# Patient Record
Sex: FEMALE | Race: BLACK OR AFRICAN AMERICAN | NOT HISPANIC OR LATINO | Employment: OTHER | URBAN - METROPOLITAN AREA
[De-identification: names, ages, dates, MRNs, and addresses within clinical notes are randomized per-mention and may not be internally consistent; named-entity substitution may affect disease eponyms.]

---

## 2017-11-28 PROBLEM — R22.31 MASS OF RIGHT AXILLA: Status: ACTIVE | Noted: 2017-11-28

## 2017-11-28 PROBLEM — L40.50 PSORIATIC ARTHRITIS: Status: ACTIVE | Noted: 2017-11-28

## 2017-11-28 PROBLEM — Z12.39 BREAST SCREENING: Status: ACTIVE | Noted: 2017-11-28

## 2017-11-28 PROBLEM — F41.9 ANXIETY: Status: ACTIVE | Noted: 2017-11-28

## 2017-11-28 PROBLEM — M25.521 ELBOW PAIN, RIGHT: Status: ACTIVE | Noted: 2017-11-28

## 2017-11-28 PROBLEM — I10 HYPERTENSION: Status: ACTIVE | Noted: 2017-11-28

## 2018-01-25 PROBLEM — H66.90 ACUTE OTITIS MEDIA: Status: ACTIVE | Noted: 2018-01-25

## 2018-01-25 PROBLEM — R11.0 NAUSEA: Status: ACTIVE | Noted: 2018-01-25

## 2018-01-25 PROBLEM — J40 BRONCHITIS: Status: ACTIVE | Noted: 2018-01-25

## 2018-01-25 PROBLEM — J11.1 INFLUENZA: Status: ACTIVE | Noted: 2018-01-25

## 2018-10-24 PROBLEM — N28.1 RENAL CYST, LEFT: Status: ACTIVE | Noted: 2018-10-24

## 2018-10-24 PROBLEM — R74.8 ABNORMAL LIVER ENZYMES: Status: ACTIVE | Noted: 2018-10-24

## 2019-03-26 ENCOUNTER — OFFICE VISIT (OUTPATIENT)
Dept: SURGERY | Facility: CLINIC | Age: 59
End: 2019-03-26
Payer: COMMERCIAL

## 2019-03-26 VITALS
HEIGHT: 63 IN | BODY MASS INDEX: 33.31 KG/M2 | WEIGHT: 188 LBS | TEMPERATURE: 98 F | SYSTOLIC BLOOD PRESSURE: 133 MMHG | DIASTOLIC BLOOD PRESSURE: 80 MMHG

## 2019-03-26 DIAGNOSIS — K64.5 THROMBOSED EXTERNAL HEMORRHOID: Primary | ICD-10-CM

## 2019-03-26 DIAGNOSIS — K81.1 CHRONIC CHOLECYSTITIS: ICD-10-CM

## 2019-03-26 PROCEDURE — 3079F PR MOST RECENT DIASTOLIC BLOOD PRESSURE 80-89 MM HG: ICD-10-PCS | Mod: ,,, | Performed by: SURGERY

## 2019-03-26 PROCEDURE — 3075F SYST BP GE 130 - 139MM HG: CPT | Mod: ,,, | Performed by: SURGERY

## 2019-03-26 PROCEDURE — 99204 OFFICE O/P NEW MOD 45 MIN: CPT | Mod: 25,,, | Performed by: SURGERY

## 2019-03-26 PROCEDURE — 99204 PR OFFICE/OUTPT VISIT, NEW, LEVL IV, 45-59 MIN: ICD-10-PCS | Mod: 25,,, | Performed by: SURGERY

## 2019-03-26 PROCEDURE — 3008F BODY MASS INDEX DOCD: CPT | Mod: ,,, | Performed by: SURGERY

## 2019-03-26 PROCEDURE — 46083 PR INCISE EXTERNAL HEMORRHOID: ICD-10-PCS | Mod: ,,, | Performed by: SURGERY

## 2019-03-26 PROCEDURE — 3008F PR BODY MASS INDEX (BMI) DOCUMENTED: ICD-10-PCS | Mod: ,,, | Performed by: SURGERY

## 2019-03-26 PROCEDURE — 3079F DIAST BP 80-89 MM HG: CPT | Mod: ,,, | Performed by: SURGERY

## 2019-03-26 PROCEDURE — 3075F PR MOST RECENT SYSTOLIC BLOOD PRESS GE 130-139MM HG: ICD-10-PCS | Mod: ,,, | Performed by: SURGERY

## 2019-03-26 PROCEDURE — 46083 INC THROMBOSED HROID XTRNL: CPT | Mod: ,,, | Performed by: SURGERY

## 2019-03-26 NOTE — PROGRESS NOTES
Subjective:       Patient ID: Paula Zamora is a 59 y.o. female.    Chief Complaint: Other (Referred by  to eval gallbladder & hemorrhoids)      HPI:  Gallbladder: Patient presents for evaluation of gallbladder problems. Problems were first noted 3 months ago. Current symptoms include none.  Pancreatic symptoms include none. Patient denies nausea, vomiting.  Symptoms are essentially resolved.    Patient reports a severe episode of epigastric pain that occurred in December after eating a salad. Her pain radiated to her back and lasted for several hours. She denied any nausea or vomiting. She saw her PCP the day after and was found to have elevated LFTs. Workup including ultrasound, CT scan and MRCP were all negative. She had follow-up with Dr. Ruiz, who repeated a CT with contrast, and a diagnosis of gallstones was made.  She denies any further symptoms. Her LFTs have now normalized. Dr. Ruiz has recommended she consider cholecystectomy.        Hemorrhoids: Patient complains of evaluation of possible hemorrhoids. Onset of symptoms was abrupt starting 5 days ago ago with gradually improving course since that time.  She describes symptoms as pain with sitting, painful perianal swelling and rectal blood .. Treatment to date has been Proctofoam. Patient denies family hx of colorectal CA.    Patient had a period of constipation followed by diarrhea. On 3/21 she went to the emergency room for a painful anal lump. It busted spontaneously and started bleeding. She was diagnosed with a thrombosed hemorrhoid and was sent home with proctoscope found to follow-up with a surgeon. Patient feels like she has improved a little but is still passing some blood and has a tender lump, although it is smaller.    Scheduled for a colonoscopy this Friday.  Last colonoscopy was   Has a hx of a cecal polyp removed by Lap Ileocecectomy in 1991.      Past Medical History:   Diagnosis Date    Back pain     Degenerative disc  disease     GERD (gastroesophageal reflux disease)     Heel spur     Hypertension     Hypertrophy of both inferior nasal turbinates     Nasal septal deviation     Psoriasis     Sciatica      Past Surgical History:   Procedure Laterality Date    HYSTERECTOMY  1999    Ileocolectomy  2000        KNEE ARTHROSCOPY W/ DEBRIDEMENT      x 2, right    LUMBAR EPIDURAL INJECTION      REDUCTION-TURBINATE N/A 8/3/2015    [facility]    SEPTOPLASTY N/A 8/3/2015    [facility]    TONSILLECTOMY      WRIST SURGERY Right     plate     Review of patient's allergies indicates:   Allergen Reactions    Statins-hmg-coa reductase inhibitors Other (See Comments)     Muscle and joint pain      Naproxen Itching    Penicillins Itching     Pt states can take Keflex    Pineapple     Sulfa (sulfonamide antibiotics) Itching     Medication List with Changes/Refills   Current Medications    ALPRAZOLAM (XANAX) 0.5 MG TABLET    Take 1 tablet (0.5 mg total) by mouth 3 (three) times daily as needed.    ASPIRIN (ECOTRIN) 81 MG EC TABLET    Take 81 mg by mouth once daily.    ESTRADIOL (ESTRACE) 1 MG TABLET    Take 1 mg by mouth once daily.    FLUTICASONE (FLONASE) 50 MCG/ACTUATION NASAL SPRAY    1 spray by Each Nare route once daily.    IBUPROFEN (ADVIL,MOTRIN) 800 MG TABLET    TAKE 1 TABLET BY MOUTH THREE TIMES DAILY( EVERY EIGHT HOURS)    LEVOCETIRIZINE (XYZAL) 5 MG TABLET    Take 5 mg by mouth every evening.    METOPROLOL TARTRATE (LOPRESSOR) 25 MG TABLET    TAKE 1 TABLET BY MOUTH EVERY 12 HOURS    PANTOPRAZOLE (PROTONIX) 40 MG TABLET    Take 40 mg by mouth once daily.    SPIRONOLACTONE (ALDACTONE) 25 MG TABLET    Take 25 mg by mouth every other day.   Discontinued Medications    ESOMEPRAZOLE MAGNESIUM (NEXIUM ORAL)    Take 1 tablet by mouth daily as needed.     METHOCARBAMOL (ROBAXIN ORAL)    Take 1 tablet by mouth daily as needed.    ONDANSETRON (ZOFRAN-ODT) 4  MG TBDL    Take 1 tablet (4 mg total) by mouth every 8 (eight) hours as needed (NAUSEA/VOMITING).     Family History   Problem Relation Age of Onset    Hypertension Father     Hypertension Mother     Diabetes Brother     Hypertension Brother     Prostate cancer Brother     Diabetes Sister     Hypertension Sister     Ovarian cancer Neg Hx     Breast cancer Neg Hx      Social History     Socioeconomic History    Marital status:      Spouse name: Not on file    Number of children: Not on file    Years of education: Not on file    Highest education level: Not on file   Occupational History    Not on file   Social Needs    Financial resource strain: Not on file    Food insecurity:     Worry: Not on file     Inability: Not on file    Transportation needs:     Medical: Not on file     Non-medical: Not on file   Tobacco Use    Smoking status: Former Smoker     Packs/day: 0.25     Years: 22.00     Pack years: 5.50     Last attempt to quit: 3/1/2017     Years since quittin.0    Smokeless tobacco: Never Used    Tobacco comment: less than 1 pack week   Substance and Sexual Activity    Alcohol use: Yes     Comment: socially    Drug use: No    Sexual activity: Yes     Partners: Male     Birth control/protection: None, Surgical   Lifestyle    Physical activity:     Days per week: Not on file     Minutes per session: Not on file    Stress: Not on file   Relationships    Social connections:     Talks on phone: Not on file     Gets together: Not on file     Attends Taoism service: Not on file     Active member of club or organization: Not on file     Attends meetings of clubs or organizations: Not on file     Relationship status: Not on file   Other Topics Concern    Not on file   Social History Narrative    Not on file         Review of Systems   Constitutional: Negative for appetite change, chills, fever and unexpected weight change.   HENT: Negative for hearing loss, rhinorrhea, sore  throat and voice change.    Eyes: Negative for photophobia and visual disturbance.   Respiratory: Negative for cough, choking and shortness of breath.    Cardiovascular: Negative for chest pain, palpitations and leg swelling.   Gastrointestinal: Positive for anal bleeding and constipation. Negative for abdominal pain, blood in stool, diarrhea, nausea and vomiting.   Endocrine: Negative for cold intolerance, heat intolerance and polyphagia.   Genitourinary: Negative for dysuria.   Musculoskeletal: Negative for arthralgias and back pain.   Skin: Negative for color change.   Neurological: Negative for dizziness, seizures, syncope and headaches.   Hematological: Negative for adenopathy. Does not bruise/bleed easily.       Objective:      Physical Exam   Constitutional: She appears well-developed and well-nourished.  Non-toxic appearance. No distress.   HENT:   Head: Normocephalic and atraumatic. Head is without abrasion and without laceration.   Right Ear: External ear normal.   Left Ear: External ear normal.   Nose: Nose normal.   Mouth/Throat: Oropharynx is clear and moist.   Eyes: Pupils are equal, round, and reactive to light. EOM are normal.   Neck: Trachea normal. No tracheal deviation and normal range of motion present. No thyroid mass and no thyromegaly present.   Cardiovascular: Normal rate and regular rhythm.   Pulmonary/Chest: Effort normal. No accessory muscle usage. No tachypnea. No respiratory distress.   Abdominal: Soft. Normal appearance and bowel sounds are normal. She exhibits no distension and no mass. There is no hepatosplenomegaly. There is no tenderness. There is no tenderness at McBurney's point and negative Villagomez's sign. No hernia.   Genitourinary: Rectal exam shows external hemorrhoid (thrombosed with overlying excoriation) and tenderness.   Lymphadenopathy:     She has no cervical adenopathy.     She has no axillary adenopathy.        Right: No inguinal adenopathy present.        Left: No  inguinal adenopathy present.   Neurological: She is alert. Coordination and gait normal.   Skin: Skin is warm and intact.   Psychiatric: She has a normal mood and affect. Her speech is normal and behavior is normal.       Assessment/Plan:   Thrombosed external hemorrhoid  -     Incision & Drainage    Chronic cholecystitis      Imaging reviewed      Planned procedure: I&D Thrombosed Ext Hem    I discussed the proposed procedures the the patient including risks, benefits, indications, alternatives and special concerns.  The patient appears to understand and agrees to go ahead with surgery.  I have made no promises, warranties or verbal agreements beyond what was discussed above.      Will plan to do cholecystectomy at a later date after patient is healed.  She is also scheduled for a colonoscopy by Dr. Ruiz.      Follow up in about 1 month (around 4/26/2019) for F/U - Make appt after diagnostic tests, F/U - Make appt after appt w/specialist.

## 2019-03-26 NOTE — LETTER
April 2, 2019      Zari Ruiz MD  [address]  [place] [address]           [facility] - General Surgery  [address]  [place] [address]  Phone: [phone]  Fax: [phone]          Patient: Paula Zamora   MR Number: [id number]   YOB: 1960   Date of Visit: 3/26/2019       Dear Dr. Zari Ruiz:    Thank you for referring Paula Zamora to me for evaluation. Attached you will find relevant portions of my assessment and plan of care.    If you have questions, please do not hesitate to call me. I look forward to following Paula Zamora along with you.    Sincerely,    Samanta Hawkins MD    Enclosure  CC:  No Recipients    If you would like to receive this communication electronically, please contact [email or web address] or [phone] to request more information on [employer] access.    For providers and/or their staff who would like to refer a patient to [facility], please contact us through our one-stop-shop provider referral line, [facility], at [phone].    If you feel you have received this communication in error or would no longer like to receive these types of communications, please e-mail [email or web address]

## 2019-05-29 ENCOUNTER — OFFICE VISIT (OUTPATIENT)
Dept: ORTHOPEDICS | Facility: CLINIC | Age: 59
End: 2019-05-29
Payer: COMMERCIAL

## 2019-05-29 VITALS
HEIGHT: 63 IN | HEART RATE: 76 BPM | SYSTOLIC BLOOD PRESSURE: 130 MMHG | WEIGHT: 180 LBS | DIASTOLIC BLOOD PRESSURE: 82 MMHG | BODY MASS INDEX: 31.89 KG/M2

## 2019-05-29 DIAGNOSIS — M23.311 MEDIAL MENISCUS, ANTERIOR HORN DERANGEMENT, RIGHT: ICD-10-CM

## 2019-05-29 DIAGNOSIS — M51.36 DISC DEGENERATION, LUMBAR: ICD-10-CM

## 2019-05-29 DIAGNOSIS — M17.11 OSTEOARTHRITIS OF RIGHT KNEE: Primary | ICD-10-CM

## 2019-05-29 PROCEDURE — 3079F DIAST BP 80-89 MM HG: CPT | Mod: ,,, | Performed by: ORTHOPAEDIC SURGERY

## 2019-05-29 PROCEDURE — 3079F PR MOST RECENT DIASTOLIC BLOOD PRESSURE 80-89 MM HG: ICD-10-PCS | Mod: ,,, | Performed by: ORTHOPAEDIC SURGERY

## 2019-05-29 PROCEDURE — 73560 X-RAY EXAM OF KNEE 1 OR 2: CPT | Mod: RT,,, | Performed by: ORTHOPAEDIC SURGERY

## 2019-05-29 PROCEDURE — 72170 X-RAY EXAM OF PELVIS: CPT | Mod: ,,, | Performed by: ORTHOPAEDIC SURGERY

## 2019-05-29 PROCEDURE — 3008F BODY MASS INDEX DOCD: CPT | Mod: ,,, | Performed by: ORTHOPAEDIC SURGERY

## 2019-05-29 PROCEDURE — 99203 PR OFFICE/OUTPT VISIT, NEW, LEVL III, 30-44 MIN: ICD-10-PCS | Mod: 25,,, | Performed by: ORTHOPAEDIC SURGERY

## 2019-05-29 PROCEDURE — 3008F PR BODY MASS INDEX (BMI) DOCUMENTED: ICD-10-PCS | Mod: ,,, | Performed by: ORTHOPAEDIC SURGERY

## 2019-05-29 PROCEDURE — 3075F SYST BP GE 130 - 139MM HG: CPT | Mod: ,,, | Performed by: ORTHOPAEDIC SURGERY

## 2019-05-29 PROCEDURE — 72100 PR  X-RAY LUMBAR SPINE 2/3 VW: ICD-10-PCS | Mod: ,,, | Performed by: ORTHOPAEDIC SURGERY

## 2019-05-29 PROCEDURE — 73560 PR  X-RAY KNEE 1 OR 2 VIEW: ICD-10-PCS | Mod: RT,,, | Performed by: ORTHOPAEDIC SURGERY

## 2019-05-29 PROCEDURE — 72100 X-RAY EXAM L-S SPINE 2/3 VWS: CPT | Mod: ,,, | Performed by: ORTHOPAEDIC SURGERY

## 2019-05-29 PROCEDURE — 99203 OFFICE O/P NEW LOW 30 MIN: CPT | Mod: 25,,, | Performed by: ORTHOPAEDIC SURGERY

## 2019-05-29 PROCEDURE — 72170 PR  X-RAY PELVIS 1/2 VW: ICD-10-PCS | Mod: ,,, | Performed by: ORTHOPAEDIC SURGERY

## 2019-05-29 PROCEDURE — 3075F PR MOST RECENT SYSTOLIC BLOOD PRESS GE 130-139MM HG: ICD-10-PCS | Mod: ,,, | Performed by: ORTHOPAEDIC SURGERY

## 2019-05-29 RX ORDER — HYDROCODONE BITARTRATE AND ACETAMINOPHEN 5; 325 MG/1; MG/1
1 TABLET ORAL EVERY 6 HOURS PRN
Qty: 28 TABLET | Refills: 0 | Status: SHIPPED | OUTPATIENT
Start: 2019-05-29 | End: 2019-06-05

## 2019-05-29 NOTE — PROGRESS NOTES
Subjective:       Patient ID: Paula Zamora is a 59 y.o. female.    Chief Complaint: Pain of the Right Knee (RT knee pain for about a week, states the pain feels like a pulling in the back of her knee. States when she gets up her knee feels stiff. Pain is intermittent, but increases with her level of activity. Scope done 20 years ago)      History of Present Illness  Long history of chronic low back pain unchanged since last visit several years ago. 2 onset right knee pain swelling popping and locking without trauma.    Current Medications  Current Outpatient Medications   Medication Sig Dispense Refill    ALPRAZolam (XANAX) 0.5 MG tablet Take 1 tablet (0.5 mg total) by mouth 3 (three) times daily as needed. 30 tablet 0    aspirin (ECOTRIN) 81 MG EC tablet Take 81 mg by mouth once daily.      estradiol (ESTRACE) 1 MG tablet Take 1 mg by mouth once daily.      fluticasone (FLONASE) 50 mcg/actuation nasal spray 1 spray by Each Nare route once daily.      ibuprofen (ADVIL,MOTRIN) 800 MG tablet TAKE 1 TABLET BY MOUTH THREE TIMES DAILY( EVERY EIGHT HOURS) 60 tablet 0    levocetirizine (XYZAL) 5 MG tablet Take 5 mg by mouth every evening.      metoprolol tartrate (LOPRESSOR) 25 MG tablet TAKE 1 TABLET BY MOUTH EVERY 12 HOURS 180 tablet 1    pantoprazole (PROTONIX) 40 MG tablet Take 40 mg by mouth once daily.      spironolactone (ALDACTONE) 25 MG tablet Take 25 mg by mouth every other day.       No current facility-administered medications for this visit.        Allergies  Review of patient's allergies indicates:   Allergen Reactions    Statins-hmg-coa reductase inhibitors Other (See Comments)     Muscle and joint pain      Naproxen Itching    Penicillins Itching     Pt states can take Keflex    Pineapple     Sulfa (sulfonamide antibiotics) Itching       Past Medical History  Past Medical History:   Diagnosis Date    Back pain     Degenerative disc disease     GERD (gastroesophageal reflux disease)      Heel spur     Hypertension     Hypertrophy of both inferior nasal turbinates     Nasal septal deviation     Psoriasis     Sciatica        Surgical History  Past Surgical History:   Procedure Laterality Date    HYSTERECTOMY      Ileocolectomy          KNEE ARTHROSCOPY W/ DEBRIDEMENT      x 2, right    LUMBAR EPIDURAL INJECTION      REDUCTION-TURBINATE N/A 8/3/2015    [facility]    SEPTOPLASTY N/A 8/3/2015    [facility]    TONSILLECTOMY      WRIST SURGERY Right     plate       Family History:   Family History   Problem Relation Age of Onset    Hypertension Father     Hypertension Mother     Diabetes Brother     Hypertension Brother     Prostate cancer Brother     Diabetes Sister     Hypertension Sister     Ovarian cancer Neg Hx     Breast cancer Neg Hx        Social History:   Social History     Socioeconomic History    Marital status:      Spouse name: Not on file    Number of children: Not on file    Years of education: Not on file    Highest education level: Not on file   Occupational History    Not on file   Social Needs    Financial resource strain: Not on file    Food insecurity:     Worry: Not on file     Inability: Not on file    Transportation needs:     Medical: Not on file     Non-medical: Not on file   Tobacco Use    Smoking status: Former Smoker     Packs/day: 0.25     Years: 22.00     Pack years: 5.50     Last attempt to quit: 3/1/2017     Years since quittin.2    Smokeless tobacco: Never Used    Tobacco comment: less than 1 pack week   Substance and Sexual Activity    Alcohol use: Yes     Comment: socially    Drug use: No    Sexual activity: Yes     Partners: Male     Birth control/protection: None, Surgical   Lifestyle    Physical activity:     Days per week: Not on file     Minutes per session: Not on file    Stress: Not on file   Relationships    Social connections:      Talks on phone: Not on file     Gets together: Not on file     Attends Uatsdin service: Not on file     Active member of club or organization: Not on file     Attends meetings of clubs or organizations: Not on file     Relationship status: Not on file   Other Topics Concern    Not on file   Social History Narrative    Not on file       Hospitalization/Major Diagnostic Procedure:     Review of Systems     General/Constitutional:  Chills denies. Fatigue denies. Fever denies. Weight gain denies. Weight loss denies.    Respiratory:  Shortness of breath denies.    Cardiovascular:  Chest pain denies.    Gastrointestinal:  Constipation denies. Diarrhea denies. Nausea denies. Vomiting denies.     Hematology:  Easy bruising denies. Prolonged bleeding denies.     Genitourinary:  Frequent urination denies. Pain in lower back denies. Painful urination denies.     Musculoskeletal:  See HPI for details    Skin:  Rash denies.    Neurologic:  Dizziness denies. Gait abnormalities denies. Seizures denies. Tingling/Numbess denies.    Psychiatric:  Anxiety denies. Depressed mood denies.     Objective:   Vital Signs:   Vitals:    05/29/19 1000   BP: 130/82   Pulse: 76        Physical Exam      General Examination:     Constitutional: The patient is alert and oriented to lace person and time. Mood is pleasant.     Head/Face: Normal facial features normal eyebrows    Eyes: Normal extraocular motion bilaterally    Lungs: Respirations are equal and unlabored    Gait is coordinated.    Cardiovascular: There are no swelling or varicosities present.    Lymphatic: Negative for adenopathy    Skin: Normal    Neurological: Level of consciousness normal. Oriented to place person and time and situation    Psychiatric: Oriented to time place person and situation    Right knee exam shows swelling of the knee tenderness in the mid joint line a positive primary test and limitation of motion. No instability. Straight leg raising negative. Back  exam shows moderate tenderness both posterior leg spine areas and paraspinous muscles no spasm noted moderate restriction of motion straight leg raising negative    XRAY Report/ Interpretation : AP and lateral x-rays of lumbar spine will performed today. Indications low back pain. Findings: Mild degenerative changes with mild disc space narrowing L4-5 and long-standing early degenerative disc disease  AP and lateral x-rays both knees were taken today they appear to be normal  AP pelvis x-ray was taken today. Indications low back pain and/or hip pain. Findings AP pelvis x-ray appears to be normal with no evidence of fractures or significant degenerative disease      Assessment:       1. Osteoarthritis of right knee    2. Disc degeneration, lumbar    3. Medial meniscus, anterior horn derangement, right        Plan:       Paula was seen today for pain.    Diagnoses and all orders for this visit:    Osteoarthritis of right knee  -     X-Ray Knee 1 or 2 View Left    Disc degeneration, lumbar  -     X-Ray Lumbar Spine Ap And Lateral  -     X-Ray Pelvis Routine AP    Medial meniscus, anterior horn derangement, right         No follow-ups on file.    MRI right knee advise clinically she has evidence of a medial meniscus tear observed low back complaints but may need referral to pain management    This note was created using Dragon voice recognition software that occasionally misinterpreted phrases or words.

## 2019-06-06 ENCOUNTER — OFFICE VISIT (OUTPATIENT)
Dept: PODIATRY | Facility: CLINIC | Age: 59
End: 2019-06-06
Payer: COMMERCIAL

## 2019-06-06 VITALS
HEART RATE: 60 BPM | WEIGHT: 180 LBS | HEIGHT: 63 IN | BODY MASS INDEX: 31.89 KG/M2 | DIASTOLIC BLOOD PRESSURE: 82 MMHG | SYSTOLIC BLOOD PRESSURE: 134 MMHG

## 2019-06-06 DIAGNOSIS — M79.672 PAIN OF LEFT HEEL: ICD-10-CM

## 2019-06-06 DIAGNOSIS — M72.2 PLANTAR FASCIITIS: Primary | ICD-10-CM

## 2019-06-06 DIAGNOSIS — M79.672 LEFT FOOT PAIN: ICD-10-CM

## 2019-06-06 PROCEDURE — 3079F DIAST BP 80-89 MM HG: CPT | Mod: ,,, | Performed by: PODIATRIST

## 2019-06-06 PROCEDURE — 99213 OFFICE O/P EST LOW 20 MIN: CPT | Mod: 25,,, | Performed by: PODIATRIST

## 2019-06-06 PROCEDURE — 3075F PR MOST RECENT SYSTOLIC BLOOD PRESS GE 130-139MM HG: ICD-10-PCS | Mod: ,,, | Performed by: PODIATRIST

## 2019-06-06 PROCEDURE — 20550 NJX 1 TENDON SHEATH/LIGAMENT: CPT | Mod: LT,,, | Performed by: PODIATRIST

## 2019-06-06 PROCEDURE — 3075F SYST BP GE 130 - 139MM HG: CPT | Mod: ,,, | Performed by: PODIATRIST

## 2019-06-06 PROCEDURE — 3008F PR BODY MASS INDEX (BMI) DOCUMENTED: ICD-10-PCS | Mod: ,,, | Performed by: PODIATRIST

## 2019-06-06 PROCEDURE — 3008F BODY MASS INDEX DOCD: CPT | Mod: ,,, | Performed by: PODIATRIST

## 2019-06-06 PROCEDURE — 99213 PR OFFICE/OUTPT VISIT, EST, LEVL III, 20-29 MIN: ICD-10-PCS | Mod: 25,,, | Performed by: PODIATRIST

## 2019-06-06 PROCEDURE — 3079F PR MOST RECENT DIASTOLIC BLOOD PRESSURE 80-89 MM HG: ICD-10-PCS | Mod: ,,, | Performed by: PODIATRIST

## 2019-06-06 PROCEDURE — 20550 PR INJECT TENDON SHEATH/LIGAMENT: ICD-10-PCS | Mod: LT,,, | Performed by: PODIATRIST

## 2019-06-06 NOTE — PROGRESS NOTES
[address]  [place] [address]  Phone: [phone]   [phone]    Patient's PCP:Tanvir Cisneros III, MD  Referring Provider: No ref. provider found    Subjective:      Chief Complaint:: Heel Pain (left )    EB Zamora is a 59 y.o. female who presents with a complaint of left heel pain lasting for about 2 weeks. Onset of the symptoms was no trauma, no new daily activity change.  Current symptoms include heel feels swollen and when coming off heel and go up, feels like something is pulling in there.  Aggravating factors are prolonged walking. Treatment to date have included ibuprofen, spraying biofreeze. Patients rates pain 10/10 on pain scale.      Vitals:    06/06/19 1012   BP: 134/82   Pulse: 60     Shoe Size: 8.5    Past Surgical History:   Procedure Laterality Date    HYSTERECTOMY  1999    Ileocolectomy  2000        KNEE ARTHROSCOPY W/ DEBRIDEMENT      x 2, right    LUMBAR EPIDURAL INJECTION      REDUCTION-TURBINATE N/A 8/3/2015    [facility]    SEPTOPLASTY N/A 8/3/2015    [facility]    TONSILLECTOMY      WRIST SURGERY Right     plate     Past Medical History:   Diagnosis Date    Back pain     Degenerative disc disease     GERD (gastroesophageal reflux disease)     Heel spur     Hypertension     Hypertrophy of both inferior nasal turbinates     Nasal septal deviation     Psoriasis     Sciatica      Family History   Problem Relation Age of Onset    Hypertension Father     Hypertension Mother     Diabetes Brother     Hypertension Brother     Prostate cancer Brother     Diabetes Sister     Hypertension Sister     Ovarian cancer Neg Hx     Breast cancer Neg Hx         Social History:   Marital Status:   Alcohol History:  reports that she drinks alcohol.  Tobacco History:  reports that she quit smoking about 2 years ago. She has a 5.50 pack-year smoking history. She has  never used smokeless tobacco.  Drug History:  reports that she does not use drugs.    Review of patient's allergies indicates:   Allergen Reactions    Statins-hmg-coa reductase inhibitors Other (See Comments)     Muscle and joint pain      Naproxen Itching    Penicillins Itching     Pt states can take Keflex    Pineapple     Sulfa (sulfonamide antibiotics) Itching       Current Outpatient Medications   Medication Sig Dispense Refill    ALPRAZolam (XANAX) 0.5 MG tablet Take 1 tablet (0.5 mg total) by mouth 3 (three) times daily as needed. 30 tablet 0    aspirin (ECOTRIN) 81 MG EC tablet Take 81 mg by mouth once daily.      estradiol (ESTRACE) 1 MG tablet Take 1 mg by mouth once daily.      fluticasone (FLONASE) 50 mcg/actuation nasal spray 1 spray by Each Nare route once daily.      ibuprofen (ADVIL,MOTRIN) 800 MG tablet TAKE 1 TABLET BY MOUTH THREE TIMES DAILY( EVERY EIGHT HOURS) 60 tablet 0    levocetirizine (XYZAL) 5 MG tablet Take 5 mg by mouth every evening.      metoprolol tartrate (LOPRESSOR) 25 MG tablet TAKE 1 TABLET BY MOUTH EVERY 12 HOURS 180 tablet 1    pantoprazole (PROTONIX) 40 MG tablet Take 40 mg by mouth once daily.      spironolactone (ALDACTONE) 25 MG tablet Take 25 mg by mouth every other day.       No current facility-administered medications for this visit.        Review of Systems      Objective:        Physical Exam:   Foot Exam    General  General Appearance: appears stated age and healthy   Orientation: alert and oriented to person, place, and time   Affect: appropriate   Gait: unimpaired       Right Foot/Ankle     Inspection and Palpation  Ecchymosis: none  Tenderness: calcaneus tenderness and plantar fascia   Swelling: none     Neurovascular  Dorsalis pedis: 2+  Posterior tibial: 2+  Saphenous nerve sensation: normal  Tibial nerve sensation: normal  Superficial peroneal nerve sensation: normal  Deep peroneal nerve sensation: normal  Sural nerve sensation:  normal    Comments  Pain on palpation of the infeior medial heel. No pain present  with side to side compression of the calcaneus. Negative tinnel's sign  at the tarsal tunnel. Negative Ward's nerve pain. Negative Calcaneal nerve pain. No soft tissue masses. Pain present with dorsiflexion of the ankle. No edema, erythema, or ecchymosis noted.         Physical Exam   Cardiovascular:   Pulses:       Dorsalis pedis pulses are 2+ on the right side.        Posterior tibial pulses are 2+ on the right side.       Imaging:   X-Ray Foot Complete Left  No acute fractures, no bone tumors, no soft tissue masses. Small inferior posterior calcaneal exostosis.           Assessment:       1. Plantar fasciitis - Left Foot    2. Left foot pain    3. Pain of left heel      Plan:   Plantar fasciitis - Left Foot  -     CROSS  FOR HOME USE    Left foot pain  -     X-Ray Foot Complete Left    Pain of left heel  -     X-Ray Calcaneus 2 View Left    Plantar Fasciitis Level II Treatment:   Continue the conservative treatment of Level I, which is:   Anti-inflammatory  No bare feet  Over the counter insert  Restrict activity level   Use running shoes at full time  No impact exercise and stretch gastrocnemius muscle    Steroid Injection: 1-1/2 cc of Marcaine, 1 cc of Decadron phosphate, 1/2 cc of methylprednisolone to inferior medial left calcaneus by plantar fascial insertion. Patient tolerated procedures well.    No follow-ups on file.    Procedures - None    Counseling:     I provided patient education verbally regarding:   Patient diagnosis, treatment options, as well as alternatives, risks, and benefits.     This note was created using Dragon voice recognition software that occasionally misinterpreted phrases or words.

## 2019-06-12 ENCOUNTER — OFFICE VISIT (OUTPATIENT)
Dept: ORTHOPEDICS | Facility: CLINIC | Age: 59
End: 2019-06-12
Payer: COMMERCIAL

## 2019-06-12 VITALS
SYSTOLIC BLOOD PRESSURE: 128 MMHG | HEIGHT: 63 IN | HEART RATE: 60 BPM | WEIGHT: 180 LBS | BODY MASS INDEX: 31.89 KG/M2 | DIASTOLIC BLOOD PRESSURE: 68 MMHG

## 2019-06-12 DIAGNOSIS — M51.36 DISC DEGENERATION, LUMBAR: ICD-10-CM

## 2019-06-12 DIAGNOSIS — M17.11 PRIMARY OSTEOARTHRITIS OF RIGHT KNEE: Primary | ICD-10-CM

## 2019-06-12 DIAGNOSIS — M47.816 LUMBAR FACET ARTHROPATHY: ICD-10-CM

## 2019-06-12 PROCEDURE — 3078F PR MOST RECENT DIASTOLIC BLOOD PRESSURE < 80 MM HG: ICD-10-PCS | Mod: ,,, | Performed by: ORTHOPAEDIC SURGERY

## 2019-06-12 PROCEDURE — 3074F SYST BP LT 130 MM HG: CPT | Mod: ,,, | Performed by: ORTHOPAEDIC SURGERY

## 2019-06-12 PROCEDURE — 99214 OFFICE O/P EST MOD 30 MIN: CPT | Mod: ,,, | Performed by: ORTHOPAEDIC SURGERY

## 2019-06-12 PROCEDURE — 99214 PR OFFICE/OUTPT VISIT, EST, LEVL IV, 30-39 MIN: ICD-10-PCS | Mod: ,,, | Performed by: ORTHOPAEDIC SURGERY

## 2019-06-12 PROCEDURE — 3078F DIAST BP <80 MM HG: CPT | Mod: ,,, | Performed by: ORTHOPAEDIC SURGERY

## 2019-06-12 PROCEDURE — 3008F BODY MASS INDEX DOCD: CPT | Mod: ,,, | Performed by: ORTHOPAEDIC SURGERY

## 2019-06-12 PROCEDURE — 3008F PR BODY MASS INDEX (BMI) DOCUMENTED: ICD-10-PCS | Mod: ,,, | Performed by: ORTHOPAEDIC SURGERY

## 2019-06-12 PROCEDURE — 3074F PR MOST RECENT SYSTOLIC BLOOD PRESSURE < 130 MM HG: ICD-10-PCS | Mod: ,,, | Performed by: ORTHOPAEDIC SURGERY

## 2019-06-12 NOTE — PROGRESS NOTES
Subjective:       Patient ID: Paula Zamoar is a 59 y.o. female.    Chief Complaint: Pain of the Right Knee (Right knee pain/MRI f/u (6.4.19) States that her knee pain is off and on. States that it depends on what she is doing on  How bad it is.. )      History of Present Illness  Patient is here to follow-up for persistent right knee pain. She has an updated MRI of the right knee to review today.    Current Medications  Current Outpatient Medications   Medication Sig Dispense Refill    ALPRAZolam (XANAX) 0.5 MG tablet Take 1 tablet (0.5 mg total) by mouth 3 (three) times daily as needed. 30 tablet 0    aspirin (ECOTRIN) 81 MG EC tablet Take 81 mg by mouth once daily.      estradiol (ESTRACE) 1 MG tablet Take 1 mg by mouth once daily.      fluticasone (FLONASE) 50 mcg/actuation nasal spray 1 spray by Each Nare route once daily.      ibuprofen (ADVIL,MOTRIN) 800 MG tablet TAKE 1 TABLET BY MOUTH THREE TIMES DAILY( EVERY EIGHT HOURS) 60 tablet 0    levocetirizine (XYZAL) 5 MG tablet Take 5 mg by mouth every evening.      metoprolol tartrate (LOPRESSOR) 25 MG tablet TAKE 1 TABLET BY MOUTH EVERY 12 HOURS 180 tablet 1    pantoprazole (PROTONIX) 40 MG tablet Take 40 mg by mouth once daily.      spironolactone (ALDACTONE) 25 MG tablet Take 25 mg by mouth every other day.       No current facility-administered medications for this visit.        Allergies  Review of patient's allergies indicates:   Allergen Reactions    Statins-hmg-coa reductase inhibitors Other (See Comments)     Muscle and joint pain      Naproxen Itching    Penicillins Itching     Pt states can take Keflex    Pineapple     Sulfa (sulfonamide antibiotics) Itching       Past Medical History  Past Medical History:   Diagnosis Date    Back pain     Degenerative disc disease     GERD (gastroesophageal reflux disease)     Heel spur     Hypertension     Hypertrophy of both inferior nasal turbinates     Nasal septal deviation      Psoriasis     Sciatica        Surgical History  Past Surgical History:   Procedure Laterality Date    HYSTERECTOMY      Ileocolectomy          KNEE ARTHROSCOPY W/ DEBRIDEMENT      x 2, right    LUMBAR EPIDURAL INJECTION      REDUCTION-TURBINATE N/A 8/3/2015    [facility]    SEPTOPLASTY N/A 8/3/2015    [facility]    TONSILLECTOMY      WRIST SURGERY Right     plate       Family History:   Family History   Problem Relation Age of Onset    Hypertension Father     Hypertension Mother     Diabetes Brother     Hypertension Brother     Prostate cancer Brother     Diabetes Sister     Hypertension Sister     Ovarian cancer Neg Hx     Breast cancer Neg Hx        Social History:   Social History     Socioeconomic History    Marital status:      Spouse name: Not on file    Number of children: Not on file    Years of education: Not on file    Highest education level: Not on file   Occupational History    Not on file   Social Needs    Financial resource strain: Not on file    Food insecurity:     Worry: Not on file     Inability: Not on file    Transportation needs:     Medical: Not on file     Non-medical: Not on file   Tobacco Use    Smoking status: Former Smoker     Packs/day: 0.25     Years: 22.00     Pack years: 5.50     Last attempt to quit: 3/1/2017     Years since quittin.2    Smokeless tobacco: Never Used    Tobacco comment: less than 1 pack week   Substance and Sexual Activity    Alcohol use: Yes     Comment: socially    Drug use: No    Sexual activity: Yes     Partners: Male     Birth control/protection: None, Surgical   Lifestyle    Physical activity:     Days per week: Not on file     Minutes per session: Not on file    Stress: Not on file   Relationships    Social connections:     Talks on phone: Not on file     Gets together: Not on file     Attends Je[employer]ah's witness service: Not on file     Active  member of club or organization: Not on file     Attends meetings of clubs or organizations: Not on file     Relationship status: Not on file   Other Topics Concern    Not on file   Social History Narrative    Not on file       Hospitalization/Major Diagnostic Procedure:     Review of Systems     General/Constitutional:  Chills denies. Fatigue denies. Fever denies. Weight gain denies. Weight loss denies.    Respiratory:  Shortness of breath denies.    Cardiovascular:  Chest pain denies.    Gastrointestinal:  Constipation denies. Diarrhea denies. Nausea denies. Vomiting denies.     Hematology:  Easy bruising denies. Prolonged bleeding denies.     Genitourinary:  Frequent urination denies. Pain in lower back denies. Painful urination denies.     Musculoskeletal:  See HPI for details    Skin:  Rash denies.    Neurologic:  Dizziness denies. Gait abnormalities denies. Seizures denies. Tingling/Numbess denies.    Psychiatric:  Anxiety denies. Depressed mood denies.     Objective:   Vital Signs:   Vitals:    06/12/19 1258   BP: 128/68   Pulse: 60        Physical Exam      General Examination:     Constitutional: The patient is alert and oriented to lace person and time. Mood is pleasant.     Head/Face: Normal facial features normal eyebrows    Eyes: Normal extraocular motion bilaterally    Lungs: Respirations are equal and unlabored    Gait is coordinated.    Cardiovascular: There are no swelling or varicosities present.    Lymphatic: Negative for adenopathy    Skin: Normal    Neurological: Level of consciousness normal. Oriented to place person and time and situation    Psychiatric: Oriented to time place person and situation    Right knee exam shows swelling of the knee tenderness in the mid joint line a positive primary test and limitation of motion. No instability. Straight leg raising negative. Back exam shows moderate tenderness both posterior leg spine areas and paraspinous muscles no spasm noted moderate  "restriction of motion straight leg raising negative.    XRAY Report/ Interpretation: Right knee MRI reviewed the patient the office today demonstrates significant medial compartment degenerative joint disease. Significant damage to the medial meniscus and moderate damage to the lateral meniscus.      Assessment:       1. Primary osteoarthritis of right knee    2. Lumbar facet arthropathy    3. Disc degeneration, lumbar        Plan:       Paula was seen today for pain.    Diagnoses and all orders for this visit:    Primary osteoarthritis of right knee    Lumbar facet arthropathy    Disc degeneration, lumbar         No follow-ups on file.  Homar Cespedes, physician's assistant served in the capacity as a "scribe" for this patient encounter  A "face to face" encounter occurred with Dr. Abdul on this date  The treatment plan and medical decision making is outlined below:  The patient just recently had a corticosteroid injection for plantar fasciitis and therefore we recommend avoiding another corticosteroid injection in such short time. Instead we will order Synvisc one. Follow-up for injection once approved by insurance. Future treatment options should not include arthroscopy secondary to the significant degenerative joint.  disease that is noted. Trial of Pennsaid    This note was created using Dragon voice recognition software that occasionally misinterpreted phrases or words.  "

## 2019-07-05 ENCOUNTER — OFFICE VISIT (OUTPATIENT)
Dept: ORTHOPEDICS | Facility: CLINIC | Age: 59
End: 2019-07-05
Payer: COMMERCIAL

## 2019-07-05 VITALS
WEIGHT: 190 LBS | DIASTOLIC BLOOD PRESSURE: 70 MMHG | HEART RATE: 74 BPM | HEIGHT: 63 IN | BODY MASS INDEX: 33.66 KG/M2 | SYSTOLIC BLOOD PRESSURE: 158 MMHG

## 2019-07-05 DIAGNOSIS — M17.11 PRIMARY OSTEOARTHRITIS OF RIGHT KNEE: Primary | ICD-10-CM

## 2019-07-05 PROCEDURE — 3008F PR BODY MASS INDEX (BMI) DOCUMENTED: ICD-10-PCS | Mod: ICN,,, | Performed by: PHYSICIAN ASSISTANT

## 2019-07-05 PROCEDURE — 3078F PR MOST RECENT DIASTOLIC BLOOD PRESSURE < 80 MM HG: ICD-10-PCS | Mod: ICN,,, | Performed by: PHYSICIAN ASSISTANT

## 2019-07-05 PROCEDURE — 20610 LARGE JOINT ASPIRATION/INJECTION: R KNEE: ICD-10-PCS | Mod: ICN,,, | Performed by: PHYSICIAN ASSISTANT

## 2019-07-05 PROCEDURE — 99499 NO LOS: ICD-10-PCS | Mod: 25,,, | Performed by: PHYSICIAN ASSISTANT

## 2019-07-05 PROCEDURE — 3077F SYST BP >= 140 MM HG: CPT | Mod: ICN,,, | Performed by: PHYSICIAN ASSISTANT

## 2019-07-05 PROCEDURE — 3078F DIAST BP <80 MM HG: CPT | Mod: ICN,,, | Performed by: PHYSICIAN ASSISTANT

## 2019-07-05 PROCEDURE — 3077F PR MOST RECENT SYSTOLIC BLOOD PRESSURE >= 140 MM HG: ICD-10-PCS | Mod: ICN,,, | Performed by: PHYSICIAN ASSISTANT

## 2019-07-05 PROCEDURE — 20610 DRAIN/INJ JOINT/BURSA W/O US: CPT | Mod: ICN,,, | Performed by: PHYSICIAN ASSISTANT

## 2019-07-05 PROCEDURE — 3008F BODY MASS INDEX DOCD: CPT | Mod: ICN,,, | Performed by: PHYSICIAN ASSISTANT

## 2019-07-05 PROCEDURE — 99499 UNLISTED E&M SERVICE: CPT | Mod: 25,,, | Performed by: PHYSICIAN ASSISTANT

## 2019-07-05 NOTE — PROCEDURES
Large Joint Aspiration/Injection: R knee  Date/Time: 7/5/2019 12:16 PM  Performed by: TATE Bowie  Authorized by: TATE Bowie     Consent Done?:  Yes (Verbal)  Indications:  Pain  Procedure site marked: Yes    Timeout: Prior to procedure the correct patient, procedure, and site was verified      Location:  Knee  Site:  R knee  Prep: Patient was prepped and draped in usual sterile fashion    Ultrasonic Guidance for needle placement: No  Needle size:  20 G  Medications:  48 mg hylan g-f 20 48 mg/6 mL  Patient tolerance:  Patient tolerated the procedure well with no immediate complications

## 2019-07-05 NOTE — PROGRESS NOTES
Subjective:       Patient ID: Paula Zamora is a 59 y.o. female.    Chief Complaint: Pain of the Right Knee (Right knee synvisc injevction)      History of Present Illness  F/U for R knee pain secondary to DJD.   - R knee SYNVISC One    Current Medications  Current Outpatient Medications   Medication Sig Dispense Refill    ALPRAZolam (XANAX) 0.5 MG tablet Take 1 tablet (0.5 mg total) by mouth 3 (three) times daily as needed. 30 tablet 0    aspirin (ECOTRIN) 81 MG EC tablet Take 81 mg by mouth once daily.      diclofenac sodium (PENNSAID) 2 % SoPk Apply 40 mg topically 2 (two) times daily. 120 g 3    estradiol (ESTRACE) 1 MG tablet Take 1 mg by mouth once daily.      fluticasone (FLONASE) 50 mcg/actuation nasal spray 1 spray by Each Nare route once daily.      ibuprofen (ADVIL,MOTRIN) 800 MG tablet TAKE 1 TABLET BY MOUTH THREE TIMES DAILY( EVERY EIGHT HOURS) 60 tablet 0    levocetirizine (XYZAL) 5 MG tablet Take 5 mg by mouth every evening.      metoprolol tartrate (LOPRESSOR) 25 MG tablet TAKE 1 TABLET BY MOUTH EVERY 12 HOURS 180 tablet 1    pantoprazole (PROTONIX) 40 MG tablet Take 40 mg by mouth once daily.      spironolactone (ALDACTONE) 25 MG tablet Take 25 mg by mouth every other day.       No current facility-administered medications for this visit.        Allergies  Review of patient's allergies indicates:   Allergen Reactions    Statins-hmg-coa reductase inhibitors Other (See Comments)     Muscle and joint pain      Naproxen Itching    Penicillins Itching     Pt states can take Keflex    Pineapple     Sulfa (sulfonamide antibiotics) Itching       Past Medical History  Past Medical History:   Diagnosis Date    Back pain     Degenerative disc disease     GERD (gastroesophageal reflux disease)     Heel spur     Hypertension     Hypertrophy of both inferior nasal turbinates     Nasal septal deviation     Psoriasis     Sciatica        Surgical History  Past Surgical History:    Procedure Laterality Date    HYSTERECTOMY      Ileocolectomy          KNEE ARTHROSCOPY W/ DEBRIDEMENT      x 2, right    LUMBAR EPIDURAL INJECTION      REDUCTION-TURBINATE N/A 8/3/2015    [facility] OR    SEPTOPLASTY N/A 8/3/2015    [facility] at [facility] OR    TONSILLECTOMY      WRIST SURGERY Right     plate       Family History:   Family History   Problem Relation Age of Onset    Hypertension Father     Hypertension Mother     Diabetes Brother     Hypertension Brother     Prostate cancer Brother     Diabetes Sister     Hypertension Sister     Ovarian cancer Neg Hx     Breast cancer Neg Hx        Social History:   Social History     Socioeconomic History    Marital status:      Spouse name: Not on file    Number of children: Not on file    Years of education: Not on file    Highest education level: Not on file   Occupational History    Not on file   Social Needs    Financial resource strain: Not on file    Food insecurity:     Worry: Not on file     Inability: Not on file    Transportation needs:     Medical: Not on file     Non-medical: Not on file   Tobacco Use    Smoking status: Former Smoker     Packs/day: 0.25     Years: 22.00     Pack years: 5.50     Last attempt to quit: 3/1/2017     Years since quittin.3    Smokeless tobacco: Never Used    Tobacco comment: less than 1 pack week   Substance and Sexual Activity    Alcohol use: Yes     Comment: socially    Drug use: No    Sexual activity: Yes     Partners: Male     Birth control/protection: None, Surgical   Lifestyle    Physical activity:     Days per week: Not on file     Minutes per session: Not on file    Stress: Not on file   Relationships    Social connections:     Talks on phone: Not on file     Gets together: Not on file     Attends [employer] service: Not on file     Active member of club or organization: Not on file     Attends meetings of  clubs or organizations: Not on file     Relationship status: Not on file   Other Topics Concern    Not on file   Social History Narrative    Not on file       Hospitalization/Major Diagnostic Procedure:     Review of Systems     General/Constitutional:  Chills denies. Fatigue denies. Fever denies. Weight gain denies. Weight loss denies.    Respiratory:  Shortness of breath denies.    Cardiovascular:  Chest pain denies.    Gastrointestinal:  Constipation denies. Diarrhea denies. Nausea denies. Vomiting denies.     Hematology:  Easy bruising denies. Prolonged bleeding denies.     Genitourinary:  Frequent urination denies. Pain in lower back denies. Painful urination denies.     Musculoskeletal:  See HPI for details    Skin:  Rash denies.    Neurologic:  Dizziness denies. Gait abnormalities denies. Seizures denies. Tingling/Numbess denies.    Psychiatric:  Anxiety denies. Depressed mood denies.     Objective:   Vital Signs:   Vitals:    07/05/19 1144   BP: (!) 158/70   Pulse:         Physical Exam      General Examination:     Constitutional: The patient is alert and oriented to lace person and time. Mood is pleasant.     Head/Face: Normal facial features normal eyebrows    Eyes: Normal extraocular motion bilaterally    Lungs: Respirations are equal and unlabored    Gait is coordinated.    Cardiovascular: There are no swelling or varicosities present.    Lymphatic: Negative for adenopathy    Skin: Normal    Neurological: Level of consciousness normal. Oriented to place person and time and situation    Psychiatric: Oriented to time place person and situation    Moderate antalgic gait. Mild effusion. Medial and lateral JL TTP. No instability. FAROM with nml strength.    XRAY Report/ Interpretation:No new studies today      Assessment:       1. Primary osteoarthritis of right knee        Plan:       Paula was seen today for pain.    Diagnoses and all orders for this visit:    Primary osteoarthritis of right knee  -      Large Joint Aspiration/Injection: R knee         No follow-ups on file.    R knee Synvisc One IA injection. See procedure report.    This note was created using Dragon voice recognition software that occasionally misinterpreted phrases or words.

## 2019-08-09 ENCOUNTER — LAB VISIT (OUTPATIENT)
Dept: LAB | Facility: HOSPITAL | Age: 59
End: 2019-08-09
Attending: INTERNAL MEDICINE
Payer: COMMERCIAL

## 2019-08-09 DIAGNOSIS — R74.8 ACID PHOSPHATASE ELEVATED: Primary | ICD-10-CM

## 2019-08-09 PROCEDURE — 80053 COMPREHEN METABOLIC PANEL: CPT

## 2019-08-09 PROCEDURE — 36415 COLL VENOUS BLD VENIPUNCTURE: CPT

## 2019-10-31 ENCOUNTER — TELEPHONE (OUTPATIENT)
Dept: NEUROLOGY | Facility: CLINIC | Age: 59
End: 2019-10-31

## 2019-10-31 ENCOUNTER — OFFICE VISIT (OUTPATIENT)
Dept: FAMILY MEDICINE | Facility: CLINIC | Age: 59
End: 2019-10-31
Payer: COMMERCIAL

## 2019-10-31 VITALS
SYSTOLIC BLOOD PRESSURE: 138 MMHG | WEIGHT: 196 LBS | HEIGHT: 64 IN | DIASTOLIC BLOOD PRESSURE: 80 MMHG | BODY MASS INDEX: 33.46 KG/M2 | HEART RATE: 76 BPM

## 2019-10-31 DIAGNOSIS — Z12.31 SCREENING MAMMOGRAM, ENCOUNTER FOR: ICD-10-CM

## 2019-10-31 DIAGNOSIS — F41.9 ANXIETY: ICD-10-CM

## 2019-10-31 DIAGNOSIS — M15.9 PRIMARY OSTEOARTHRITIS INVOLVING MULTIPLE JOINTS: ICD-10-CM

## 2019-10-31 DIAGNOSIS — R51.9 NONINTRACTABLE EPISODIC HEADACHE, UNSPECIFIED HEADACHE TYPE: ICD-10-CM

## 2019-10-31 DIAGNOSIS — K21.9 GASTROESOPHAGEAL REFLUX DISEASE WITHOUT ESOPHAGITIS: ICD-10-CM

## 2019-10-31 DIAGNOSIS — Z78.0 POSTMENOPAUSAL: ICD-10-CM

## 2019-10-31 DIAGNOSIS — I10 ESSENTIAL HYPERTENSION: Primary | ICD-10-CM

## 2019-10-31 PROCEDURE — 3008F BODY MASS INDEX DOCD: CPT | Mod: S$GLB,,, | Performed by: NURSE PRACTITIONER

## 2019-10-31 PROCEDURE — 3079F PR MOST RECENT DIASTOLIC BLOOD PRESSURE 80-89 MM HG: ICD-10-PCS | Mod: S$GLB,,, | Performed by: NURSE PRACTITIONER

## 2019-10-31 PROCEDURE — 3075F PR MOST RECENT SYSTOLIC BLOOD PRESS GE 130-139MM HG: ICD-10-PCS | Mod: S$GLB,,, | Performed by: NURSE PRACTITIONER

## 2019-10-31 PROCEDURE — 3075F SYST BP GE 130 - 139MM HG: CPT | Mod: S$GLB,,, | Performed by: NURSE PRACTITIONER

## 2019-10-31 PROCEDURE — 99204 OFFICE O/P NEW MOD 45 MIN: CPT | Mod: S$GLB,,, | Performed by: NURSE PRACTITIONER

## 2019-10-31 PROCEDURE — 3079F DIAST BP 80-89 MM HG: CPT | Mod: S$GLB,,, | Performed by: NURSE PRACTITIONER

## 2019-10-31 PROCEDURE — 99204 PR OFFICE/OUTPT VISIT, NEW, LEVL IV, 45-59 MIN: ICD-10-PCS | Mod: S$GLB,,, | Performed by: NURSE PRACTITIONER

## 2019-10-31 PROCEDURE — 3008F PR BODY MASS INDEX (BMI) DOCUMENTED: ICD-10-PCS | Mod: S$GLB,,, | Performed by: NURSE PRACTITIONER

## 2019-10-31 RX ORDER — ALPRAZOLAM 0.5 MG/1
0.5 TABLET ORAL 2 TIMES DAILY PRN
Qty: 60 TABLET | Refills: 2 | Status: SHIPPED | OUTPATIENT
Start: 2019-10-31 | End: 2020-05-12 | Stop reason: SDUPTHER

## 2019-10-31 RX ORDER — BUTALBITAL, ACETAMINOPHEN AND CAFFEINE 50; 325; 40 MG/1; MG/1; MG/1
1 TABLET ORAL EVERY 6 HOURS PRN
Refills: 0 | COMMUNITY
Start: 2019-10-18 | End: 2020-10-27

## 2019-10-31 NOTE — PROGRESS NOTES
SUBJECTIVE:    Patient ID: Paula Zamora is a 59 y.o. female.    Chief Complaint: Establish Care ()    Pt here for new pt appt- former pt of Dr. Cisneros.  Hx of Headaches off/on for years- may go months between headaches. reports will have pain and sensitivity to touch to left scalp with headache. Saw headache/allergist specialist on the [facility] a few weeks ago and told she had mild dairy allergy. Was advised to see neurologist but hasn't scheduled appt. Denies any n/v, vision or speech changes with headache.   Recently joined gym and starting to work out- has been having pain to left plantar arch- worse with standing. Also has some intermittent left sciatica pain and knee OA pain  Pt reports she has appt scheduled with Dr. Ruiz for f/u cscope soon      Orders Only on 10/21/2019   Component Date Value Ref Range Status    Sed Rate 10/21/2019 2  < OR = 30 mm/h Final    CRP 10/21/2019 6.5  <8.0 mg/L Final   Lab Visit on 08/09/2019   Component Date Value Ref Range Status    Sodium 08/09/2019 142  136 - 145 mmol/L Final    Potassium 08/09/2019 4.2  3.5 - 5.1 mmol/L Final    Chloride 08/09/2019 105  95 - 110 mmol/L Final    CO2 08/09/2019 29  23 - 29 mmol/L Final    Glucose 08/09/2019 102  70 - 110 mg/dL Final    BUN, Bld 08/09/2019 16  6 - 20 mg/dL Final    Creatinine 08/09/2019 0.9  0.5 - 1.4 mg/dL Final    Calcium 08/09/2019 9.2  8.7 - 10.5 mg/dL Final    Total Protein 08/09/2019 6.9  6.0 - 8.4 g/dL Final    Albumin 08/09/2019 4.0  3.5 - 5.2 g/dL Final    Total Bilirubin 08/09/2019 0.6  0.1 - 1.0 mg/dL Final    Alkaline Phosphatase 08/09/2019 70  55 - 135 U/L Final    AST 08/09/2019 20  10 - 40 U/L Final    ALT 08/09/2019 20  10 - 44 U/L Final    Anion Gap 08/09/2019 8  8 - 16 mmol/L Final    eGFR if African American 08/09/2019 >60.0  >60 mL/min/1.73 m^2 Final    eGFR if non African American 08/09/2019 >60.0  >60 mL/min/1.73 m^2 Final   Orders Only on 05/14/2019   Component Date Value Ref  Range Status    Glucose 05/14/2019 98  70 - 99 mg/dL Final    BUN, Bld 05/14/2019 21* 8 - 20 mg/dL Final    Creatinine 05/14/2019 0.91  0.60 - 1.40 mg/dL Final    Calcium 05/14/2019 8.9  7.7 - 10.4 mg/dL Final    Sodium 05/14/2019 141  134 - 144 mmol/L Final    Potassium 05/14/2019 4.7  3.5 - 5.0 mmol/L Final    Chloride 05/14/2019 108  98 - 110 mmol/L Final    CO2 05/14/2019 29.1  22.8 - 31.6 mmol/L Final    Albumin 05/14/2019 3.6  3.1 - 4.7 g/dL Final    Total Bilirubin 05/14/2019 0.9  0.3 - 1.0 mg/dL Final    Alkaline Phosphatase 05/14/2019 68  40 - 104 IU/L Final    Total Protein 05/14/2019 6.5  6.0 - 8.2 g/dL Final    ALT (SGPT) 05/14/2019 19  3 - 33 IU/L Final    AST 05/14/2019 20  10 - 40 IU/L Final       Past Medical History:   Diagnosis Date    Back pain     Degenerative disc disease     GERD (gastroesophageal reflux disease)     Heel spur     Hypertension     Hypertrophy of both inferior nasal turbinates     Nasal septal deviation     Psoriasis     Sciatica      Past Surgical History:   Procedure Laterality Date    HYSTERECTOMY  1999    Ileocolectomy  2000        KNEE ARTHROSCOPY W/ DEBRIDEMENT      x 2, right    LUMBAR EPIDURAL INJECTION      TONSILLECTOMY      WRIST SURGERY Right     plate     Family History   Problem Relation Age of Onset    Hypertension Father     Cancer Father     Hypertension Mother     Heart disease Mother     Diabetes Brother     Hypertension Brother     Prostate cancer Brother     Diabetes Sister     Hypertension Sister     Ovarian cancer Neg Hx     Breast cancer Neg Hx        Marital Status:   Alcohol History:  reports that she drinks alcohol.  Tobacco History:  reports that she quit smoking about 2 years ago. She has a 5.50 pack-year smoking history. She has never used smokeless tobacco.  Drug History:  reports that she does not use drugs.    Review of patient's allergies indicates:   Allergen Reactions    Statins-hmg-coa  reductase inhibitors Other (See Comments)     Muscle and joint pain      Naproxen Itching    Penicillins Itching     Pt states can take Keflex    Pineapple     Sulfa (sulfonamide antibiotics) Itching       Current Outpatient Medications:     ALPRAZolam (XANAX) 0.5 MG tablet, Take 1 tablet (0.5 mg total) by mouth 2 (two) times daily as needed., Disp: 60 tablet, Rfl: 2    butalbital-acetaminophen-caffeine -40 mg (FIORICET, ESGIC) -40 mg per tablet, Take 1 tablet by mouth every 6 (six) hours as needed., Disp: , Rfl: 0    estradiol (ESTRACE) 1 MG tablet, Take 1 mg by mouth once daily. , Disp: , Rfl:     fluticasone (FLONASE) 50 mcg/actuation nasal spray, 1 spray by Each Nare route once daily., Disp: , Rfl:     ibuprofen (ADVIL,MOTRIN) 800 MG tablet, TAKE 1 TABLET BY MOUTH THREE TIMES DAILY( EVERY EIGHT HOURS), Disp: 60 tablet, Rfl: 0    levocetirizine (XYZAL) 5 MG tablet, Take 5 mg by mouth every evening., Disp: , Rfl:     metoprolol tartrate (LOPRESSOR) 25 MG tablet, TAKE 1 TABLET BY MOUTH EVERY 12 HOURS, Disp: 180 tablet, Rfl: 1    pantoprazole (PROTONIX) 40 MG tablet, Take 40 mg by mouth once daily., Disp: , Rfl:     spironolactone (ALDACTONE) 25 MG tablet, Take 25 mg by mouth every other day., Disp: , Rfl:     aspirin (ECOTRIN) 81 MG EC tablet, Take 81 mg by mouth once daily., Disp: , Rfl:     diclofenac sodium (PENNSAID) 2 % SoPk, Apply 40 mg topically 2 (two) times daily., Disp: 120 g, Rfl: 3    Review of Systems   Constitutional: Negative for appetite change, chills and fever.   Respiratory: Negative for cough, shortness of breath and wheezing.    Cardiovascular: Negative for chest pain, palpitations and leg swelling.   Gastrointestinal: Negative for abdominal pain, constipation and diarrhea.   Genitourinary: Negative for dysuria, frequency and hematuria.   Musculoskeletal: Positive for back pain. Negative for gait problem.   Skin: Negative for rash.   Neurological: Positive for  "headaches. Negative for dizziness, syncope and numbness.   Psychiatric/Behavioral: Negative for dysphoric mood. The patient is not nervous/anxious.           Objective:      Vitals:    10/31/19 1042   BP: 138/80   Pulse: 76   Weight: 88.9 kg (196 lb)   Height: 5' 4" (1.626 m)     Physical Exam   Constitutional: She is oriented to person, place, and time. She appears well-developed and well-nourished.   HENT:   Head: Normocephalic and atraumatic.   Right Ear: Tympanic membrane and ear canal normal.   Left Ear: Tympanic membrane and ear canal normal.   Mouth/Throat: Mucous membranes are normal. No posterior oropharyngeal erythema.   Neck: Neck supple. Carotid bruit is not present.   Cardiovascular: Normal rate and regular rhythm. Exam reveals no gallop and no friction rub.   No murmur heard.  Pulmonary/Chest: Effort normal and breath sounds normal. No respiratory distress. She has no wheezes. She has no rales.   Abdominal: Soft. She exhibits no distension. There is no tenderness.   Lymphadenopathy:     She has no cervical adenopathy.   Neurological: She is alert and oriented to person, place, and time. She has normal strength. Gait normal.   Skin: Skin is warm and dry. No rash noted.   Psychiatric: She has a normal mood and affect.   Nursing note and vitals reviewed.        Assessment:       1. Essential hypertension    2. Nonintractable episodic headache, unspecified headache type    3. Gastroesophageal reflux disease without esophagitis    4. Primary osteoarthritis involving multiple joints    5. Anxiety    6. Postmenopausal    7. Screening mammogram, encounter for           Plan:       Essential hypertension  -     CBC auto differential; Future; Expected date: 10/31/2019  -     Comprehensive metabolic panel; Future; Expected date: 10/31/2019  -     Lipid panel; Future; Expected date: 10/31/2019  -     Microalbumin/creatinine urine ratio; Future; Expected date: 10/31/2019  -     Urinalysis; Future; Expected date: " 10/31/2019  -     TSH w/reflex to FT4; Future; Expected date: 10/31/2019  -BP well controlled.  Baseline labs ordered    Gastroesophageal reflux disease without esophagitis  -stable    Postmenopausal  -discussed increased risk of estrogen therapy including breast CA, thromboembolic disease and heart disease. Recommended trying to reduce dose to every 2nd/3rd day     Primary osteoarthritis involving multiple joints  -has some mild lower back and knee pain c/o's, stable at present- encouraged regular exercise and stretching exercises    Episodic headache, not intractable  -     Ambulatory referral to Neurology  -discussed referral to Dr. Shine [facility] neurolog[facility] for further eval/trt    Anxiety  -     ALPRAZolam (XANAX) 0.5 MG tablet; Take 1 tablet (0.5 mg total) by mouth 2 (two) times daily as needed.  Dispense: 60 tablet; Refill: 2  -stable, uses prn    Screening mammogram, encounter for  -     Mammo Digital Screening Bilat; Future; Expected date: 11/29/2019      Follow up in about 6 months (around 4/30/2020) for Dr. Albright next visit, HTN, Labs before next visit.        10/31/2019 Danyelle Ragland NP

## 2019-10-31 NOTE — TELEPHONE ENCOUNTER
Called patient in regards to new patient appointment. Appointment successfully scheduled. Patient advised to arrive 15 minutes early , location of clinic , and to bring any outside records. Patient verbalized understanding.

## 2019-10-31 NOTE — TELEPHONE ENCOUNTER
----- Message from Pepper Osman sent at 10/31/2019 12:02 PM CDT -----  Contact: self  .Type:   Appointment Request    Caller is requesting a appointment.    Name of Caller: Paula Hanna   When is the first available appointment?  [facility] would not allow scheduling   Symptoms:  Headaches  Best Call Back Number:  [phone]     Additional Information:

## 2019-11-20 ENCOUNTER — HOSPITAL ENCOUNTER (OUTPATIENT)
Dept: RADIOLOGY | Facility: HOSPITAL | Age: 59
Discharge: HOME | End: 2019-11-20
Attending: NURSE PRACTITIONER
Payer: COMMERCIAL

## 2019-11-20 ENCOUNTER — OFFICE VISIT (OUTPATIENT)
Dept: ORTHOPEDICS | Facility: CLINIC | Age: 59
End: 2019-11-20
Payer: COMMERCIAL

## 2019-11-20 VITALS — HEIGHT: 64 IN | WEIGHT: 196 LBS | BODY MASS INDEX: 33.46 KG/M2

## 2019-11-20 VITALS — DIASTOLIC BLOOD PRESSURE: 90 MMHG | WEIGHT: 196 LBS | BODY MASS INDEX: 33.64 KG/M2 | SYSTOLIC BLOOD PRESSURE: 149 MMHG

## 2019-11-20 DIAGNOSIS — Z12.31 SCREENING MAMMOGRAM, ENCOUNTER FOR: ICD-10-CM

## 2019-11-20 DIAGNOSIS — G57.92 NEUROPATHY OF LEFT FOOT: ICD-10-CM

## 2019-11-20 DIAGNOSIS — M47.816 LUMBAR FACET ARTHROPATHY: ICD-10-CM

## 2019-11-20 DIAGNOSIS — M51.36 DISC DEGENERATION, LUMBAR: ICD-10-CM

## 2019-11-20 DIAGNOSIS — M17.11 PRIMARY OSTEOARTHRITIS OF RIGHT KNEE: Primary | ICD-10-CM

## 2019-11-20 PROCEDURE — 3008F PR BODY MASS INDEX (BMI) DOCUMENTED: ICD-10-PCS | Mod: S$GLB,,, | Performed by: ORTHOPAEDIC SURGERY

## 2019-11-20 PROCEDURE — 99213 OFFICE O/P EST LOW 20 MIN: CPT | Mod: 25,S$GLB,, | Performed by: ORTHOPAEDIC SURGERY

## 2019-11-20 PROCEDURE — 77067 SCR MAMMO BI INCL CAD: CPT | Mod: TC,PO

## 2019-11-20 PROCEDURE — 3008F BODY MASS INDEX DOCD: CPT | Mod: S$GLB,,, | Performed by: ORTHOPAEDIC SURGERY

## 2019-11-20 PROCEDURE — 3080F DIAST BP >= 90 MM HG: CPT | Mod: S$GLB,,, | Performed by: ORTHOPAEDIC SURGERY

## 2019-11-20 PROCEDURE — 20610 DRAIN/INJ JOINT/BURSA W/O US: CPT | Mod: RT,S$GLB,, | Performed by: ORTHOPAEDIC SURGERY

## 2019-11-20 PROCEDURE — 99213 PR OFFICE/OUTPT VISIT, EST, LEVL III, 20-29 MIN: ICD-10-PCS | Mod: 25,S$GLB,, | Performed by: ORTHOPAEDIC SURGERY

## 2019-11-20 PROCEDURE — 3077F SYST BP >= 140 MM HG: CPT | Mod: S$GLB,,, | Performed by: ORTHOPAEDIC SURGERY

## 2019-11-20 PROCEDURE — 3080F PR MOST RECENT DIASTOLIC BLOOD PRESSURE >= 90 MM HG: ICD-10-PCS | Mod: S$GLB,,, | Performed by: ORTHOPAEDIC SURGERY

## 2019-11-20 PROCEDURE — 20610 LARGE JOINT ASPIRATION/INJECTION: R KNEE: ICD-10-PCS | Mod: RT,S$GLB,, | Performed by: ORTHOPAEDIC SURGERY

## 2019-11-20 PROCEDURE — 3077F PR MOST RECENT SYSTOLIC BLOOD PRESSURE >= 140 MM HG: ICD-10-PCS | Mod: S$GLB,,, | Performed by: ORTHOPAEDIC SURGERY

## 2019-11-20 RX ORDER — POLYETHYLENE GLYCOL 3350 17 G/17G
17 POWDER, FOR SOLUTION ORAL DAILY
Refills: 0 | COMMUNITY
Start: 2019-11-04

## 2019-11-20 RX ORDER — METHYLPREDNISOLONE ACETATE 40 MG/ML
40 INJECTION, SUSPENSION INTRA-ARTICULAR; INTRALESIONAL; INTRAMUSCULAR; SOFT TISSUE
Status: DISCONTINUED | OUTPATIENT
Start: 2019-11-20 | End: 2019-11-20 | Stop reason: HOSPADM

## 2019-11-20 RX ADMIN — METHYLPREDNISOLONE ACETATE 40 MG: 40 INJECTION, SUSPENSION INTRA-ARTICULAR; INTRALESIONAL; INTRAMUSCULAR; SOFT TISSUE at 09:11

## 2019-11-20 NOTE — PROGRESS NOTES
Subjective:       Patient ID: Paula Zamora is a 59 y.o. female.    Chief Complaint: Pain of the Right Knee (right knee pain x years off and on, wants an injection,)      History of Present Illness  Continued symptoms off and on of right knee pain and wants to have a repeat injection.  The Visco supplement injection taken injection given in July did help with his started to wear off additionally she has some numbness and burning on the plantar aspect of the left foot.    Current Medications  Current Outpatient Medications   Medication Sig Dispense Refill    ALPRAZolam (XANAX) 0.5 MG tablet Take 1 tablet (0.5 mg total) by mouth 2 (two) times daily as needed. 60 tablet 2    aspirin (ECOTRIN) 81 MG EC tablet Take 81 mg by mouth once daily.      butalbital-acetaminophen-caffeine -40 mg (FIORICET, ESGIC) -40 mg per tablet Take 1 tablet by mouth every 6 (six) hours as needed.  0    diclofenac sodium (PENNSAID) 2 % SoPk Apply 40 mg topically 2 (two) times daily. 112 g 3    estradiol (ESTRACE) 1 MG tablet Take 1 mg by mouth once daily.       fluticasone (FLONASE) 50 mcg/actuation nasal spray 1 spray by Each Nare route once daily.      ibuprofen (ADVIL,MOTRIN) 800 MG tablet TAKE 1 TABLET BY MOUTH THREE TIMES DAILY( EVERY EIGHT HOURS) 60 tablet 0    levocetirizine (XYZAL) 5 MG tablet Take 5 mg by mouth every evening.      metoprolol tartrate (LOPRESSOR) 25 MG tablet TAKE 1 TABLET BY MOUTH EVERY 12 HOURS 180 tablet 1    pantoprazole (PROTONIX) 40 MG tablet Take 40 mg by mouth once daily.      polyethylene glycol (GLYCOLAX) 17 gram/dose powder TK 17 GRAMS QAM  0    spironolactone (ALDACTONE) 25 MG tablet Take 25 mg by mouth every other day.       No current facility-administered medications for this visit.        Allergies  Review of patient's allergies indicates:   Allergen Reactions    Statins-hmg-coa reductase inhibitors Other (See Comments)     Muscle and joint pain      Naproxen Itching     Penicillins Itching     Pt states can take Keflex    Pineapple     Sulfa (sulfonamide antibiotics) Itching       Past Medical History  Past Medical History:   Diagnosis Date    Back pain     Degenerative disc disease     GERD (gastroesophageal reflux disease)     Heel spur     Hypertension     Hypertrophy of both inferior nasal turbinates     Nasal septal deviation     Psoriasis     Sciatica        Surgical History  Past Surgical History:   Procedure Laterality Date    HYSTERECTOMY      Ileocolectomy          KNEE ARTHROSCOPY W/ DEBRIDEMENT      x 2, right    LUMBAR EPIDURAL INJECTION      TONSILLECTOMY      WRIST SURGERY Right     plate       Family History:   Family History   Problem Relation Age of Onset    Hypertension Father     Cancer Father     Hypertension Mother     Heart disease Mother     Diabetes Brother     Hypertension Brother     Prostate cancer Brother     Diabetes Sister     Hypertension Sister     Ovarian cancer Neg Hx     Breast cancer Neg Hx        Social History:   Social History     Socioeconomic History    Marital status:      Spouse name: Not on file    Number of children: Not on file    Years of education: Not on file    Highest education level: Not on file   Occupational History    Not on file   Social Needs    Financial resource strain: Not on file    Food insecurity:     Worry: Not on file     Inability: Not on file    Transportation needs:     Medical: Not on file     Non-medical: Not on file   Tobacco Use    Smoking status: Former Smoker     Packs/day: 0.25     Years: 22.00     Pack years: 5.50     Last attempt to quit: 3/1/2017     Years since quittin.7    Smokeless tobacco: Never Used    Tobacco comment: less than 1 pack week   Substance and Sexual Activity    Alcohol use: Yes     Comment: socially    Drug use: No    Sexual activity: Yes     Partners: Male     Birth control/protection: None, Surgical   Lifestyle     Physical activity:     Days per week: Not on file     Minutes per session: Not on file    Stress: Not on file   Relationships    Social connections:     Talks on phone: Not on file     Gets together: Not on file     Attends [employer] service: Not on file     Active member of club or organization: Not on file     Attends meetings of clubs or organizations: Not on file     Relationship status: Not on file   Other Topics Concern    Not on file   Social History Narrative    Not on file       Hospitalization/Major Diagnostic Procedure:     Review of Systems     General/Constitutional:  Chills denies. Fatigue denies. Fever denies. Weight gain denies. Weight loss denies.    Respiratory:  Shortness of breath denies.    Cardiovascular:  Chest pain denies.    Gastrointestinal:  Constipation denies. Diarrhea denies. Nausea denies. Vomiting denies.     Hematology:  Easy bruising denies. Prolonged bleeding denies.     Genitourinary:  Frequent urination denies. Pain in lower back denies. Painful urination denies.     Musculoskeletal:  See HPI for details    Skin:  Rash denies.    Neurologic:  Dizziness denies. Gait abnormalities denies. Seizures denies. Tingling/Numbess denies.    Psychiatric:  Anxiety denies. Depressed mood denies.     Objective:   Vital Signs:   Vitals:    11/20/19 0909   BP: (!) 149/90        Physical Exam      General Examination:     Constitutional: The patient is alert and oriented to lace person and time. Mood is pleasant.     Head/Face: Normal facial features normal eyebrows    Eyes: Normal extraocular motion bilaterally    Lungs: Respirations are equal and unlabored    Gait is coordinated.    Cardiovascular: There are no swelling or varicosities present.    Lymphatic: Negative for adenopathy    Skin: Normal    Neurological: Level of consciousness normal. Oriented to place person and time and situation    Psychiatric: Oriented to time place person and situation    Right knee examination shows mild  tibia vera tenderness of the medial and lateral joint lines pain with valgus stress testing no crepitance and negative Francesca's test  XRAY Report/ Interpretation:  AP and lateral x-rays of the right knee were taken today there is some medial joint space narrowing noted.  Previous MRI right knee results reviewed      Assessment:       1. Primary osteoarthritis of right knee    2. Lumbar facet arthropathy    3. Disc degeneration, lumbar    4. Neuropathy of left foot        Plan:       Paula was seen today for pain.    Diagnoses and all orders for this visit:    Primary osteoarthritis of right knee  -     X-Ray Knee 1 or 2 View Right  -     Large Joint Aspiration/Injection: R knee  -     diclofenac sodium (PENNSAID) 2 % SoPk; Apply 40 mg topically 2 (two) times daily.    Lumbar facet arthropathy  -     diclofenac sodium (PENNSAID) 2 % SoPk; Apply 40 mg topically 2 (two) times daily.    Disc degeneration, lumbar  -     diclofenac sodium (PENNSAID) 2 % SoPk; Apply 40 mg topically 2 (two) times daily.    Neuropathy of left foot         Follow up if symptoms worsen or fail to improve.  Under sterile conditions steroid xylocaine injection given to right knee without any side effects noted  Observation advised for right knee.  Should steroid injection be ineffective patient will call back and after 6 months we will suggest the repeat viscosupplementation injection in right knee otherwise returns 3 months    This note was created using Dragon voice recognition software that occasionally misinterpreted phrases or words.

## 2019-11-20 NOTE — PROCEDURES
Large Joint Aspiration/Injection: R knee  Date/Time: 11/20/2019 9:30 AM  Performed by: Zach Abdul MD  Authorized by: Zach Abdul MD     Consent Done?:  Yes (Verbal)  Indications:  Pain  Procedure site marked: Yes    Timeout: Prior to procedure the correct patient, procedure, and site was verified    Anesthesia  Local anesthesia used  Anesthetic: lidocaine 1% without epinephrine    Location:  Knee  Site:  R knee  Prep: Patient was prepped and draped in usual sterile fashion    Needle size:  25 G  Medications:  40 mg methylPREDNISolone acetate 40 mg/mL; 40 mg methylPREDNISolone acetate 40 mg/mL  Patient tolerance:  Patient tolerated the procedure well with no immediate complications

## 2019-11-22 ENCOUNTER — TELEPHONE (OUTPATIENT)
Dept: FAMILY MEDICINE | Facility: CLINIC | Age: 59
End: 2019-11-22

## 2019-11-22 NOTE — TELEPHONE ENCOUNTER
----- Message from Danyelle Matute sent at 11/22/2019 10:51 AM CST -----  Pt has a very bad cough, headache and sore throat. Can we call something in for her? [facility] [address]. Pt #[phone]

## 2019-11-22 NOTE — TELEPHONE ENCOUNTER
Spoke with pt per Danyelle no fever, sx just started late last night. Pt needs to treat her sx. Its viral at this point. If sx dont improve then she can call back.

## 2020-01-10 ENCOUNTER — LAB VISIT (OUTPATIENT)
Dept: LAB | Facility: HOSPITAL | Age: 60
End: 2020-01-10
Attending: INTERNAL MEDICINE
Payer: COMMERCIAL

## 2020-01-10 DIAGNOSIS — I10 ESSENTIAL HYPERTENSION, MALIGNANT: ICD-10-CM

## 2020-01-10 DIAGNOSIS — R74.8 ACID PHOSPHATASE ELEVATED: Primary | ICD-10-CM

## 2020-01-10 DIAGNOSIS — Z79.899 ENCOUNTER FOR LONG-TERM (CURRENT) USE OF OTHER MEDICATIONS: ICD-10-CM

## 2020-01-10 DIAGNOSIS — Z79.82 ENCOUNTER FOR LONG-TERM (CURRENT) USE OF ASPIRIN: ICD-10-CM

## 2020-01-10 LAB
ALBUMIN SERPL BCP-MCNC: 4.2 G/DL (ref 3.5–5.2)
ALP SERPL-CCNC: 75 U/L (ref 55–135)
ALT SERPL W/O P-5'-P-CCNC: 22 U/L (ref 10–44)
ANION GAP SERPL CALC-SCNC: 6 MMOL/L (ref 8–16)
AST SERPL-CCNC: 18 U/L (ref 10–40)
BASOPHILS # BLD AUTO: 0.04 K/UL (ref 0–0.2)
BASOPHILS NFR BLD: 0.8 % (ref 0–1.9)
BILIRUB SERPL-MCNC: 0.8 MG/DL (ref 0.1–1)
BUN SERPL-MCNC: 19 MG/DL (ref 8–23)
CALCIUM SERPL-MCNC: 9.5 MG/DL (ref 8.7–10.5)
CHLORIDE SERPL-SCNC: 107 MMOL/L (ref 95–110)
CO2 SERPL-SCNC: 30 MMOL/L (ref 23–29)
CREAT SERPL-MCNC: 0.9 MG/DL (ref 0.5–1.4)
DIFFERENTIAL METHOD: ABNORMAL
EOSINOPHIL # BLD AUTO: 0.1 K/UL (ref 0–0.5)
EOSINOPHIL NFR BLD: 1.9 % (ref 0–8)
ERYTHROCYTE [DISTWIDTH] IN BLOOD BY AUTOMATED COUNT: 15.1 % (ref 11.5–14.5)
EST. GFR  (AFRICAN AMERICAN): >60 ML/MIN/1.73 M^2
EST. GFR  (NON AFRICAN AMERICAN): >60 ML/MIN/1.73 M^2
GLUCOSE SERPL-MCNC: 101 MG/DL (ref 70–110)
HCT VFR BLD AUTO: 39.5 % (ref 37–48.5)
HGB BLD-MCNC: 12.5 G/DL (ref 12–16)
IMM GRANULOCYTES # BLD AUTO: 0.01 K/UL (ref 0–0.04)
IMM GRANULOCYTES NFR BLD AUTO: 0.2 % (ref 0–0.5)
LYMPHOCYTES # BLD AUTO: 2.2 K/UL (ref 1–4.8)
LYMPHOCYTES NFR BLD: 46.8 % (ref 18–48)
MCH RBC QN AUTO: 28.7 PG (ref 27–31)
MCHC RBC AUTO-ENTMCNC: 31.6 G/DL (ref 32–36)
MCV RBC AUTO: 91 FL (ref 82–98)
MONOCYTES # BLD AUTO: 0.4 K/UL (ref 0.3–1)
MONOCYTES NFR BLD: 8.6 % (ref 4–15)
NEUTROPHILS # BLD AUTO: 2 K/UL (ref 1.8–7.7)
NEUTROPHILS NFR BLD: 41.7 % (ref 38–73)
NRBC BLD-RTO: 0 /100 WBC
PLATELET # BLD AUTO: 210 K/UL (ref 150–350)
PMV BLD AUTO: 10.7 FL (ref 9.2–12.9)
POTASSIUM SERPL-SCNC: 4.8 MMOL/L (ref 3.5–5.1)
PROT SERPL-MCNC: 7.4 G/DL (ref 6–8.4)
RBC # BLD AUTO: 4.35 M/UL (ref 4–5.4)
SODIUM SERPL-SCNC: 143 MMOL/L (ref 136–145)
WBC # BLD AUTO: 4.79 K/UL (ref 3.9–12.7)

## 2020-01-10 PROCEDURE — 80053 COMPREHEN METABOLIC PANEL: CPT

## 2020-01-10 PROCEDURE — 36415 COLL VENOUS BLD VENIPUNCTURE: CPT

## 2020-01-10 PROCEDURE — 85025 COMPLETE CBC W/AUTO DIFF WBC: CPT

## 2020-01-10 NOTE — PROGRESS NOTES
Please call pt- looks like she must have had labs drawn at [facility] for Dr. Ruiz/Tiffany orders but none of the labs I ordered were done since mine were ordered for [facility]. Can let her know her kidney function and blood count is within normal range and Alk phos/AST previously elevated last year are within normal range. I still recommend she have the TSH, lipids, UA/micro collected.

## 2020-01-13 ENCOUNTER — TELEPHONE (OUTPATIENT)
Dept: FAMILY MEDICINE | Facility: CLINIC | Age: 60
End: 2020-01-13

## 2020-01-13 NOTE — TELEPHONE ENCOUNTER
----- Message from Danyelle Ragland NP sent at 1/10/2020  5:42 PM CST -----  Please call pt- looks like she must have had labs drawn at [facility] for Dr. Ruiz/Tiffany orders but none of the labs I ordered were done since mine were ordered for [facility]. Can let her know her kidney function and blood count is within normal range and Alk phos/AST previously elevated last year are within normal range. I still recommend she have the TSH, lipids, UA/micro collected.

## 2020-01-13 NOTE — TELEPHONE ENCOUNTER
Spoke to patient with results verbatim per Danyelle. States she will come Wednesday for rest of labs. I told her to ask for me before she gets blood drawn so that I can let [facility] know which orders she needs as not to duplicate anything that was already done. Remind me created for lab.

## 2020-01-19 LAB
ALBUMIN SERPL-MCNC: 4.1 G/DL (ref 3.6–5.1)
ALBUMIN/CREAT UR: 9 MCG/MG CREAT
ALBUMIN/GLOB SERPL: 1.9 (CALC) (ref 1–2.5)
ALP SERPL-CCNC: 84 U/L (ref 33–130)
ALT SERPL-CCNC: 18 U/L (ref 6–29)
AST SERPL-CCNC: 16 U/L (ref 10–35)
BASOPHILS # BLD AUTO: 20 CELLS/UL (ref 0–200)
BASOPHILS NFR BLD AUTO: 0.4 %
BILIRUB SERPL-MCNC: 0.4 MG/DL (ref 0.2–1.2)
BUN SERPL-MCNC: 18 MG/DL (ref 7–25)
BUN/CREAT SERPL: NORMAL (CALC) (ref 6–22)
CALCIUM SERPL-MCNC: 9 MG/DL (ref 8.6–10.4)
CHLORIDE SERPL-SCNC: 107 MMOL/L (ref 98–110)
CHOLEST SERPL-MCNC: 163 MG/DL
CHOLEST/HDLC SERPL: 2.3 (CALC)
CO2 SERPL-SCNC: 28 MMOL/L (ref 20–32)
CREAT SERPL-MCNC: 0.84 MG/DL (ref 0.5–0.99)
CREAT UR-MCNC: 180 MG/DL (ref 20–275)
EOSINOPHIL # BLD AUTO: 80 CELLS/UL (ref 15–500)
EOSINOPHIL NFR BLD AUTO: 1.6 %
ERYTHROCYTE [DISTWIDTH] IN BLOOD BY AUTOMATED COUNT: 14 % (ref 11–15)
GFRSERPLBLD MDRD-ARVRAT: 75 ML/MIN/1.73M2
GLOBULIN SER CALC-MCNC: 2.2 G/DL (CALC) (ref 1.9–3.7)
GLUCOSE SERPL-MCNC: 96 MG/DL (ref 65–99)
HCT VFR BLD AUTO: 35.6 % (ref 35–45)
HDLC SERPL-MCNC: 70 MG/DL
HGB BLD-MCNC: 11.7 G/DL (ref 11.7–15.5)
LDLC SERPL CALC-MCNC: 78 MG/DL (CALC)
LYMPHOCYTES # BLD AUTO: 2235 CELLS/UL (ref 850–3900)
LYMPHOCYTES NFR BLD AUTO: 44.7 %
MCH RBC QN AUTO: 29.3 PG (ref 27–33)
MCHC RBC AUTO-ENTMCNC: 32.9 G/DL (ref 32–36)
MCV RBC AUTO: 89 FL (ref 80–100)
MICROALBUMIN UR-MCNC: 1.6 MG/DL
MONOCYTES # BLD AUTO: 425 CELLS/UL (ref 200–950)
MONOCYTES NFR BLD AUTO: 8.5 %
NEUTROPHILS # BLD AUTO: 2240 CELLS/UL (ref 1500–7800)
NEUTROPHILS NFR BLD AUTO: 44.8 %
NONHDLC SERPL-MCNC: 93 MG/DL (CALC)
PLATELET # BLD AUTO: 211 THOUSAND/UL (ref 140–400)
PMV BLD REES-ECKER: 11 FL (ref 7.5–12.5)
POTASSIUM SERPL-SCNC: 4.5 MMOL/L (ref 3.5–5.3)
PROT SERPL-MCNC: 6.3 G/DL (ref 6.1–8.1)
RBC # BLD AUTO: 4 MILLION/UL (ref 3.8–5.1)
SODIUM SERPL-SCNC: 144 MMOL/L (ref 135–146)
TRIGL SERPL-MCNC: 69 MG/DL
TSH SERPL-ACNC: 0.88 MIU/L (ref 0.4–4.5)
WBC # BLD AUTO: 5 THOUSAND/UL (ref 3.8–10.8)

## 2020-01-20 ENCOUNTER — TELEPHONE (OUTPATIENT)
Dept: FAMILY MEDICINE | Facility: CLINIC | Age: 60
End: 2020-01-20

## 2020-01-20 NOTE — TELEPHONE ENCOUNTER
----- Message from Homar Albright MD sent at 1/18/2020  6:54 PM CST -----  Call patient.  Her lab work looks excellent.  Cholesterol normal at 163 ;sugar, kidneys and liver all look stable.  Continue current medications

## 2020-02-13 ENCOUNTER — HOSPITAL ENCOUNTER (OUTPATIENT)
Dept: RADIOLOGY | Facility: CLINIC | Age: 60
Discharge: HOME | End: 2020-02-13
Attending: PODIATRIST
Payer: COMMERCIAL

## 2020-02-13 ENCOUNTER — OFFICE VISIT (OUTPATIENT)
Dept: PODIATRY | Facility: CLINIC | Age: 60
End: 2020-02-13
Payer: COMMERCIAL

## 2020-02-13 VITALS
BODY MASS INDEX: 35.26 KG/M2 | HEART RATE: 80 BPM | WEIGHT: 199 LBS | DIASTOLIC BLOOD PRESSURE: 77 MMHG | HEIGHT: 63 IN | SYSTOLIC BLOOD PRESSURE: 136 MMHG

## 2020-02-13 DIAGNOSIS — M72.2 PLANTAR FASCIITIS: Primary | ICD-10-CM

## 2020-02-13 DIAGNOSIS — M79.672 LEFT FOOT PAIN: ICD-10-CM

## 2020-02-13 PROCEDURE — 3008F PR BODY MASS INDEX (BMI) DOCUMENTED: ICD-10-PCS | Mod: S$GLB,,, | Performed by: PODIATRIST

## 2020-02-13 PROCEDURE — 99213 OFFICE O/P EST LOW 20 MIN: CPT | Mod: 25,S$GLB,, | Performed by: PODIATRIST

## 2020-02-13 PROCEDURE — 20550 PR INJECT TENDON SHEATH/LIGAMENT: ICD-10-PCS | Mod: LT,S$GLB,, | Performed by: PODIATRIST

## 2020-02-13 PROCEDURE — 3078F PR MOST RECENT DIASTOLIC BLOOD PRESSURE < 80 MM HG: ICD-10-PCS | Mod: S$GLB,,, | Performed by: PODIATRIST

## 2020-02-13 PROCEDURE — 3075F SYST BP GE 130 - 139MM HG: CPT | Mod: S$GLB,,, | Performed by: PODIATRIST

## 2020-02-13 PROCEDURE — 20550 NJX 1 TENDON SHEATH/LIGAMENT: CPT | Mod: LT,S$GLB,, | Performed by: PODIATRIST

## 2020-02-13 PROCEDURE — 3008F BODY MASS INDEX DOCD: CPT | Mod: S$GLB,,, | Performed by: PODIATRIST

## 2020-02-13 PROCEDURE — 99213 PR OFFICE/OUTPT VISIT, EST, LEVL III, 20-29 MIN: ICD-10-PCS | Mod: 25,S$GLB,, | Performed by: PODIATRIST

## 2020-02-13 PROCEDURE — 3075F PR MOST RECENT SYSTOLIC BLOOD PRESS GE 130-139MM HG: ICD-10-PCS | Mod: S$GLB,,, | Performed by: PODIATRIST

## 2020-02-13 PROCEDURE — 3078F DIAST BP <80 MM HG: CPT | Mod: S$GLB,,, | Performed by: PODIATRIST

## 2020-02-13 PROCEDURE — 73630 X-RAY EXAM OF FOOT: CPT | Mod: LT,S$GLB,, | Performed by: PODIATRIST

## 2020-02-13 PROCEDURE — 73630 XR FOOT COMPLETE 3 VIEW LEFT: ICD-10-PCS | Mod: LT,S$GLB,, | Performed by: PODIATRIST

## 2020-02-13 RX ORDER — IBUPROFEN 800 MG/1
TABLET ORAL
Qty: 60 TABLET | Refills: 0 | Status: SHIPPED | OUTPATIENT
Start: 2020-02-13 | End: 2020-03-19 | Stop reason: SDUPTHER

## 2020-02-13 RX ORDER — METHYLPREDNISOLONE ACETATE 40 MG/ML
20 INJECTION, SUSPENSION INTRA-ARTICULAR; INTRALESIONAL; INTRAMUSCULAR; SOFT TISSUE
Status: CANCELLED | OUTPATIENT
Start: 2020-02-13 | End: 2020-02-13

## 2020-02-13 RX ORDER — DEXAMETHASONE SODIUM PHOSPHATE 4 MG/ML
4 INJECTION, SOLUTION INTRA-ARTICULAR; INTRALESIONAL; INTRAMUSCULAR; INTRAVENOUS; SOFT TISSUE
Status: CANCELLED | OUTPATIENT
Start: 2020-02-13 | End: 2020-02-13

## 2020-02-13 RX ORDER — DEXAMETHASONE SODIUM PHOSPHATE 4 MG/ML
4 INJECTION, SOLUTION INTRA-ARTICULAR; INTRALESIONAL; INTRAMUSCULAR; INTRAVENOUS; SOFT TISSUE
Status: COMPLETED | OUTPATIENT
Start: 2020-02-13 | End: 2020-02-13

## 2020-02-13 RX ORDER — BUPIVACAINE HYDROCHLORIDE 5 MG/ML
1.5 INJECTION, SOLUTION PERINEURAL
Status: CANCELLED | OUTPATIENT
Start: 2020-02-13 | End: 2020-02-13

## 2020-02-13 RX ORDER — BUPIVACAINE HYDROCHLORIDE 5 MG/ML
1.5 INJECTION, SOLUTION PERINEURAL
Status: COMPLETED | OUTPATIENT
Start: 2020-02-13 | End: 2020-02-13

## 2020-02-13 RX ORDER — METHYLPREDNISOLONE ACETATE 40 MG/ML
20 INJECTION, SUSPENSION INTRA-ARTICULAR; INTRALESIONAL; INTRAMUSCULAR; SOFT TISSUE
Status: COMPLETED | OUTPATIENT
Start: 2020-02-13 | End: 2020-02-13

## 2020-02-13 RX ADMIN — DEXAMETHASONE SODIUM PHOSPHATE 4 MG: 4 INJECTION, SOLUTION INTRA-ARTICULAR; INTRALESIONAL; INTRAMUSCULAR; INTRAVENOUS; SOFT TISSUE at 04:02

## 2020-02-13 RX ADMIN — METHYLPREDNISOLONE ACETATE 20 MG: 40 INJECTION, SUSPENSION INTRA-ARTICULAR; INTRALESIONAL; INTRAMUSCULAR; SOFT TISSUE at 04:02

## 2020-02-13 RX ADMIN — BUPIVACAINE HYDROCHLORIDE 7.5 MG: 5 INJECTION, SOLUTION PERINEURAL at 04:02

## 2020-02-13 NOTE — PROGRESS NOTES
[address]  [place] [address]  Phone: [phone]   [phone]    Patient's PCP:Tanvir Cisneros III, MD  Referring Provider: No ref. provider found    Subjective:      Chief Complaint:: Heel Pain (left)    EB Zamora is a 60 y.o. female who presents with a follow up on plantar fascitis left foot. I have been doing level one treatment.Wearing running shoes with otc . I was taking Ibuprofen 800 mg but not every day. I am out now. I had an injection in June. Pain scale 10/10. It bothers me first thing in the morning and after rest. It feels really swollen and like the bone is coming through the heel.       Vitals:    02/13/20 1525   BP: 136/77   Pulse: 80     Shoe Size: 8.5-9    Past Surgical History:   Procedure Laterality Date    HYSTERECTOMY  1999    Ileocolectomy  2000        KNEE ARTHROSCOPY W/ DEBRIDEMENT      x 2, right    LUMBAR EPIDURAL INJECTION      TONSILLECTOMY      WRIST SURGERY Right     plate     Past Medical History:   Diagnosis Date    Back pain     Degenerative disc disease     GERD (gastroesophageal reflux disease)     Heel spur     Hypertension     Hypertrophy of both inferior nasal turbinates     Nasal septal deviation     Psoriasis     Sciatica      Family History   Problem Relation Age of Onset    Hypertension Father     Cancer Father     Hypertension Mother     Heart disease Mother     Diabetes Brother     Hypertension Brother     Prostate cancer Brother     Diabetes Sister     Hypertension Sister     Ovarian cancer Neg Hx     Breast cancer Neg Hx         Social History:   Marital Status:   Alcohol History:  reports that she drinks alcohol.  Tobacco History:  reports that she quit smoking about 2 years ago. She has a 5.50 pack-year smoking history. She has never used smokeless tobacco.  Drug History:  reports that she does not use drugs.    Review of patient's allergies indicates:   Allergen Reactions     Statins-hmg-coa reductase inhibitors Other (See Comments)     Muscle and joint pain      Naproxen Itching    Penicillins Itching     Pt states can take Keflex    Pineapple     Sulfa (sulfonamide antibiotics) Itching       Current Outpatient Medications   Medication Sig Dispense Refill    ALPRAZolam (XANAX) 0.5 MG tablet Take 1 tablet (0.5 mg total) by mouth 2 (two) times daily as needed. 60 tablet 2    aspirin (ECOTRIN) 81 MG EC tablet Take 81 mg by mouth once daily.      butalbital-acetaminophen-caffeine -40 mg (FIORICET, ESGIC) -40 mg per tablet Take 1 tablet by mouth every 6 (six) hours as needed.  0    doxycycline (VIBRAMYCIN) 100 MG Cap Take 1 capsule (100 mg total) by mouth every 12 (twelve) hours. 20 capsule 0    estradiol (ESTRACE) 1 MG tablet Take 1 mg by mouth once daily.       fluticasone (FLONASE) 50 mcg/actuation nasal spray 1 spray by Each Nare route once daily.      ibuprofen (ADVIL,MOTRIN) 800 MG tablet TAKE 1 TABLET BY MOUTH THREE TIMES DAILY( EVERY EIGHT HOURS) 60 tablet 0    levocetirizine (XYZAL) 5 MG tablet Take 5 mg by mouth every evening.      metoprolol tartrate (LOPRESSOR) 25 MG tablet TAKE 1 TABLET BY MOUTH EVERY 12 HOURS 180 tablet 1    pantoprazole (PROTONIX) 40 MG tablet Take 40 mg by mouth once daily.      polyethylene glycol (GLYCOLAX) 17 gram/dose powder TK 17 GRAMS QAM  0    spironolactone (ALDACTONE) 25 MG tablet Take 25 mg by mouth every other day.       No current facility-administered medications for this visit.        Review of Systems      Objective:        Physical Exam:   Foot Exam    General  General Appearance: appears stated age and healthy   Orientation: alert and oriented to person, place, and time   Affect: appropriate   Gait: antalgic       Left Foot/Ankle      Inspection and Palpation  Ecchymosis: none  Tenderness: plantar fascia and calcaneus tenderness   Swelling: none   Arch: normal  Hallux valgus: yes (Mild deformity)  Skin Exam:  skin intact;     Neurovascular  Dorsalis pedis: 2+  Posterior tibial: 2+  Saphenous nerve sensation: normal  Tibial nerve sensation: normal  Superficial peroneal nerve sensation: normal  Deep peroneal nerve sensation: normal  Sural nerve sensation: normal    Muscle Strength  Ankle dorsiflexion: 5  Ankle plantar flexion: 5  Ankle inversion: 5  Ankle eversion: 5  Great toe extension: 5  Great toe flexion: 5    Comments  Pain on palpation of the central plantar heel. No pain present  with side to side compression of the calcaneus. Negative tinnel's sign  at the tarsal tunnel. Negative Ward's nerve pain. Negative Calcaneal nerve pain. No soft tissue masses. Pain present with dorsiflexion of the ankle. No edema, erythema, or ecchymosis noted.     Physical Exam   Cardiovascular:   Pulses:       Dorsalis pedis pulses are 2+ on the left side.        Posterior tibial pulses are 2+ on the left side.       Imaging:            Assessment:       1. Plantar fasciitis - Left Foot    2. Left foot pain      Plan:   Plantar fasciitis - Left Foot  -     CROSS  FOR HOME USE  -     ibuprofen (ADVIL,MOTRIN) 800 MG tablet; TAKE 1 TABLET BY MOUTH THREE TIMES DAILY( EVERY EIGHT HOURS)  Dispense: 60 tablet; Refill: 0    Left foot pain  -     X-Ray Foot Complete Left  -     CROSS  FOR HOME USE  -     ibuprofen (ADVIL,MOTRIN) 800 MG tablet; TAKE 1 TABLET BY MOUTH THREE TIMES DAILY( EVERY EIGHT HOURS)  Dispense: 60 tablet; Refill: 0    Other orders  -     methylPREDNISolone acetate injection 20 mg  -     bupivacaine injection 7.5 mg  -     dexamethasone injection 4 mg    Plantar Fasciitis Level II Treatment:   Continue the conservative treatment of Level I, which is:   Anti-inflammatory  No bare feet  Over the counter insert  Restrict activity level   Use running shoes at full time  No impact exercise and stretch gastrocnemius muscle    Steroid Injection:  1 and 0.5 cc of 0.5% plain Marcaine, 1 cc of Decadron phosphate 4 mg, 0.5  cc of methylprednisolone 20 mg 2 inferior plantar fascia left calcaneus.  Patient tolerated procedures well she return as needed.    No follow-ups on file.    Procedures - None    Counseling:     I provided patient education verbally regarding:   Patient diagnosis, treatment options, as well as alternatives, risks, and benefits.     This note was created using Dragon voice recognition software that occasionally misinterpreted phrases or words.

## 2020-03-04 ENCOUNTER — OFFICE VISIT (OUTPATIENT)
Dept: NEUROLOGY | Facility: CLINIC | Age: 60
End: 2020-03-04
Payer: COMMERCIAL

## 2020-03-04 VITALS
HEART RATE: 70 BPM | RESPIRATION RATE: 17 BRPM | HEIGHT: 63 IN | BODY MASS INDEX: 35.53 KG/M2 | DIASTOLIC BLOOD PRESSURE: 75 MMHG | SYSTOLIC BLOOD PRESSURE: 144 MMHG | WEIGHT: 200.5 LBS

## 2020-03-04 DIAGNOSIS — G43.019 COMMON MIGRAINE WITH INTRACTABLE MIGRAINE: ICD-10-CM

## 2020-03-04 DIAGNOSIS — G44.89 CHRONIC MIXED HEADACHE SYNDROME: ICD-10-CM

## 2020-03-04 DIAGNOSIS — G44.85 PRIMARY STABBING HEADACHE: ICD-10-CM

## 2020-03-04 DIAGNOSIS — L40.50 PSORIATIC ARTHROPATHY: ICD-10-CM

## 2020-03-04 PROCEDURE — 3077F SYST BP >= 140 MM HG: CPT | Mod: CPTII,S$GLB,, | Performed by: PSYCHIATRY & NEUROLOGY

## 2020-03-04 PROCEDURE — 99205 OFFICE O/P NEW HI 60 MIN: CPT | Mod: S$GLB,,, | Performed by: PSYCHIATRY & NEUROLOGY

## 2020-03-04 PROCEDURE — 99999 PR PBB SHADOW E&M-EST. PATIENT-LVL IV: CPT | Mod: PBBFAC,,, | Performed by: PSYCHIATRY & NEUROLOGY

## 2020-03-04 PROCEDURE — 3008F PR BODY MASS INDEX (BMI) DOCUMENTED: ICD-10-PCS | Mod: CPTII,S$GLB,, | Performed by: PSYCHIATRY & NEUROLOGY

## 2020-03-04 PROCEDURE — 3078F PR MOST RECENT DIASTOLIC BLOOD PRESSURE < 80 MM HG: ICD-10-PCS | Mod: CPTII,S$GLB,, | Performed by: PSYCHIATRY & NEUROLOGY

## 2020-03-04 PROCEDURE — 3077F PR MOST RECENT SYSTOLIC BLOOD PRESSURE >= 140 MM HG: ICD-10-PCS | Mod: CPTII,S$GLB,, | Performed by: PSYCHIATRY & NEUROLOGY

## 2020-03-04 PROCEDURE — 99999 PR PBB SHADOW E&M-EST. PATIENT-LVL IV: ICD-10-PCS | Mod: PBBFAC,,, | Performed by: PSYCHIATRY & NEUROLOGY

## 2020-03-04 PROCEDURE — 99205 PR OFFICE/OUTPT VISIT, NEW, LEVL V, 60-74 MIN: ICD-10-PCS | Mod: S$GLB,,, | Performed by: PSYCHIATRY & NEUROLOGY

## 2020-03-04 PROCEDURE — 3008F BODY MASS INDEX DOCD: CPT | Mod: CPTII,S$GLB,, | Performed by: PSYCHIATRY & NEUROLOGY

## 2020-03-04 PROCEDURE — 3078F DIAST BP <80 MM HG: CPT | Mod: CPTII,S$GLB,, | Performed by: PSYCHIATRY & NEUROLOGY

## 2020-03-04 RX ORDER — LANOLIN ALCOHOL/MO/W.PET/CERES
400 CREAM (GRAM) TOPICAL 2 TIMES DAILY
Qty: 60 TABLET | Refills: 12 | Status: SHIPPED | OUTPATIENT
Start: 2020-03-04 | End: 2021-03-15

## 2020-03-04 RX ORDER — SUMATRIPTAN 50 MG/1
TABLET, FILM COATED ORAL
Qty: 9 TABLET | Refills: 5 | Status: SHIPPED | OUTPATIENT
Start: 2020-03-04 | End: 2022-05-11

## 2020-03-04 NOTE — LETTER
March 5, 2020                     [facility]  [address]  [place] [address]  Phone: [phone]  Fax: [phone]   Patient: Paula Zamora   MR Number: [id number]   YOB: 1960   Date of Visit: 3/4/2020       Dear Dr. Peng Recipients:    Thank you for referring Paula Zamora to me for evaluation. Below are the relevant portions of my assessment and plan of care.            If you have questions, please do not hesitate to call me. I look forward to following Paula along with you.    Sincerely,      Caroline Shine MD           CC  No Recipients

## 2020-03-04 NOTE — PROGRESS NOTES
Headache questionnaire    1. When did your Headaches start?    Years       2. Where are your headaches located?   Top left to right head and behind my ear      3. Your headache's characteristics:   Excruciating, Pressure, Throbbing, Pounding, Stabbing, Like a tight band, Sharp      4. How long does the headache last?   Days       5. How often does the headache occur?   other      6. Are your headaches preceded or accompanied by other symptoms? yes   If yes, please describe.  Facial pain, very tender scalp       7. Does the headache awaken you at night? yes   If so, how often? The pain makes me squinch         8. Please jules the word that best describes your headache's intensity:    severe      9. Using a scale of 1 through 10, with 0 = no pain and 10 = the worst pain:   What score is your headache now? 0   What score is your headache at its worst? 10   What score is your headache at its best? 0        10. Possible associated headache symptoms:  []  Sensitivity to light  [x] Dizziness  [] Nasal or sinus pressure/ pain   [x] Sensitivity to noise  [] Vertigo  [] Problems with concentration  [] Sensitivity to smells  [] Ringing in ears  [] Problems with memory    [] Blurred vision  [x] Irritability  [x] Problems with task completion   [] Double vision  [] Anger  [x]  Problems with relaxation  [] Loss of appetite  [x] Anxiety  [] Neck tightness, Neck pain  [] Nausea   [] Nasal congestion  [] Vomiting         11. Headache improving factors:  [] Sleep  [] Heat  [] Darkness  [] Ice  [] Local pressure [] Menses (period)  [] Massage   [x] Medications:        12. Headache worsening factors:   [] Fatigue [x] Sneezing  [x] Changes in Weather  [] Light [x] Bending Over [x] Stress  [x] Noise [] Ovulation  [] Multiple Sclerosis Flare-Up  [] Smells  [] Menses  [] Food   [] Coughing [] Alcohol      13. Number of caffeinated drinks per day: 3      14. Number of diet drinks per day:  0      15. Have you seen any other [facility] within the last 3 years?  No    Please Check any Medications or Procedures tried/failed for Headache    AED Neuromodulators  MAOIs  Ergot Alkaloids    Acetazolamide (Diamox) [] Phenelzine (Nardil) [] Dihydroergotamine (Migranal) []   Carbamazepine (Tegretol) [] Tranylcypromine (Parnate) [] Ergotamine (Ergomar) []   Gabapentin (Neurontin) [] Antihistamine/Serotonergic  Triptans    Lacosamide (Vimpat) [] Cyproheptadine (Periactin) [] Almotriptan (Axert) []   Lamotrigine (Lamictal) [] Antihypertensives  Eletriptan (Relpax) []   Levatiracetam (Keppra) [] Atenolol (Tenormin) [] Frovatriptan (Frova) []   Oxcarbazepine (Trileptal) [] Bisoprostol (Zebeta) [] Naratriptan (Amerge) []   Phenobarbital [] Candesartan (Atacand) [] Rizatriptan (Maxalt) []     Nebivolol (Bystolic)  Sumatriptan (Imitrex) []   Levetiracetam (Keppra)  Cardeilol (Coreg) [] Zolmitriptan (Zomig) []   Phenytoin (Dilantin) [] Diltiazem (Cardizem) []     Pregabalin (Lyrica) [] Lisinopril (Prinivil, Zestril) [] Combo Abortives    Primidone (Mysoline) [] Metoprolol (Toprol) [] BC Powder []   Tiagabine (Gabatril) [] Nadolol (Corgard) [] Butalbital and Acetaminophen (Bupap) []   Topiramate (Topamax)  (Trokendi) [] Nicardipine (Cardene) []     Vigabatrin (Sabril) [] Nimodipine (Nimotop) [] Butalbital, Acetaminophen, and caffeine (Fioricet) [x]   Valproic Acid (Depakote) (Divalproex Sodium) [] Propranolol (Inderal) []     Zonisamide (Zonegran) [] Telmisartan (Micardis) [] Butalbital, Aspirin, and caffeine (Fiorinal) []   Benzodiazepines  Timolol (Blocadren) []     Alprazolam (Xanax) [] Verapamil (Calan, Verelan) [] Butalbital, Caffeine, Acetaminophen, and Codeine (Fioricet with Codeine) []   Diazepam (Valium) [] NSAIDs      Lorazepam (Ativan) [] Acetaminophen (Tylenol) [x]     Clonazepam (Klonopin) [] Acetylsalicylic Acid (Aspirin) [] Butalbital, Caffeine, Aspirin, and Codeine  (Fiorinal with Codeine) []   Antidepressants  Diclofenac (Cambia) []      Amitriptyline (Elavil) [] Ibuprofen (Motrin) [x]     Desipramine (Norpramin) [] Indomethacin (Indocin) [] Aspirin, Caffeine, and Acetaminophen (Excedrin) (Goodys) []   Doxepin (Sinequan) [] Ketoprofen (Orudis) []     Fluoxetine (Prozac) [] Ketorolac (Toradol) [] Acetaminophen, Dichloralphenazone, and Isometheptene (Midrin) []   Imipramine (Tofranil) [] Naproxen (Anaprox) (Aleve) []     Nortriptyline (Pamelor) [] Meclofenamic Acid (Meclomen) []     Venlafaxine (Effexor) [] Meloxicam (Mobic) [] Procedures    Desvenlafazine (Pristiq) [] Monoclonals  Greater occipital nerve block []   Duloxetine (Cymbalta) [] Eptinezumab [] Cervical, Thoracic, Lumbar radiofrequency ablation []   Trazadone [] Erenumab-aooe (Aimovig) [] Spenopalatine ganglion block []   Wellbutrin [] Galcanezumab (Emgality) [] Occipital neuro stimulation []   Protriptyline (Vivactil) [] Fremanazumab-vfrm (Ajovy)  Cervical, Thoracic, Lumbar, Caudal Epidural steroid injection []   Escitalopram (Lexapro) [] Other [] Sacroiliac joint steroid injection []   Celexa [] Memantine (Namenda) [] Transforaminal epidural steroid injection []     Botox [] Facet joint injections []     Baclofen (Lioresal) [] Cervical, Thoracic, Lumbar medial branch blocks []       Cefaly []       Gamma Core []       Iovera []       Transcranial Magnetic stimulation []

## 2020-03-04 NOTE — MEDICAL/APP STUDENT
Subjective:       Patient ID: Paula Zamora is a 60 y.o. female.    Chief Complaint: Headache    Ms. Zamora is a 60 year old woman with a history chronic stress, anxiety, and allydonia who presented to clinic with a chronic intermittent primary migraine.    Subjective  Patient describes that her pain is bilateral and stabbing in nature with tension over the frontal and temporal areas of her head. Patient reports that she first noticed her headaches after she had her first child. Her headaches are worst when bending down and only relieved in the supine position, away from noise and direct light. Her headaches are associated with blurry vision, sharp facial pain, and allydonia of her entire face and scalp. Patient reports that her headaches are triggered by weather changes, variability in air pressure, changes in elevation, high stress and anxiety, pollen, sinus infections, direct sunlight. Patient mentions that her water intake is minimal (less than 0.5 L per day) and does not drink more than two cups of coffee or tea per day. Patient denies loss of consciousness, cluster headaches, other vision changes, shortness of breath chest pain, and tachycardia.    Past Medical History:  No date: Back pain  No date: Degenerative disc disease  No date: GERD (gastroesophageal reflux disease)  No date: Heel spur  No date: Hypertension  No date: Hypertrophy of both inferior nasal turbinates  No date: Nasal septal deviation  No date: Psoriasis  No date: Sciatica      PSH  Past Surgical History:  1999: HYSTERECTOMY  2000: Ileocolectomy      Comment:    No date: KNEE ARTHROSCOPY W/ DEBRIDEMENT      Comment:  x 2, right  No date: LUMBAR EPIDURAL INJECTION  No date: TONSILLECTOMY  No date: WRIST SURGERY; Right      Comment:  plate    Current Outpatient Medications on File Prior to Visit:  ALPRAZolam (XANAX) 0.5 MG tablet, Take 1 tablet (0.5 mg total) by mouth 2 (two) times daily as needed., Disp: 60 tablet, Rfl:  2  aspirin (ECOTRIN) 81 MG EC tablet, Take 81 mg by mouth once daily., Disp: , Rfl:   butalbital-acetaminophen-caffeine -40 mg (FIORICET, ESGIC) -40 mg per tablet, Take 1 tablet by mouth every 6 (six) hours as needed., Disp: , Rfl: 0  doxycycline (VIBRAMYCIN) 100 MG Cap, Take 1 capsule (100 mg total) by mouth every 12 (twelve) hours., Disp: 20 capsule, Rfl: 0  estradiol (ESTRACE) 1 MG tablet, Take 1 mg by mouth once daily. , Disp: , Rfl:   fluticasone (FLONASE) 50 mcg/actuation nasal spray, 1 spray by Each Nare route once daily., Disp: , Rfl:   ibuprofen (ADVIL,MOTRIN) 800 MG tablet, TAKE 1 TABLET BY MOUTH THREE TIMES DAILY( EVERY EIGHT HOURS), Disp: 60 tablet, Rfl: 0  levocetirizine (XYZAL) 5 MG tablet, Take 5 mg by mouth every evening., Disp: , Rfl:   metoprolol tartrate (LOPRESSOR) 25 MG tablet, TAKE 1 TABLET BY MOUTH EVERY 12 HOURS, Disp: 180 tablet, Rfl: 1  pantoprazole (PROTONIX) 40 MG tablet, Take 40 mg by mouth once daily., Disp: , Rfl:   polyethylene glycol (GLYCOLAX) 17 gram/dose powder, TK 17 GRAMS QAM, Disp: , Rfl: 0  spironolactone (ALDACTONE) 25 MG tablet, Take 25 mg by mouth every other day., Disp: , Rfl:     No current facility-administered medications on file prior to visit.       Family History  Daughter: Migraines    Social History  Marital status:   Sexual activity: Yes  Partners: Male          [facility]  Smoking status: Former Smoker  Packs/day: 0.25  Years: 22.00  Pack years: 5.5  Quit date: 3/1/2017  Years since quitting: 3.0  Smokeless tobacco: Never Used  Alcohol use: Yes, socially  Drug use: No    Allergies  Statins-hmg-coa Reductase Inhibitors  NaproxenItching  PenicillinsItching  Pineapple  Sulfa (Sulfonamide Antibiotics): Itching    Objective    Physical Exam    Vitals:    03/04/20 0842   BP: (!) 144/75   Pulse: 70   Resp: 17       Physical Exam   Constitutional: She is oriented to person, place, and time.   Eyes: Right eye exhibits normal extraocular motion and no  nystagmus. Left eye exhibits normal extraocular motion and no nystagmus. Right pupil is round and reactive. Left pupil is round and reactive. Pupils are equal.   Fundoscopic exam:       The right eye shows no exudate, no hemorrhage and no papilledema. The right eye shows red reflex.        The left eye shows no exudate, no hemorrhage and no papilledema. The left eye shows red reflex.   Visual Fields:       Right eye: Counts fingers in the upper temporal quadrant. Counts fingers in the upper nasal quadrant. Counts fingers in the lower temporal quadrant. Counts fingers in the lower nasal quadrant.        Left eye: Counts fingers in the upper temporal quadrant. Counts fingers in the upper nasal quadrant. Counts fingers in the lower temporal quadrant. Counts fingers in the lower nasal quadrant.   Cardiovascular: Normal rate, S1 normal and S2 normal.   Pulses:       Carotid pulses are 2+ on the right side, and 2+ on the left side.  Neurological: She is alert and oriented to person, place, and time. She has normal sensation, normal strength, normal reflexes and intact cranial nerves. She is not disoriented. She displays no weakness, no tremor, facial symmetry, normal stance and normal speech. No cranial nerve deficit or sensory deficit. She exhibits normal muscle tone. She has a normal Cerebellar Exam, a normal Finger-Nose-Finger Test, a normal Heel to Shin Test, a normal Romberg Test and a normal Tandem Gait Test. She shows no pronator drift. Gait normal. Coordination and gait normal. GCS score is 15. She displays no Babinski's sign on the right side. She displays no Babinski's sign on the left side.   Sharp and light touch intact bilaterally            Review of Systems    Objective:      Physical Exam   Constitutional: She is oriented to person, place, and time.   Eyes: Right eye exhibits normal extraocular motion and no nystagmus. Left eye exhibits normal extraocular motion and no nystagmus. Right pupil is round and  reactive. Left pupil is round and reactive. Pupils are equal.   Fundoscopic exam:       The right eye shows no exudate, no hemorrhage and no papilledema. The right eye shows red reflex.        The left eye shows no exudate, no hemorrhage and no papilledema. The left eye shows red reflex.   Visual Fields:       Right eye: Counts fingers in the upper temporal quadrant. Counts fingers in the upper nasal quadrant. Counts fingers in the lower temporal quadrant. Counts fingers in the lower nasal quadrant.        Left eye: Counts fingers in the upper temporal quadrant. Counts fingers in the upper nasal quadrant. Counts fingers in the lower temporal quadrant. Counts fingers in the lower nasal quadrant.   Cardiovascular: Normal rate, S1 normal and S2 normal.   Pulses:       Carotid pulses are 2+ on the right side, and 2+ on the left side.  Neurological: She is alert and oriented to person, place, and time. She has normal sensation, normal strength, normal reflexes and intact cranial nerves. She is not disoriented. She displays no weakness, no tremor, facial symmetry, normal stance and normal speech. No cranial nerve deficit or sensory deficit. She exhibits normal muscle tone. She has a normal Cerebellar Exam, a normal Finger-Nose-Finger Test, a normal Heel to Shin Test, a normal Romberg Test and a normal Tandem Gait Test. She shows no pronator drift. Gait normal. Coordination and gait normal. GCS score is 15. She displays no Babinski's sign on the right side. She displays no Babinski's sign on the left side.   Sharp and light touch intact bilaterally          Assessment:     Ms. Zamora is a 60 year old woman with a history chronic stress, anxiety, and allydonia presented to clinic with a chronic intermittent primary migraine.  1. Chronic mixed headache syndrome    2. Primary stabbing headache    3. Common migraine with intractable migraine    4. Psoriatic arthropathy          Labs    CBC     WBC 3.90 - 12.70 K/uL 4.79     RBC 4.00 - 5.40 M/uL 4.35    Hemoglobin 12.0 - 16.0 g/dL 12.5    Hematocrit 37.0 - 48.5 % 39.5    Mean Corpuscular Volume 82 - 98 fL 91    Mean Corpuscular Hemoglobin 27.0 - 31.0 pg 28.7    Mean Corpuscular Hemoglobin Conc 32.0 - 36.0 g/dL 31.6Low     RDW 11.5 - 14.5 % 15.1High     Platelets 150 - 350 K/uL 210      CMP    Sodium 136 - 145 mmol/L 143    Potassium 3.5 - 5.1 mmol/L 4.8    Chloride 95 - 110 mmol/L 107    CO2 23 - 29 mmol/L 30High     Glucose 70 - 110 mg/dL 101    BUN, Bld 8 - 23 mg/dL 19    Creatinine 0.5 - 1.4 mg/dL 0.9    Calcium 8.7 - 10.5 mg/dL 9.5    Total Protein 6.0 - 8.4 g/dL 7.4    Albumin 3.5 - 5.2 g/dL 4.2    Total Bilirubin 0.1 - 1.0 mg/dL 0.8     Other labs    10/31/2019  TSH w/reflex to FT4 0.40 - 4.50 mIU/L 0.88        Plan:     Ms. Zamora is a 60 year old woman with a history chronic stress, anxiety, and allydonia who presented to clinic with a chronic intermittent primary migraine.         Primary Stabbing Headache  - Order Brain MRI WO contrast  - Keep a daily migraine diary for 3 months, documenting pain and visual symptoms  - Magnesium Oxide 400 mg BID (241.3 grams Magnesium)  - Sumatripan (IMITREX) 50 mg tablet, 1 tablet at initial presentation of headache, repeat in two hours (No more than three tablet per 24 hours)

## 2020-03-04 NOTE — PROGRESS NOTES
Subjective:       Patient ID: Paula Zamora is a 60 y.o. female.    Chief Complaint: Headache    HPI     Information obtained by medical student and personally corroborated.    Ms. Zamora is a 60 year old woman with a history chronic stress, anxiety, and allydonia who presented to clinic with a chronic intermittent primary migraine.     Subjective  Patient describes that her pain is bilateral and stabbing in nature with tension over the frontal and temporal areas of her head. Patient reports that she first noticed her headaches after she had her first child. Her headaches are worst when bending down and only relieved in the supine position, away from noise and direct light. Her headaches are associated with blurry vision, sharp facial pain, and allydonia of her entire face and scalp. Patient reports that her headaches are triggered by weather changes, variability in air pressure, changes in elevation, high stress and anxiety, pollen, sinus infections, direct sunlight. Patient mentions that her water intake is minimal (less than 0.5 L per day) and does not drink more than two cups of coffee or tea per day. Patient denies loss of consciousness, cluster headaches, other vision changes, shortness of breath chest pain, and tachycardia.     Headache questionnaire    1. When did your Headaches start?    Years       2. Where are your headaches located?   Top left to right head and behind my ear      3. Your headache's characteristics:   Excruciating, Pressure, Throbbing, Pounding, Stabbing, Like a tight band, Sharp      4. How long does the headache last?   Days       5. How often does the headache occur?   other      6. Are your headaches preceded or accompanied by other symptoms? yes   If yes, please describe.  Facial pain, very tender scalp       7. Does the headache awaken you at night? yes   If so, how often? The pain makes me squinch         8. Please jules the word that best describes your headache's  intensity:    severe      9. Using a scale of 1 through 10, with 0 = no pain and 10 = the worst pain:   What score is your headache now? 0   What score is your headache at its worst? 10   What score is your headache at its best? 0        10. Possible associated headache symptoms:  []  Sensitivity to light  [x] Dizziness  [] Nasal or sinus pressure/ pain   [x] Sensitivity to noise  [] Vertigo  [] Problems with concentration  [] Sensitivity to smells  [] Ringing in ears  [] Problems with memory    [] Blurred vision  [x] Irritability  [x] Problems with task completion   [] Double vision  [] Anger  [x]  Problems with relaxation  [] Loss of appetite  [x] Anxiety  [] Neck tightness, Neck pain  [] Nausea   [] Nasal congestion  [] Vomiting         11. Headache improving factors:  [] Sleep  [] Heat  [] Darkness  [] Ice  [] Local pressure [] Menses (period)  [] Massage   [x] Medications:        12. Headache worsening factors:   [] Fatigue [x] Sneezing  [x] Changes in Weather  [] Light [x] Bending Over [x] Stress  [x] Noise [] Ovulation  [] Multiple Sclerosis Flare-Up  [] Smells  [] Menses  [] Food   [] Coughing [] Alcohol      13. Number of caffeinated drinks per day: 3      14. Number of diet drinks per day:  0      Please Check any Medications or Procedures tried/failed for Headache    AED Neuromodulators  MAOIs  Ergot Alkaloids    Acetazolamide (Diamox) [] Phenelzine (Nardil) [] Dihydroergotamine (Migranal) []   Carbamazepine (Tegretol) [] Tranylcypromine (Parnate) [] Ergotamine (Ergomar) []   Gabapentin (Neurontin) [] Antihistamine/Serotonergic  Triptans    Lacosamide (Vimpat) [] Cyproheptadine (Periactin) [] Almotriptan (Axert) []   Lamotrigine (Lamictal) [] Antihypertensives  Eletriptan (Relpax) []   Levatiracetam (Keppra) [] Atenolol (Tenormin) [] Frovatriptan (Frova) []   Oxcarbazepine (Trileptal) [] Bisoprostol (Zebeta) [] Naratriptan (Amerge) []   Phenobarbital [] Candesartan (Atacand) [] Rizatriptan (Maxalt) []      Nebivolol (Bystolic)  Sumatriptan (Imitrex) []   Levetiracetam (Keppra)  Cardeilol (Coreg) [] Zolmitriptan (Zomig) []   Phenytoin (Dilantin) [] Diltiazem (Cardizem) []     Pregabalin (Lyrica) [] Lisinopril (Prinivil, Zestril) [] Combo Abortives    Primidone (Mysoline) [] Metoprolol (Toprol) [] BC Powder []   Tiagabine (Gabatril) [] Nadolol (Corgard) [] Butalbital and Acetaminophen (Bupap) []   Topiramate (Topamax)  (Trokendi) [] Nicardipine (Cardene) []     Vigabatrin (Sabril) [] Nimodipine (Nimotop) [] Butalbital, Acetaminophen, and caffeine (Fioricet) [x]   Valproic Acid (Depakote) (Divalproex Sodium) [] Propranolol (Inderal) []     Zonisamide (Zonegran) [] Telmisartan (Micardis) [] Butalbital, Aspirin, and caffeine (Fiorinal) []   Benzodiazepines  Timolol (Blocadren) []     Alprazolam (Xanax) [] Verapamil (Calan, Verelan) [] Butalbital, Caffeine, Acetaminophen, and Codeine (Fioricet with Codeine) []   Diazepam (Valium) [] NSAIDs      Lorazepam (Ativan) [] Acetaminophen (Tylenol) [x]     Clonazepam (Klonopin) [] Acetylsalicylic Acid (Aspirin) [] Butalbital, Caffeine, Aspirin, and Codeine  (Fiorinal with Codeine) []   Antidepressants  Diclofenac (Cambia) []     Amitriptyline (Elavil) [] Ibuprofen (Motrin) [x]     Desipramine (Norpramin) [] Indomethacin (Indocin) [] Aspirin, Caffeine, and Acetaminophen (Excedrin) (Goodys) []   Doxepin (Sinequan) [] Ketoprofen (Orudis) []     Fluoxetine (Prozac) [] Ketorolac (Toradol) [] Acetaminophen, Dichloralphenazone, and Isometheptene (Midrin) []   Imipramine (Tofranil) [] Naproxen (Anaprox) (Aleve) []     Nortriptyline (Pamelor) [] Meclofenamic Acid (Meclomen) []     Venlafaxine (Effexor) [] Meloxicam (Mobic) [] Procedures    Desvenlafazine (Pristiq) [] Monoclonals  Greater occipital nerve block []   Duloxetine (Cymbalta) [] Eptinezumab [] Cervical, Thoracic, Lumbar radiofrequency ablation []   Trazadone [] Erenumab-aooe (Aimovig) [] Spenopalatine ganglion block []    Wellbutrin [] Galcanezumab (Emgality) [] Occipital neuro stimulation []   Protriptyline (Vivactil) [] Fremanazumab-vfrm (Ajovy)  Cervical, Thoracic, Lumbar, Caudal Epidural steroid injection []   Escitalopram (Lexapro) [] Other [] Sacroiliac joint steroid injection []   Celexa [] Memantine (Namenda) [] Transforaminal epidural steroid injection []     Botox [] Facet joint injections []     Baclofen (Lioresal) [] Cervical, Thoracic, Lumbar medial branch blocks []       Cefaly []       Gamma Core []       Iovera []       Transcranial Magnetic stimulation []                        Review of Systems   Constitutional: Negative for activity change, appetite change, fatigue and fever.   HENT: Negative for congestion, dental problem, hearing loss, sinus pressure, tinnitus, trouble swallowing and voice change.    Eyes: Negative for photophobia, pain, redness and visual disturbance.   Respiratory: Negative for cough, chest tightness and shortness of breath.    Cardiovascular: Negative for chest pain, palpitations and leg swelling.   Gastrointestinal: Negative for abdominal pain, blood in stool, nausea and vomiting.   Endocrine: Negative for cold intolerance and heat intolerance.   Genitourinary: Negative for difficulty urinating, frequency, menstrual problem and urgency.   Musculoskeletal: Positive for arthralgias. Negative for back pain, gait problem, joint swelling, myalgias, neck pain and neck stiffness.   Skin: Negative.    Neurological: Positive for headaches. Negative for dizziness, tremors, seizures, syncope, facial asymmetry, speech difficulty, weakness, light-headedness and numbness.   Hematological: Negative for adenopathy. Does not bruise/bleed easily.   Psychiatric/Behavioral: Negative for agitation, behavioral problems, confusion, decreased concentration, self-injury, sleep disturbance and suicidal ideas. The patient is not nervous/anxious and is not hyperactive.            Past Medical History:   Diagnosis Date     Back pain     Degenerative disc disease     GERD (gastroesophageal reflux disease)     Heel spur     Hypertension     Hypertrophy of both inferior nasal turbinates     Nasal septal deviation     Psoriasis     Sciatica      Past Surgical History:   Procedure Laterality Date    HYSTERECTOMY  1999    Ileocolectomy  2000        KNEE ARTHROSCOPY W/ DEBRIDEMENT      x 2, right    LUMBAR EPIDURAL INJECTION      TONSILLECTOMY      WRIST SURGERY Right     plate     Family History   Problem Relation Age of Onset    Hypertension Father     Cancer Father     Hypertension Mother     Heart disease Mother     Diabetes Brother     Hypertension Brother     Prostate cancer Brother     Diabetes Sister     Hypertension Sister     Ovarian cancer Neg Hx     Breast cancer Neg Hx      Social History     Socioeconomic History    Marital status:      Spouse name: Not on file    Number of children: Not on file    Years of education: Not on file    Highest education level: Not on file   Occupational History    Not on file   Social Needs    Financial resource strain: Not on file    Food insecurity:     Worry: Not on file     Inability: Not on file    Transportation needs:     Medical: Not on file     Non-medical: Not on file   Tobacco Use    Smoking status: Former Smoker     Packs/day: 0.25     Years: 22.00     Pack years: 5.50     Last attempt to quit: 3/1/2017     Years since quitting: 3.0    Smokeless tobacco: Never Used    Tobacco comment: less than 1 pack week   Substance and Sexual Activity    Alcohol use: Yes     Comment: socially    Drug use: No    Sexual activity: Yes     Partners: Male     Birth control/protection: None, Surgical   Lifestyle    Physical activity:     Days per week: Not on file     Minutes per session: Not on file    Stress: Not on file   Relationships    Social connections:     Talks on phone: Not on file     Gets together: Not on file     Attends Mandaeism  service: Not on file     Active member of club or organization: Not on file     Attends meetings of clubs or organizations: Not on file     Relationship status: Not on file   Other Topics Concern    Not on file   Social History Narrative    Not on file     Review of patient's allergies indicates:   Allergen Reactions    Statins-hmg-coa reductase inhibitors Other (See Comments)     Muscle and joint pain      Naproxen Itching    Penicillins Itching     Pt states can take Keflex    Pineapple     Sulfa (sulfonamide antibiotics) Itching       Current Outpatient Medications:     ALPRAZolam (XANAX) 0.5 MG tablet, Take 1 tablet (0.5 mg total) by mouth 2 (two) times daily as needed., Disp: 60 tablet, Rfl: 2    aspirin (ECOTRIN) 81 MG EC tablet, Take 81 mg by mouth once daily., Disp: , Rfl:     butalbital-acetaminophen-caffeine -40 mg (FIORICET, ESGIC) -40 mg per tablet, Take 1 tablet by mouth every 6 (six) hours as needed., Disp: , Rfl: 0    doxycycline (VIBRAMYCIN) 100 MG Cap, Take 1 capsule (100 mg total) by mouth every 12 (twelve) hours., Disp: 20 capsule, Rfl: 0    estradiol (ESTRACE) 1 MG tablet, Take 1 mg by mouth once daily. , Disp: , Rfl:     fluticasone (FLONASE) 50 mcg/actuation nasal spray, 1 spray by Each Nare route once daily., Disp: , Rfl:     ibuprofen (ADVIL,MOTRIN) 800 MG tablet, TAKE 1 TABLET BY MOUTH THREE TIMES DAILY( EVERY EIGHT HOURS), Disp: 60 tablet, Rfl: 0    levocetirizine (XYZAL) 5 MG tablet, Take 5 mg by mouth every evening., Disp: , Rfl:     metoprolol tartrate (LOPRESSOR) 25 MG tablet, TAKE 1 TABLET BY MOUTH EVERY 12 HOURS, Disp: 180 tablet, Rfl: 1    pantoprazole (PROTONIX) 40 MG tablet, Take 40 mg by mouth once daily., Disp: , Rfl:     polyethylene glycol (GLYCOLAX) 17 gram/dose powder, TK 17 GRAMS QAM, Disp: , Rfl: 0    spironolactone (ALDACTONE) 25 MG tablet, Take 25 mg by mouth every other day., Disp: , Rfl:     magnesium oxide (MAG-OX) 400 mg (241.3 mg  magnesium) tablet, Take 1 tablet (400 mg total) by mouth 2 (two) times daily., Disp: 60 tablet, Rfl: 12    sumatriptan (IMITREX) 50 MG tablet, Take 1 at onset of headache, may repeat in 2 hours to a max of 3 per day, 2 days per week, Disp: 9 tablet, Rfl: 5    SUMAtriptan succinate (ZEMBRACE SYMTOUCH) 3 mg/0.5 mL PnIj, Use 3 mg sc at onset of severe headacge, may repeat once after 1 hour. Max 2/day, Disp: 6 Syringe, Rfl: 5      Objective:      Vitals:    03/04/20 0842   BP: (!) 144/75   Pulse: 70   Resp: 17     Body mass index is 35.52 kg/m².    Physical Exam    Constitutional:   She appears well-developed and well-nourished. She is well groomed    HENT:    Head: Atraumatic, oral and nasal mucosa intact  Eyes: Conjunctivae and EOM are normal. Pupils are equal, round, and reactive to light OU  Neck: Neck supple. No thyromegaly present  Cardiovascular: Normal rate and normal heart sounds  No murmur heard  Pulmonary/Chest: Effort normal and breath sounds normal  Musculoskeletal: Normal range of motion. No joint stiffness. No vertebral point tenderness  Skin: Skin is warm and dry  Psychiatric: Normal mood and affect     Neuro exam:    Mental status:  Awake, attentive, Alert, oriented to self, place, year and month  Language function is intact    Cranial Nerves:  Smell was not formally evaluated  Cranial Nerves II - XII: intact  Pursuits were smooth, normal saccades, no nystagmus OU  Funduscopic exam - disc were flat and pink, no exudates or hemorrhages OU  Motor - facial movement was symmetrical and normal     Palate moved well and was symmetrical with normal palatal and oral sensation  Tongue movements were full    Coordination:     Rapid alternating movements and rapid finger tapping - normal bilaterally  Finger to nose - normal and symmetric bilaterally   Heel to shin test - normal and symmetric bilaterally   Arm roll - smooth and symmetric   No intentional or positional tremor.     Motor:  Normal muscle bulk and  symmetry. No fasciculations were noted    No pronator drift  Strength 5/5 bilaterally     Reflexes:  Tendon reflexes were 2 + at biceps, triceps, brachioradialis, patellar, and Achilles bilaterally  On plantar stimulation toes were down going bilaterally  No clonus was noted     Sensory: Intact to light touch, pin prick in all extremities. Vibration and proprioception intact in all extremities.     Gait: Romberg absent. Normal gait. Normal arm swing and turns. Normal postural reflexes.     Review of Data:  Lab Results   Component Value Date     01/17/2020     05/14/2019    K 4.5 01/17/2020     01/17/2020     05/14/2019    CO2 28 01/17/2020    BUN 18 01/17/2020    CREATININE 0.84 01/17/2020    CREATININE 0.91 05/14/2019    GLU 96 01/17/2020    GLU 98 05/14/2019    AST 16 01/17/2020    ALT 18 01/17/2020    ALBUMIN 4.1 01/17/2020    ALBUMIN 3.6 05/14/2019    PROT 6.3 01/17/2020    BILITOT 0.4 01/17/2020    CHOL 163 01/17/2020    HDL 70 01/17/2020    LDLCALC 78 01/17/2020    TRIG 69 01/17/2020       Lab Results   Component Value Date    WBC 5.0 01/17/2020    HGB 11.7 01/17/2020    HCT 35.6 01/17/2020    MCV 89.0 01/17/2020     01/17/2020           Assessment and Plan   Chronic mixed headache syndrome  Episodic Migraine Without aura  Primary Stabbing Headche  -     MRI Brain Without Contrast; Future; Expected date: 03/04/2020    Other orders  -     SUMAtriptan succinate (ZEMBRACE SYMTOUCH) 3 mg/0.5 mL PnIj; Use 3 mg sc at onset of severe headacge, may repeat once after 1 hour. Max 2/day  Dispense: 6 Syringe; Refill: 5  -     sumatriptan (IMITREX) 50 MG tablet; Take 1 at onset of headache, may repeat in 2 hours to a max of 3 per day, 2 days per week  Dispense: 9 tablet; Refill: 5  -     magnesium oxide (MAG-OX) 400 mg (241.3 mg magnesium) tablet; Take 1 tablet (400 mg total) by mouth 2 (two) times daily.  Dispense: 60 tablet; Refill: 12      I have discussed the side effects of the  medications prescribed and the patient acknowledges understanding    Counseling:  More than 50% of the 60 minute encounter was spent face to face counseling the patient regarding current status and future plan of care as well as side of the medications. All questions were answered to patient's satisfaction        Jaime Shine M.D  Medical Director, Headache and Facial Pain  [facility]

## 2020-03-04 NOTE — LETTER
March 5, 2020      No Recipients           [facility]  [address] [place] [address]  Phone: [phone]  Fax: [phone]          Patient: Paula Zamora   MR Number: [id number]   YOB: 1960   Date of Visit: 3/4/2020       Dear :    Thank you for referring Paula Zamora to me for evaluation. Attached you will find relevant portions of my assessment and plan of care.    If you have questions, please do not hesitate to call me. I look forward to following Paula Zamora along with you.    Sincerely,    Caroline Shnie MD    Enclosure  CC:  No Recipients    If you would like to receive this communication electronically, please contact [email or web address] or [phone] to request more information on [employer] access.    For providers and/or their staff who would like to refer a patient to [facility], please contact us through our one-stop-shop provider referral line, [place], at [phone].    If you feel you have received this communication in error or would no longer like to receive these types of communications, please e-mail [email or web address]

## 2020-03-05 ENCOUNTER — PATIENT MESSAGE (OUTPATIENT)
Dept: NEUROLOGY | Facility: CLINIC | Age: 60
End: 2020-03-05

## 2020-03-05 ENCOUNTER — TELEPHONE (OUTPATIENT)
Dept: NEUROLOGY | Facility: CLINIC | Age: 60
End: 2020-03-05

## 2020-03-05 NOTE — TELEPHONE ENCOUNTER
----- Message from Britney Morales sent at 3/4/2020  4:29 PM CST -----  Type: Needs Medical Advice    Who Called:  Pt   Pharmacy name and phone #:    [id number] - [place] [id number] [facility] AT [facility] [address] [place] [address] [facility] [address]  Phone: [phone] Fax: [phone]  Best Call Back Number: [phone]  Additional Information: states that SUMAtriptan succinate (ZEMBRACE SYMTOUCH) 3 mg/0.5 mL PnIj is not covered by her insurance. Please give call back.

## 2020-03-10 ENCOUNTER — HOSPITAL ENCOUNTER (OUTPATIENT)
Dept: RADIOLOGY | Facility: HOSPITAL | Age: 60
Discharge: HOME | End: 2020-03-10
Attending: PSYCHIATRY & NEUROLOGY
Payer: COMMERCIAL

## 2020-03-10 DIAGNOSIS — G44.89 CHRONIC MIXED HEADACHE SYNDROME: ICD-10-CM

## 2020-03-10 PROCEDURE — 70551 MRI BRAIN STEM W/O DYE: CPT | Mod: 26,,, | Performed by: RADIOLOGY

## 2020-03-10 PROCEDURE — 70551 MRI BRAIN STEM W/O DYE: CPT | Mod: TC

## 2020-03-10 PROCEDURE — 70551 MRI BRAIN WITHOUT CONTRAST: ICD-10-PCS | Mod: 26,,, | Performed by: RADIOLOGY

## 2020-03-19 ENCOUNTER — OFFICE VISIT (OUTPATIENT)
Dept: PODIATRY | Facility: CLINIC | Age: 60
End: 2020-03-19
Payer: COMMERCIAL

## 2020-03-19 VITALS
HEIGHT: 63 IN | DIASTOLIC BLOOD PRESSURE: 67 MMHG | BODY MASS INDEX: 35.44 KG/M2 | WEIGHT: 200 LBS | TEMPERATURE: 97 F | SYSTOLIC BLOOD PRESSURE: 127 MMHG | HEART RATE: 83 BPM

## 2020-03-19 DIAGNOSIS — M72.2 PLANTAR FASCIITIS: ICD-10-CM

## 2020-03-19 DIAGNOSIS — M76.72 PERONEAL TENDINITIS OF LEFT LOWER EXTREMITY: Primary | ICD-10-CM

## 2020-03-19 DIAGNOSIS — M79.672 LEFT FOOT PAIN: ICD-10-CM

## 2020-03-19 PROCEDURE — 99213 PR OFFICE/OUTPT VISIT, EST, LEVL III, 20-29 MIN: ICD-10-PCS | Mod: S$GLB,,, | Performed by: PODIATRIST

## 2020-03-19 PROCEDURE — 99213 OFFICE O/P EST LOW 20 MIN: CPT | Mod: S$GLB,,, | Performed by: PODIATRIST

## 2020-03-19 PROCEDURE — 3078F DIAST BP <80 MM HG: CPT | Mod: S$GLB,,, | Performed by: PODIATRIST

## 2020-03-19 PROCEDURE — 3078F PR MOST RECENT DIASTOLIC BLOOD PRESSURE < 80 MM HG: ICD-10-PCS | Mod: S$GLB,,, | Performed by: PODIATRIST

## 2020-03-19 PROCEDURE — 3074F SYST BP LT 130 MM HG: CPT | Mod: S$GLB,,, | Performed by: PODIATRIST

## 2020-03-19 PROCEDURE — 3074F PR MOST RECENT SYSTOLIC BLOOD PRESSURE < 130 MM HG: ICD-10-PCS | Mod: S$GLB,,, | Performed by: PODIATRIST

## 2020-03-19 PROCEDURE — 3008F BODY MASS INDEX DOCD: CPT | Mod: S$GLB,,, | Performed by: PODIATRIST

## 2020-03-19 PROCEDURE — 3008F PR BODY MASS INDEX (BMI) DOCUMENTED: ICD-10-PCS | Mod: S$GLB,,, | Performed by: PODIATRIST

## 2020-03-19 RX ORDER — IBUPROFEN 800 MG/1
TABLET ORAL
Qty: 60 TABLET | Refills: 1 | Status: SHIPPED | OUTPATIENT
Start: 2020-03-19 | End: 2020-05-12 | Stop reason: SDUPTHER

## 2020-03-19 NOTE — PROGRESS NOTES
[address] [place] [address]  Phone: [phone]   [phone]    Patient's PCP:Tanvir Cisneros III, MD  Referring Provider: No ref. provider found    Subjective:      Chief Complaint:: Follow-up (PF LEFT)    EB Zamora is a 60 y.o. female who presents with a follow up plantar fascitis left foot .Pain scale 3/10.Have some burning pain. Since the injection I have had some pain on the outside my foot.Pain scale  9/10.Still icing, running shoes with my inserts.Painful to stand and cook.      Vitals:    03/19/20 1510   BP: 127/67   Pulse: 83   Temp: 97.4 °F (36.3 °C)     Shoe Size:     Past Surgical History:   Procedure Laterality Date    HYSTERECTOMY  1999    Ileocolectomy  2000        KNEE ARTHROSCOPY W/ DEBRIDEMENT      x 2, right    LUMBAR EPIDURAL INJECTION      TONSILLECTOMY      WRIST SURGERY Right     plate     Past Medical History:   Diagnosis Date    Back pain     Degenerative disc disease     GERD (gastroesophageal reflux disease)     Heel spur     Hypertension     Hypertrophy of both inferior nasal turbinates     Nasal septal deviation     Psoriasis     Sciatica      Family History   Problem Relation Age of Onset    Hypertension Father     Cancer Father     Hypertension Mother     Heart disease Mother     Diabetes Brother     Hypertension Brother     Prostate cancer Brother     Diabetes Sister     Hypertension Sister     Ovarian cancer Neg Hx     Breast cancer Neg Hx         Social History:   Marital Status:   Alcohol History:  reports that she drinks alcohol.  Tobacco History:  reports that she quit smoking about 3 years ago. She has a 5.50 pack-year smoking history. She has never used smokeless tobacco.  Drug History:  reports that she does not use drugs.    Review of patient's allergies indicates:   Allergen Reactions    Statins-hmg-coa reductase inhibitors Other (See Comments)     Muscle and joint pain      Naproxen  Itching    Penicillins Itching     Pt states can take Keflex    Pineapple     Sulfa (sulfonamide antibiotics) Itching       Current Outpatient Medications   Medication Sig Dispense Refill    ALPRAZolam (XANAX) 0.5 MG tablet Take 1 tablet (0.5 mg total) by mouth 2 (two) times daily as needed. 60 tablet 2    aspirin (ECOTRIN) 81 MG EC tablet Take 81 mg by mouth once daily.      butalbital-acetaminophen-caffeine -40 mg (FIORICET, ESGIC) -40 mg per tablet Take 1 tablet by mouth every 6 (six) hours as needed.  0    doxycycline (VIBRAMYCIN) 100 MG Cap Take 1 capsule (100 mg total) by mouth every 12 (twelve) hours. 20 capsule 0    estradiol (ESTRACE) 1 MG tablet Take 1 mg by mouth once daily.       fluticasone (FLONASE) 50 mcg/actuation nasal spray 1 spray by Each Nare route once daily.      ibuprofen (ADVIL,MOTRIN) 800 MG tablet TAKE 1 TABLET BY MOUTH THREE TIMES DAILY( EVERY EIGHT HOURS) 60 tablet 1    levocetirizine (XYZAL) 5 MG tablet Take 5 mg by mouth every evening.      magnesium oxide (MAG-OX) 400 mg (241.3 mg magnesium) tablet Take 1 tablet (400 mg total) by mouth 2 (two) times daily. 60 tablet 12    metoprolol tartrate (LOPRESSOR) 25 MG tablet TAKE 1 TABLET BY MOUTH EVERY 12 HOURS 180 tablet 1    pantoprazole (PROTONIX) 40 MG tablet Take 40 mg by mouth once daily.      polyethylene glycol (GLYCOLAX) 17 gram/dose powder TK 17 GRAMS QAM  0    spironolactone (ALDACTONE) 25 MG tablet Take 25 mg by mouth every other day.      sumatriptan (IMITREX) 50 MG tablet Take 1 at onset of headache, may repeat in 2 hours to a max of 3 per day, 2 days per week 9 tablet 5    SUMAtriptan succinate (ZEMBRACE SYMTOUCH) 3 mg/0.5 mL PnIj Use 3 mg sc at onset of severe headacge, may repeat once after 1 hour. Max 2/day 6 Syringe 5     No current facility-administered medications for this visit.        Review of Systems      Objective:        Physical Exam:   Foot Exam  Physical Exam   Musculoskeletal:         Feet:        Imaging:            Assessment:       1. Peroneal tendinitis of left lower extremity    2. Plantar fasciitis - Left Foot    3. Left foot pain      Plan:   Peroneal tendinitis of left lower extremity    Plantar fasciitis - Left Foot  -     ibuprofen (ADVIL,MOTRIN) 800 MG tablet; TAKE 1 TABLET BY MOUTH THREE TIMES DAILY( EVERY EIGHT HOURS)  Dispense: 60 tablet; Refill: 1  -     AIR CAST WALKER BOOT FOR HOME USE    Left foot pain  -     ibuprofen (ADVIL,MOTRIN) 800 MG tablet; TAKE 1 TABLET BY MOUTH THREE TIMES DAILY( EVERY EIGHT HOURS)  Dispense: 60 tablet; Refill: 1  -     AIR CAST WALKER BOOT FOR HOME USE    Treatment of tendonitis with rest, ice, oral NSAID, topical antiinflammatory creams, cam walker boot if needed, and MRI if needed.    I discussed stress fracture of bone, mechanism, delay in radiological findings, possible MRI, use of removable cast for 6 to 8 weeks and rarely surgery.    I am placing the patient in a Cam Walker boot cast with an Ace wrap, she has ice the area daily for 15 min take anti-inflammatories and return to see me in 4 weeks.  No follow-ups on file.    Procedures - None    Counseling:     I provided patient education verbally regarding:   Patient diagnosis, treatment options, as well as alternatives, risks, and benefits.     This note was created using Dragon voice recognition software that occasionally misinterpreted phrases or words.

## 2020-03-30 ENCOUNTER — TELEPHONE (OUTPATIENT)
Dept: NEUROLOGY | Facility: CLINIC | Age: 60
End: 2020-03-30

## 2020-04-24 RX ORDER — SUMATRIPTAN SUCCINATE 4 MG/.5ML
4 INJECTION, SOLUTION SUBCUTANEOUS DAILY PRN
Qty: 6 ML | Refills: 4 | Status: SHIPPED | OUTPATIENT
Start: 2020-04-24 | End: 2020-05-24

## 2020-05-12 ENCOUNTER — OFFICE VISIT (OUTPATIENT)
Dept: PODIATRY | Facility: CLINIC | Age: 60
End: 2020-05-12
Payer: COMMERCIAL

## 2020-05-12 VITALS
DIASTOLIC BLOOD PRESSURE: 79 MMHG | SYSTOLIC BLOOD PRESSURE: 133 MMHG | HEART RATE: 81 BPM | HEIGHT: 63 IN | TEMPERATURE: 97 F | WEIGHT: 196 LBS | BODY MASS INDEX: 34.73 KG/M2

## 2020-05-12 DIAGNOSIS — M72.2 PLANTAR FASCIITIS: ICD-10-CM

## 2020-05-12 DIAGNOSIS — M79.672 LEFT FOOT PAIN: ICD-10-CM

## 2020-05-12 DIAGNOSIS — M76.72 PERONEAL TENDINITIS OF LEFT LOWER EXTREMITY: Primary | ICD-10-CM

## 2020-05-12 PROCEDURE — 3075F SYST BP GE 130 - 139MM HG: CPT | Mod: S$GLB,,, | Performed by: PODIATRIST

## 2020-05-12 PROCEDURE — 3008F BODY MASS INDEX DOCD: CPT | Mod: S$GLB,,, | Performed by: PODIATRIST

## 2020-05-12 PROCEDURE — 3078F PR MOST RECENT DIASTOLIC BLOOD PRESSURE < 80 MM HG: ICD-10-PCS | Mod: S$GLB,,, | Performed by: PODIATRIST

## 2020-05-12 PROCEDURE — 99213 PR OFFICE/OUTPT VISIT, EST, LEVL III, 20-29 MIN: ICD-10-PCS | Mod: S$GLB,,, | Performed by: PODIATRIST

## 2020-05-12 PROCEDURE — 99213 OFFICE O/P EST LOW 20 MIN: CPT | Mod: S$GLB,,, | Performed by: PODIATRIST

## 2020-05-12 PROCEDURE — 3008F PR BODY MASS INDEX (BMI) DOCUMENTED: ICD-10-PCS | Mod: S$GLB,,, | Performed by: PODIATRIST

## 2020-05-12 PROCEDURE — 3078F DIAST BP <80 MM HG: CPT | Mod: S$GLB,,, | Performed by: PODIATRIST

## 2020-05-12 PROCEDURE — 3075F PR MOST RECENT SYSTOLIC BLOOD PRESS GE 130-139MM HG: ICD-10-PCS | Mod: S$GLB,,, | Performed by: PODIATRIST

## 2020-05-12 NOTE — PROGRESS NOTES
[address] [facility] [address]  [place] [address]  Phone: [phone]   [phone]    Patient's PCP:Tanvir Cisneros III, MD  Referring Provider: No ref. provider found    Subjective:      Chief Complaint:: Follow-up (PF and peroneal tendonitis left foot)    EB Zamora is a 60 y.o. female who presents with a follow up on left foot plantar fasciitis and peroneal tendonitis.The bottom outside of my foot feels tingly and numb for about a week. I havent really been in my boot because I have been home.Pain scale 10/10 during the day when I am on my foot. When I stand barefoot it feels like a blister under the third,fourth and fifth toes area.      There were no vitals filed for this visit.  Shoe Size:     Past Surgical History:   Procedure Laterality Date    HYSTERECTOMY  1999    Ileocolectomy  2000        KNEE ARTHROSCOPY W/ DEBRIDEMENT      x 2, right    LUMBAR EPIDURAL INJECTION      TONSILLECTOMY      WRIST SURGERY Right     plate     Past Medical History:   Diagnosis Date    Back pain     Degenerative disc disease     GERD (gastroesophageal reflux disease)     Heel spur     Hypertension     Hypertrophy of both inferior nasal turbinates     Nasal septal deviation     Psoriasis     Sciatica      Family History   Problem Relation Age of Onset    Hypertension Father     Cancer Father     Hypertension Mother     Heart disease Mother     Diabetes Brother     Hypertension Brother     Prostate cancer Brother     Diabetes Sister     Hypertension Sister     Ovarian cancer Neg Hx     Breast cancer Neg Hx         Social History:   Marital Status:   Alcohol History:  reports that she drinks alcohol.  Tobacco History:  reports that she quit smoking about 3 years ago. She has a 5.50 pack-year smoking history. She has never used smokeless tobacco.  Drug History:  reports that she does not use drugs.    Review of patient's allergies indicates:   Allergen Reactions     Statins-hmg-coa reductase inhibitors Other (See Comments)     Muscle and joint pain      Naproxen Itching    Penicillins Itching     Pt states can take Keflex    Pineapple     Sulfa (sulfonamide antibiotics) Itching       Current Outpatient Medications   Medication Sig Dispense Refill    ALPRAZolam (XANAX) 0.5 MG tablet Take 1 tablet (0.5 mg total) by mouth 2 (two) times daily as needed. 60 tablet 2    aspirin (ECOTRIN) 81 MG EC tablet Take 81 mg by mouth once daily.      butalbital-acetaminophen-caffeine -40 mg (FIORICET, ESGIC) -40 mg per tablet Take 1 tablet by mouth every 6 (six) hours as needed.  0    estradiol (ESTRACE) 1 MG tablet Take 1 mg by mouth once daily.       fluticasone (FLONASE) 50 mcg/actuation nasal spray 1 spray by Each Nare route once daily.      ibuprofen (ADVIL,MOTRIN) 800 MG tablet TAKE 1 TABLET BY MOUTH THREE TIMES DAILY( EVERY EIGHT HOURS) 60 tablet 1    levocetirizine (XYZAL) 5 MG tablet Take 5 mg by mouth every evening.      magnesium oxide (MAG-OX) 400 mg (241.3 mg magnesium) tablet Take 1 tablet (400 mg total) by mouth 2 (two) times daily. 60 tablet 12    metoprolol tartrate (LOPRESSOR) 25 MG tablet TAKE 1 TABLET BY MOUTH EVERY 12 HOURS 180 tablet 1    pantoprazole (PROTONIX) 40 MG tablet Take 40 mg by mouth once daily.      polyethylene glycol (GLYCOLAX) 17 gram/dose powder TK 17 GRAMS QAM  0    spironolactone (ALDACTONE) 25 MG tablet Take 25 mg by mouth every other day.      sumatriptan (IMITREX) 50 MG tablet Take 1 at onset of headache, may repeat in 2 hours to a max of 3 per day, 2 days per week (Patient not taking: Reported on 5/12/2020) 9 tablet 5    SUMAtriptan succinate (IMITREX) 4 mg/0.5 mL PnIj Inject 4 mg into the skin daily as needed. (Patient not taking: Reported on 5/12/2020) 6 mL 4     No current facility-administered medications for this visit.        Review of Systems      Objective:        Physical Exam:   Foot Exam    General  General  Appearance: appears stated age and healthy   Orientation: alert and oriented to person, place, and time   Affect: appropriate   Gait: antalgic       Left Foot/Ankle      Inspection and Palpation  Ecchymosis: none  Tenderness: fifth metatarsal base (Mild pain along peroneus brevis tendon pain Lisfranc joint particularly 4th and 5th metatarsal cuboid joint.)  Swelling: (Mild swelling dorsal 4th metatarsal 5th metatarsal cuboid articulation and peroneus brevis tendon insertion.)  Arch: normal  Skin Exam: skin intact;     Neurovascular  Dorsalis pedis: 2+  Posterior tibial: 2+  Saphenous nerve sensation: normal  Tibial nerve sensation: normal  Superficial peroneal nerve sensation: normal  Deep peroneal nerve sensation: normal  Sural nerve sensation: normal    Muscle Strength  Ankle dorsiflexion: 5  Ankle plantar flexion: 5  Ankle inversion: 5  Ankle eversion: 5  Great toe extension: 5  Great toe flexion: 5    Range of Motion    Normal left ankle ROM  Passive  Ankle eversion: pain  Passive eversion: Peroneus brevis tendon insertion and 4th and 5th metatarsal cuboid articulation.  Ankle inversion: pain  Passive inversion: Peroneus brevis tendon insertion and 4th and 5th metatarsal cuboid articulation.    Active  Ankle eversion: pain  Left ankle active eversion: Peroneus brevis tendon insertion and 4th and 5th metatarsal cuboid articulation.  Ankle inversion: pain  Left ankle active inversion: Peroneus brevis tendon insertion and 4th and 5th metatarsal cuboid articulation.          Physical Exam   Cardiovascular:   Pulses:       Dorsalis pedis pulses are 2+ on the left side.        Posterior tibial pulses are 2+ on the left side.   Musculoskeletal:        Feet:        Imaging:            Assessment:       1. Peroneal tendinitis of left lower extremity - Left Foot    2. Plantar fasciitis - Left Foot    3. Left foot pain - Left Foot      Plan:   Peroneal tendinitis of left lower extremity - Left Foot    Plantar fasciitis -  Left Foot    Left foot pain - Left Foot     1. Patient will continue Cam Walker boot cast with insert, ice the area daily in take anti-inflammatories.  2.  Because of continued pain inferior to respond to conservative care including Cam Walker boot cast from order an MRI of the mid foot with without contrast to see if there is any tear of the peroneus brevis tendon or any problems with Lisfranc joint.    3.  Return to see me to review MRI.  No follow-ups on file.    Procedures - None    Counseling:   I discussed conservative treatment of minor tendon tear with cast for 6 to 8 weeks, MRI, ultrasound if needed for evaluation of the rupture and possible need for surgical repair.  I provided patient education verbally regarding:   Patient diagnosis, treatment options, as well as alternatives, risks, and benefits.     This note was created using Dragon voice recognition software that occasionally misinterpreted phrases or words.

## 2020-05-21 ENCOUNTER — HOSPITAL ENCOUNTER (OUTPATIENT)
Dept: RADIOLOGY | Facility: HOSPITAL | Age: 60
Discharge: HOME | End: 2020-05-21
Attending: PODIATRIST
Payer: COMMERCIAL

## 2020-05-21 DIAGNOSIS — M76.72 PERONEAL TENDINITIS OF LEFT LOWER EXTREMITY: ICD-10-CM

## 2020-05-21 PROCEDURE — 73718 MRI LOWER EXTREMITY W/O DYE: CPT | Mod: TC,PO,LT

## 2020-05-27 ENCOUNTER — LAB VISIT (OUTPATIENT)
Dept: PRIMARY CARE CLINIC | Facility: CLINIC | Age: 60
End: 2020-05-27
Payer: COMMERCIAL

## 2020-05-27 DIAGNOSIS — R05.9 COUGH: Primary | ICD-10-CM

## 2020-05-27 PROCEDURE — U0003 INFECTIOUS AGENT DETECTION BY NUCLEIC ACID (DNA OR RNA); SEVERE ACUTE RESPIRATORY SYNDROME CORONAVIRUS 2 (SARS-COV-2) (CORONAVIRUS DISEASE [COVID-19]), AMPLIFIED PROBE TECHNIQUE, MAKING USE OF HIGH THROUGHPUT TECHNOLOGIES AS DESCRIBED BY CMS-2020-01-R: HCPCS

## 2020-05-29 LAB — SARS-COV-2 RNA RESP QL NAA+PROBE: NOT DETECTED

## 2020-06-01 ENCOUNTER — TELEPHONE (OUTPATIENT)
Dept: PODIATRY | Facility: CLINIC | Age: 60
End: 2020-06-01

## 2020-06-01 NOTE — TELEPHONE ENCOUNTER
----- Message from Jony Wang DPM sent at 6/1/2020 12:35 PM CDT -----  I do not remember whether I sent you a message for patient to either see me face-to-face or do a telemedicine to review the MRI results

## 2020-06-01 NOTE — TELEPHONE ENCOUNTER
Called patient per Dr. Wang to schedule appointment for either face to face appointment or telemedicine visit to review MRI Results. She was routed to front office to schedule.

## 2020-06-01 NOTE — TELEPHONE ENCOUNTER
Patient left message requesting information and follow-up care plan after MRI was preformed 05/15/20. Please advise.

## 2020-06-05 ENCOUNTER — OFFICE VISIT (OUTPATIENT)
Dept: PODIATRY | Facility: CLINIC | Age: 60
End: 2020-06-05
Payer: COMMERCIAL

## 2020-06-05 DIAGNOSIS — M76.72 PERONEAL TENDINITIS OF LEFT LOWER EXTREMITY: Primary | ICD-10-CM

## 2020-06-05 PROCEDURE — 99213 PR OFFICE/OUTPT VISIT, EST, LEVL III, 20-29 MIN: ICD-10-PCS | Mod: 95,,, | Performed by: PODIATRIST

## 2020-06-05 PROCEDURE — 99213 OFFICE O/P EST LOW 20 MIN: CPT | Mod: 95,,, | Performed by: PODIATRIST

## 2020-06-05 NOTE — PROGRESS NOTES
Subjective:        The chief complaint leading to consultation is:  Continued tendonitis and swelling of the left foot.  The patient states that the foot still hurts and swells and is not greatly improved with Cam Walker boot cast.  The patient location is:  \[facility]\""  Visit type: Virtual visit with synchronous audio/video   This was a video visit in lieu of in-person visit due to the coronavirus emergency. Patient acknowledged and consented to the video visit encounter.     HPI    Past Surgical History:   Procedure Laterality Date    HYSTERECTOMY  1999    Ileocolectomy  2000        KNEE ARTHROSCOPY W/ DEBRIDEMENT      x 2, right    LUMBAR EPIDURAL INJECTION      TONSILLECTOMY      WRIST SURGERY Right     plate     Past Medical History:   Diagnosis Date    Back pain     Degenerative disc disease     GERD (gastroesophageal reflux disease)     Heel spur     Hypertension     Hypertrophy of both inferior nasal turbinates     Nasal septal deviation     Psoriasis     Sciatica      Family History   Problem Relation Age of Onset    Hypertension Father     Cancer Father     Hypertension Mother     Heart disease Mother     Diabetes Brother     Hypertension Brother     Prostate cancer Brother     Diabetes Sister     Hypertension Sister     Ovarian cancer Neg Hx     Breast cancer Neg Hx         Social History:   Marital Status:   Alcohol History:  reports that she drinks alcohol.  Tobacco History:  reports that she quit smoking about 3 years ago. She has a 5.50 pack-year smoking history. She has never used smokeless tobacco.  Drug History:  reports that she does not use drugs.    Review of patient's allergies indicates:   Allergen Reactions    Statins-hmg-coa reductase inhibitors Other (See Comments)     Muscle and joint pain      Naproxen Itching    Penicillins Itching     Pt states can take Keflex    Pineapple     Sulfa (sulfonamide antibiotics) Itching        Current Outpatient Medications   Medication Sig Dispense Refill    ALPRAZolam (XANAX) 0.5 MG tablet Take 1 tablet (0.5 mg total) by mouth 2 (two) times daily as needed. 30 tablet 1    aspirin (ECOTRIN) 81 MG EC tablet Take 81 mg by mouth once daily.      butalbital-acetaminophen-caffeine -40 mg (FIORICET, ESGIC) -40 mg per tablet Take 1 tablet by mouth every 6 (six) hours as needed.  0    estradioL (ESTRACE) 1 MG tablet Take 1 tablet (1 mg total) by mouth once daily. 90 tablet 1    fluticasone propionate (FLONASE) 50 mcg/actuation nasal spray 1 spray (50 mcg total) by Each Nostril route once daily. 16 g 5    ibuprofen (ADVIL,MOTRIN) 800 MG tablet TAKE 1 TABLET BY MOUTH THREE TIMES DAILY( EVERY EIGHT HOURS) 90 tablet 1    levocetirizine (XYZAL) 5 MG tablet Take 5 mg by mouth every evening.      magnesium oxide (MAG-OX) 400 mg (241.3 mg magnesium) tablet Take 1 tablet (400 mg total) by mouth 2 (two) times daily. 60 tablet 12    metoprolol tartrate (LOPRESSOR) 25 MG tablet Take 1 tablet (25 mg total) by mouth every 12 (twelve) hours. 180 tablet 1    pantoprazole (PROTONIX) 40 MG tablet Take 1 tablet (40 mg total) by mouth once daily. 90 tablet 1    polyethylene glycol (GLYCOLAX) 17 gram/dose powder TK 17 GRAMS QAM  0    spironolactone (ALDACTONE) 25 MG tablet Take 1 tablet (25 mg total) by mouth every other day. 45 tablet 1    sumatriptan (IMITREX) 50 MG tablet Take 1 at onset of headache, may repeat in 2 hours to a max of 3 per day, 2 days per week (Patient not taking: Reported on 5/12/2020) 9 tablet 5     No current facility-administered medications for this visit.        Review of Systems      Objective:        Physical Exam:   Physical Exam   Physical exam through the computer shows that the lateral surface of the 5th metatarsal and peroneus brevis tendon is still swollen and has pain to palpation range of motion.  She does have good range of motion appears to tendon is still intact.  I  cannot see any open lesions any other signs of any pathology.  Ft has a normal appearance with weight-bearing but she has pain with weight-bearing and range of motion.  I reviewed the MRI results with the patient       Assessment:       1. Peroneal tendinitis of left lower extremity      Plan:   Peroneal tendinitis of left lower extremity     1.  I did the evaluation on the telemedicine of the left affected lower extremity.  2.  She not discussed the treatment options I explained to the MRI does not show any definitive tear of the tendon were does show edema at the insertion and along the course of the tendon.  Since was no tear I am recommending she continue Cam Walker boot cast ice to the area anti-inflammatories and have the office arrange for her to go to physical therapy 3 times a week for a month and then she and I will re-evaluate the situation.  3.  Office will range for physical therapy 3 times a week for month for increased range of motion and strength a decreased pain and swelling of left foot and ankle.  4.  Return to see me approximately 5 weeks.  No follow-ups on file.    Total time spent with patient:  6 min    Each patient to whom he or she provides medical services by telemedicine is:  (1) informed of the relationship between the physician and patient and the respective role of any other health care provider with respect to management of the patient; and (2) notified that he or she may decline to receive medical services by telemedicine and may withdraw from such care at any time.    This note was created using [employer] voice recognition software that occasionally misinterprets phrases or words.

## 2020-06-10 ENCOUNTER — CLINICAL SUPPORT (OUTPATIENT)
Dept: REHABILITATION | Facility: HOSPITAL | Age: 60
End: 2020-06-10
Attending: PODIATRIST
Payer: MEDICARE

## 2020-06-10 DIAGNOSIS — M76.72 PERONEAL TENDINITIS OF LEFT LOWER EXTREMITY: ICD-10-CM

## 2020-06-10 DIAGNOSIS — R29.898 IMPAIRED FLEXIBILITY OF LOWER EXTREMITY: ICD-10-CM

## 2020-06-10 DIAGNOSIS — R29.898 DECREASED STRENGTH OF LOWER EXTREMITY: ICD-10-CM

## 2020-06-10 DIAGNOSIS — R52 TENDERNESS: ICD-10-CM

## 2020-06-10 DIAGNOSIS — M72.2 PLANTAR FASCIITIS, LEFT: ICD-10-CM

## 2020-06-10 PROCEDURE — 97161 PT EVAL LOW COMPLEX 20 MIN: CPT

## 2020-06-10 NOTE — PLAN OF CARE
[facility]  Physical Therapy Initial Evaluation    [facility]  [facility] Number: [id number]    Therapy Diagnosis:   Encounter Diagnoses   Name Primary?    Peroneal tendinitis of left lower extremity     Decreased strength of lower extremity     Tenderness     Impaired flexibility of lower extremity     Plantar fasciitis, left      Physician: Jony Wang DPM    Physician Orders: PT Eval and Treat  Medical Diagnosis from Referral: M76.72 (ICD-10-CM) - Peroneal tendinitis of left lower extremity  Evaluation Date: 6/10/2020  Authorization Period Expiration: 12/31/2020  Current Certification Period: 6/10/2020 - 7/31/2020  Plan of Care Expiration: 7/31/2020  Visit # / Visits authorized: 1/ 40 (0/14 per POC)    Time In: 1450  Time Out: 1540  Total Appointment Time (timed & untimed codes): 50 minutes    Precautions: Standard    Subjective   Date of onset: Nov 2019, with gradual worsening since then   History of current condition - Apula reports: she does not have CAM boot today because of the rain and the heat. Pt reports after wearing her boot for period of time she sees swelling at the bottom and lateral side and recently experienced a burning sensation following wearing the CAM boot in which she feels like it gets tight as she weight bears. Pt reports she got the insert from [facility] and also order one online and it feels good. Caresoles online and they feels good. Pt reports she is able to stand and cook but if too long she gets swelling. Pt reports she is able to stand for about 30 min prior to needing to sit. She tries to do too much at one time. Pt reports she has been elevating and applying ice which she see some improvements. Walking too far and long increases her pain and now tries to limit that. Pt was working out at the gym and stopped. Pt was doing treadmill and equipment. She has had injection 2-3x so far and last one was in Feb. Pt  reports she did not get much relief after the last injection. Pt reports she has not been having issues in the heel which is where the pain was before but now more under the foot itself now along the lateral side as well. Pt reports occasional numbness. Pt reports some tenderness to the touch. Pt reports the pain has waken her up in the past and she sleeps better now since starting ibuprofen. Lateral plantar pain started following injection and more when she started wearing CAM boot which she started wearing in March.      Medical History:   Past Medical History:   Diagnosis Date    Back pain     Degenerative disc disease     GERD (gastroesophageal reflux disease)     Heel spur     Hypertension     Hypertrophy of both inferior nasal turbinates     Nasal septal deviation     Psoriasis     Sciatica        Surgical History:   Paula Zamora  has a past surgical history that includes Knee arthroscopy w/ debridement; Tonsillectomy; Wrist surgery (Right); Lumbar epidural injection; Hysterectomy (1999); and Ileocolectomy (2000).    Medications:   Paula has a current medication list which includes the following prescription(s): alprazolam, aspirin, butalbital-acetaminophen-caffeine -40 mg, estradiol, fluticasone propionate, ibuprofen, levocetirizine, magnesium oxide, metoprolol tartrate, pantoprazole, polyethylene glycol, spironolactone, and sumatriptan.    Allergies:   Review of patient's allergies indicates:   Allergen Reactions    Statins-hmg-coa reductase inhibitors Other (See Comments)     Muscle and joint pain      Naproxen Itching    Penicillins Itching     Pt states can take Keflex    Pineapple     Sulfa (sulfonamide antibiotics) Itching        Imaging, MRI studies: IMPRESSION: 5/21/2020    1.  Trace tendon sheath fluid of the peroneal tendons distal to  lateral malleolus and a small amount of tendon sheath fluid at the  peroneus brevis insertion on the base of the fifth metatarsal is  felt  to reflect mild peritendinitis. No peroneal tendon tear identified.  2.  Mild Achilles tendinosis.  3.  Fluid superficial and deep to a thin ATFL likely reflects mild  sprain. Correlate.  4.  Mild midfoot osteophytosis.    Prior Therapy: none   Social History: one story home, 10 steps (pain but can go up a certain way/sideways) lives with their spouse  Occupation/Hobbies: swim, exercise, but unable at this time, mostly sits on the computer currently   Prior Level of Function: independent   Current Level of Function: grandson sometimes helps with the boot, able to do what she can    Pain:  Current 5/10, worst 10/10, best 5/10   Location: left foot ankle    Description: Burning and Numb  Aggravating Factors: Standing and Walking  Easing Factors: ice, 800 mg ibuprofen takes half in the morning and mid day then a whole before bed, and injections     Pts goals: be able to do more with my feet, return exercise, be able to walk and stand longer     Objective     Structural/Postural Inspection: obesity, decreased medial arch height bilaterally. Observation: swelling below lateral malleolus and medial malleolus bilaterally with slight greater swelling demonstrated on the L.     Palpation for Condition:   - tenderness to palpation of L lateral side of foot along 5th metatarsal at metatarsal head, base of 5th, and cuboid  - negative for tenderness along lateral side of lower leg lateral malleolus/tibia/along peroneals, negative at medial malleolus     Circumference/Swelling:   Figure 8: 52 cm bilaterally, at malleoli: 32 cm bilaterally     Active Range of Motion (AROM)/Passive Range of Motion (PROM):     Hip/Knee/Ankle (Degrees) AROM (Right) PROM (Right) AROM (Left) PROM (Left) Comments   Hip Flexion        Hip Extension        Hip Abduction        Hip Adduction        Hip Internal Rotation        Hip External Rotation        Knee Flexion        Knee Extension        Ankle Dorsiflexion 15  15 20 Pulling at bottom of  foot with passive    Ankle Plantarflexion 50  50 60 Slight pain at anterior ankle joint with passive    Ankle Inversion 35  35     Ankle Eversion 35  35       Joint Accessory: normal AP/PA of metatarsals    Muscle Length Tension Testing:     Lumbar/Hip/Knee Right  Left  Comments   Gastrocnemius 0 0    Hamstrings- 90/90 NT NT      Manual Muscle Testing:     RLE Strength Grade LLE Strength Grade   Hip Flexion 4+/5 Hip Flexion 4+/5   Hip Extension 4/5 Hip Extension 4/5   Hip Abduction 4+/5 Hip Abduction 4+/5   Hip Adduction 4/5 Hip Adduction 4/5   Hip Internal Rotation 4+/5 Hip Internal Rotation 4+/5   Hip External Rotation 4/5 Hip External Rotation 4/5   Knee Flexion 4+/5 Knee Flexion 4+/5   Knee Extension 5/5 Knee Extension 5/5   Ankle Dorsiflexion 5/5 Ankle Dorsiflexion 5/5   Ankle Plantarflexion 4-/5 Ankle Plantarflexion 4-/5   Ankle Inversion 5/5 Ankle Inversion 4/5*pain under the foot    Ankle Eversion 4+/5 Ankle Eversion 4+/5                  Special Tests:       Ankle Left   Anterior Drawer Test nt   Eversion Stress Test/ Talar tilt Negative.   Inversion Stress Test/ talar tilt  Negative.   [facility] Test Negative.   Arc Sign  Negative.   Mckenna's Squeeze Test Negative.     Movement Analysis: guarded movement with ambulation, decreased weight shift to the R, decreased step length, decreased heel strike, limp, L LE abducted to decrease WB    SLS on Right: 15 sec  SLS on Left: 11 sec     Sensation: intact to light touch     ALAYNA: 51/104 = 49% function     TREATMENT       Home Exercises and Patient Education Provided    Education provided:   - written HEP given, pt demonstrated good understanding based on returned demonstration   - discharge the CAM boot started wearing tennis shoes , importance of focusing on gait mechanics to improve heel strike     Written Home Exercises Provided: yes.  Exercises were reviewed and Paula was able to demonstrate them prior to the end of the session.  Paula  demonstrated good  understanding of the education provided.     See EMR under Patient Instructions for exercises provided 6/10/2020.    Assessment   Paula is a 60 y.o. female referred to outpatient Physical Therapy with a medical diagnosis of Peroneal tendinitis of left lower extremity. Pt presents with c/o of chronic L foot pain with gradual insidious onset that started in November 2019 with gradual worsening of pain. Pt reports she started to wear a CAM boot in March 2020. Pt reports the CAM boot helped but reports that the boot was tight and increased her pain when she wore it for extended periods of time and one time she had a burning sensation in her foot when she removed it. Based on examination, lateral foot pain and swelling may be due to spending extended periods of time in CAM boot which patient reports was tight; therefore, boot may have been compressing on the lateral side of her foot along the 5th metatarsal and peroneals leading to lateral foot pain. Resisted eversion did not cause an increase in lateral lower leg pain decreasing likelihood for peroneal tendinitis. Pt presents with signs and symptoms of plantar fasciopathy as evident by subjective reports and objective measures. Pt demonstrates tightness of B gastros, decreased BLE strength, impaired gait mechanics, impaired SLS time, and decreased functional mobility leading to 49% function based on the ALAYNA measure. PT educated patient to discharge CAM boot and continue to wear her tennis shoes with inserts. Pt will benefit from skilled PT services to improve the above deficits which will lead to improve functional mobility and quality of life and ability to return to more active lifestyle.     Pt prognosis is Good.   Pt will benefit from skilled outpatient Physical Therapy to address the deficits stated above and in the chart below, provide pt/family education, and to maximize pt's level of independence.     Plan of care discussed with patient:  Yes  Pt's spiritual, cultural and educational needs considered and patient is agreeable to the plan of care and goals as stated below:     Anticipated Barriers for therapy: co morbidities, age, overweight     Medical Necessity is demonstrated by the following  History  Co-morbidities and personal factors that may impact the plan of care Co-morbidities:   anxiety, high BMI and HTN    Personal Factors:   no deficits     moderate   Examination  Body Structures and Functions, activity limitations and participation restrictions that may impact the plan of care Body Regions:   lower extremities    Body Systems:    gross symmetry  ROM  strength  gross coordinated movement  balance  gait  transfers    Participation Restrictions:   Unable to engage in exercises and PLOF     Activity limitations:   Learning and applying knowledge  no deficits    General Tasks and Commands  no deficits    Communication  no deficits    Mobility  walking    Self care  no deficits    Domestic Life  shopping  cooking  doing house work (cleaning house, washing dishes, laundry)    Interactions/Relationships  no deficits    Life Areas  no deficits    Community and Social Life  community life  recreation and leisure         high   Clinical Presentation stable and uncomplicated low   Decision Making/ Complexity Score: low     Goals:    STG  Weeks/Visits Date Established  Goal Status    1.Pt will report at worst pain of </= 6/10 to improve quality of life 4 weeks  6/10/2020      2. Pt will report >/= 65/104 on ALAYNA to indicate improved functional mobility  4 weeks  6/10/2020      3.Pt will report she is able to stand for >/= 45 min to improve standing tolerance in the kitchen  4 weeks  6/10/2020      4.Pt will perform SLS on LLE for >/= 15 sec to decrease risk for falls  4 weeks  6/10/2020      5.Pt will demonstrate compliance and independence with HEP to improve therapy outcomes  4 weeks  6/10/2020        LTG Weeks/Visits Date Established  Goal Status     1.Pt will report at worst pain of </= 6/10 to improve quality of life 8 weeks 6/10/2020      2. Pt will report >/= 80/104 on ALAYNA to indicate improved functional mobility 8 weeks 6/10/2020        3.Pt will report she is able to stand for >/= 1 hour to improve standing tolerance in the kitchen  8 weeks 6/10/2020      4.Pt will perform SLS on LLE for >/= 20 sec to decrease risk for falls  8 weeks 6/10/2020      5.Pt will increase LLE strength to 5/5 to improve functional mobility  8 weeks 6/10/2020      6. Pt will navigate stairs with reciprocal gait pattern with 1 HR to improve stair navigation at home  8 weeks  6/10/2020          Plan   Plan of care Certification: 6/10/2020 to 7/31/2020.    Outpatient Physical Therapy 2 times weekly for 6 weeks followed by 1x/wk for 2 weeks to include the following interventions: Electrical Stimulation TENs, Gait Training, Manual Therapy, Moist Heat/ Ice, Neuromuscular Re-ed, Patient Education, Self Care, Therapeutic Activites, Therapeutic Exercise and Ultrasound. Pt may be seen by PTA as part of the rehabilitation team.    Corrie Marie, PT

## 2020-06-12 NOTE — PROGRESS NOTES
[facility] OUTPATIENT  Physical Therapy Daily Treatment Note     Name: Paula ObregonNatanael  Clinic Number: [id number]    Therapy Diagnosis:   Encounter Diagnoses   Name Primary?    Decreased strength of lower extremity Yes    Tenderness     Impaired flexibility of lower extremity     Plantar fasciitis, left      Physician: Jony Wang DPM    Visit Date: 6/16/2020    Physician Orders: PT Eval and Treat  Medical Diagnosis from Referral: M76.72 (ICD-10-CM) - Peroneal tendinitis of left lower extremity      Evaluation Date: 6/10/2020  Authorization Period Expiration: 12/31/2020  Current Certification Period: 6/10/2020 - 7/31/2020  Plan of Care Expiration: 7/31/2020    Visit # / Visits authorized: 2/ 40 (0/14 per POC) (Freq Ev + 2W6,1W2)         Total Visit count: 2    PTA Visit: 0      Re-assessment due by: 7/10/2020      Time In: 0945  Time Out: 1040        Precautions: Standard      Subjective     Pt reports: she has no pain only some discomfort to her L lateral foot, she adds that she has been complaint with her HEP.        She was compliant with home exercise program.    Response to previous treatment: First visit following eval.  Functional change: Non-remarkable      Pain: 0/10  Location: left feet        Objective       Paula received therapeutic exercises to develop strength, endurance, ROM and flexibility for 40 minutes including:    · Nu Step L1  x 5 min  · Standing slant board gastroc stretch 30 sec x 2  · Seated hamstring stretch 30 sec x 2  · Arch doming x 1 min x 2  · Toe yoga 30 sec x 2  · Ankle four way 10 x 2  · Towel scrunches 30 sec x 2  · Marbles In/Ev 20 x 1           Paula received the following manual therapy techniques: N/A were applied to the: N/A for N/A minutes, including:      Paula participated in neuromuscular re-education activities to improve: Balance, Coordination and Proprioception for 4 minutes. The following activities were  included:    · BAPS board L3 DF/PF/IN/EV 30 sec x 2 each      Paula participated in dynamic functional therapeutic activities to improve functional performance for 0  minutes, including:      Paula participated in gait training to improve functional mobility and safety for 0  minutes, including:      Paula received the following direct contact modalities after being cleared for contraindications:N/A     Paula received the following supervised modalities after being cleared for contradictions N/A    Paula received hot pack for 0 minutes to N/A.    Paula received cold pack for 8 minutes to L ankle foot for edema control      Home Exercises Provided and Patient Education Provided     Education provided:   - Reviewed HEP and educated on mode frequency reps, and sets as well as when to stop TE.  Patient verbalzied understanding, performed return demonstration and with no adverse affects noted nor verbalized.        Written Home Exercises Provided: Patient instructed to cont prior HEP.  Exercises were reviewed and Paula was able to demonstrate them prior to the end of the session.  Paula demonstrated fair  understanding of the education provided.     See EMR under Media for exercises provided prior visit.    Assessment     Patient participated with PT to address her current deficits with ROM/ flexibility, strength activity haylee and to mitigate pain.  She required demonstration and cueing for improved exs quality and performance and was challenged with all ankle ROM exs.with compensatory movements to her L lower leg/tibia.  HEP was reinforced and she received cryo to her L ankle post Rx in order to address any edema post Rx.  She is expected to improve and progress with cont PT Rx.      Paula is progressing well towards her goals.   Pt prognosis is Good.     Pt will continue to benefit from skilled outpatient physical therapy to address the deficits listed in the problem list box on initial evaluation, provide pt/family  education and to maximize pt's level of independence in the home and community environment.     Pt's spiritual, cultural and educational needs considered and pt agreeable to plan of care and goals.       Anticipated barriers to physical therapy:  co morbidities, age, overweight       Goals:     STG  Weeks/Visits Date Established  Goal Status    1.Pt will report at worst pain of </= 6/10 to improve quality of life 4 weeks  6/10/2020    In progress 6/16/2020    2. Pt will report >/= 65/104 on ALAYNA to indicate improved functional mobility  4 weeks  6/10/2020        3.Pt will report she is able to stand for >/= 45 min to improve standing tolerance in the kitchen  4 weeks  6/10/2020        4.Pt will perform SLS on LLE for >/= 15 sec to decrease risk for falls  4 weeks  6/10/2020    In progress 6/16/2020    5.Pt will demonstrate compliance and independence with HEP to improve therapy outcomes  4 weeks  6/10/2020     In progress 6/16/2020        LTG Weeks/Visits Date Established  Goal Status    1.Pt will report at worst pain of </= 6/10 to improve quality of life 8 weeks 6/10/2020        2. Pt will report >/= 80/104 on ALAYNA to indicate improved functional mobility 8 weeks 6/10/2020           3.Pt will report she is able to stand for >/= 1 hour to improve standing tolerance in the kitchen  8 weeks 6/10/2020        4.Pt will perform SLS on LLE for >/= 20 sec to decrease risk for falls  8 weeks 6/10/2020        5.Pt will increase LLE strength to 5/5 to improve functional mobility  8 weeks 6/10/2020        6. Pt will navigate stairs with reciprocal gait pattern with 1 HR to improve stair navigation at home  8 weeks  6/10/2020                   Plan     Cont with current POC and progress as haylee possible KT for edema        Aquiles Pickens, PT

## 2020-06-16 ENCOUNTER — CLINICAL SUPPORT (OUTPATIENT)
Dept: REHABILITATION | Facility: HOSPITAL | Age: 60
End: 2020-06-16
Payer: MEDICARE

## 2020-06-16 DIAGNOSIS — R29.898 DECREASED STRENGTH OF LOWER EXTREMITY: Primary | ICD-10-CM

## 2020-06-16 DIAGNOSIS — M72.2 PLANTAR FASCIITIS, LEFT: ICD-10-CM

## 2020-06-16 DIAGNOSIS — R52 TENDERNESS: ICD-10-CM

## 2020-06-16 DIAGNOSIS — R29.898 IMPAIRED FLEXIBILITY OF LOWER EXTREMITY: ICD-10-CM

## 2020-06-16 PROCEDURE — 97110 THERAPEUTIC EXERCISES: CPT

## 2020-06-16 NOTE — PROGRESS NOTES
[facility] OUTPATIENT  Physical Therapy Daily Treatment Note     [facility] [place]  Clinic Number: [id number]    Therapy Diagnosis:   Encounter Diagnoses   Name Primary?    Decreased strength of lower extremity Yes    Tenderness     Impaired flexibility of lower extremity     Plantar fasciitis, left      Physician: Jony Wang DPM    Visit Date: 6/18/2020    Physician Orders: PT Eval and Treat  Medical Diagnosis from Referral: M76.72 (ICD-10-CM) - Peroneal tendinitis of left lower extremity      Evaluation Date: 6/10/2020  Authorization Period Expiration: 12/31/2020  Current Certification Period: 6/10/2020 - 7/31/2020  Plan of Care Expiration: 7/31/2020    Visit # / Visits authorized: 3/ 40 (0/14 per POC) (Freq Ev + 2W6,1W2)         Total Visit count: 3    PTA Visit: 0      Re-assessment due by: 7/10/2020      Time In: 1045  Time Out: 1135        Precautions: Standard      Subjective     Pt reports: she has no pain only some discomfort to her L lateral foot, she adds that she has been complaint with her HEP.        She was compliant with home exercise program.    Response to previous treatment: No adverse affects.  Functional change: Non-remarkable      Pain: 0/10  Location: left feet        Objective       Paula received therapeutic exercises to develop strength, endurance, ROM and flexibility for 44 minutes including: TE and KT for edema control        · Nu Step L1  x 5 min  · Standing slant board gastroc stretch 30 sec x 2  · Seated hamstring stretch 60 sec   · Arch doming x 1 min x 2  · Toe yoga 30 sec x 2  · Ankle four way 10 x 2-held  · Towel scrunches 30 sec x 3  · Marbles In/Ev 20 x 1     Kinesiotaping  Patient was screened for use of kinesiotape and was cleared for use. Pt. received kinesiotaping on L foot/ankle edema control.  1. Has the patient ever had a reaction to the adhesive in bandaids? No:   2. Has the patient ever uses  athletic/kinesiotape in the past? No  3. Is the patient hemodynamically impaired (PE, DVT, CHF, Kidney failure)? Yes/No: No  4. Can the PT/PTA apply the tape to your skin? Yes/No: Yes     Patient was instructed on duration to wear the tape, proper drying techniques for the tape, and to remove the tape if he/she has any skin irritation: including, but not exclusive to, redness/itchiness/discomfort. Patient verbalized understanding of these instructions.          Paula received the following manual therapy techniques: N/A were applied to the: N/A for N/A minutes, including:      Paula participated in neuromuscular re-education activities to improve: Balance, Coordination and Proprioception for 4 minutes. The following activities were included:    · BAPS board L3 DF/PF/IN/EV/CW 30 sec x 2 each      Paula participated in dynamic functional therapeutic activities to improve functional performance for 0  minutes, including:      Paula participated in gait training to improve functional mobility and safety for 0  minutes, including:      Paula received the following direct contact modalities after being cleared for contraindications:N/A     Paula received the following supervised modalities after being cleared for contradictions N/A    Paula received hot pack for 0 minutes to N/A.    Paula received cold pack for 8 minutes to L ankle foot for edema control      Home Exercises Provided and Patient Education Provided     Education provided:   - Reviewed HEP and educated on mode frequency reps, and sets as well as when to stop TE.  Patient verbalzied understanding, performed return demonstration and with no adverse affects noted nor verbalized.        Written Home Exercises Provided: Patient instructed to cont prior HEP.  Exercises were reviewed and Paula was able to demonstrate them prior to the end of the session.  Paula demonstrated fair  understanding of the education provided.     See EMR under Media for exercises  provided prior visit.    Assessment     Pateint participated with PT to address her ankle /foot deficits as well as for edema control.  She has made improvements with improved control of L ankle/foot intrinsic/extrinsic musculature as evidenced by improved control with her toe yoga and arch doming exs.  She does still require VC for improved control with her IN/Ev exs to mitigate compensatory tibia movement.  Patient received KT as noted for L ankle edema and is expected to cont to improve and progress with PT Rx in order to obtain her established goals.        Paula is progressing well towards her goals.   Pt prognosis is Good.     Pt will continue to benefit from skilled outpatient physical therapy to address the deficits listed in the problem list box on initial evaluation, provide pt/family education and to maximize pt's level of independence in the home and community environment.     Pt's spiritual, cultural and educational needs considered and pt agreeable to plan of care and goals.       Anticipated barriers to physical therapy:  co morbidities, age, overweight       Goals:     STG  Weeks/Visits Date Established  Goal Status    1.Pt will report at worst pain of </= 6/10 to improve quality of life 4 weeks  6/10/2020    In progress 6/18/2020    2. Pt will report >/= 65/104 on ALAYNA to indicate improved functional mobility  4 weeks  6/10/2020        3.Pt will report she is able to stand for >/= 45 min to improve standing tolerance in the kitchen  4 weeks  6/10/2020        4.Pt will perform SLS on LLE for >/= 15 sec to decrease risk for falls  4 weeks  6/10/2020    In progress 6/18/2020    5.Pt will demonstrate compliance and independence with HEP to improve therapy outcomes  4 weeks  6/10/2020     In progress 6/18/2020        LTG Weeks/Visits Date Established  Goal Status    1.Pt will report at worst pain of </= 6/10 to improve quality of life 8 weeks 6/10/2020        2. Pt will report >/= 80/104 on ALAYNA to  indicate improved functional mobility 8 weeks 6/10/2020           3.Pt will report she is able to stand for >/= 1 hour to improve standing tolerance in the kitchen  8 weeks 6/10/2020        4.Pt will perform SLS on LLE for >/= 20 sec to decrease risk for falls  8 weeks 6/10/2020        5.Pt will increase LLE strength to 5/5 to improve functional mobility  8 weeks 6/10/2020        6. Pt will navigate stairs with reciprocal gait pattern with 1 HR to improve stair navigation at home  8 weeks  6/10/2020                   Plan       Cont with current POC and progress as haylee f/u on effects KT with edema.         Aquiles Pickens, PT

## 2020-06-18 ENCOUNTER — CLINICAL SUPPORT (OUTPATIENT)
Dept: REHABILITATION | Facility: HOSPITAL | Age: 60
End: 2020-06-18
Payer: MEDICARE

## 2020-06-18 DIAGNOSIS — R29.898 DECREASED STRENGTH OF LOWER EXTREMITY: Primary | ICD-10-CM

## 2020-06-18 DIAGNOSIS — R52 TENDERNESS: ICD-10-CM

## 2020-06-18 DIAGNOSIS — M72.2 PLANTAR FASCIITIS, LEFT: ICD-10-CM

## 2020-06-18 DIAGNOSIS — R29.898 IMPAIRED FLEXIBILITY OF LOWER EXTREMITY: ICD-10-CM

## 2020-06-18 PROCEDURE — 97110 THERAPEUTIC EXERCISES: CPT

## 2020-06-25 ENCOUNTER — CLINICAL SUPPORT (OUTPATIENT)
Dept: REHABILITATION | Facility: HOSPITAL | Age: 60
End: 2020-06-25
Payer: MEDICARE

## 2020-06-25 DIAGNOSIS — M72.2 PLANTAR FASCIITIS, LEFT: ICD-10-CM

## 2020-06-25 DIAGNOSIS — R29.898 DECREASED STRENGTH OF LOWER EXTREMITY: ICD-10-CM

## 2020-06-25 DIAGNOSIS — R29.898 IMPAIRED FLEXIBILITY OF LOWER EXTREMITY: ICD-10-CM

## 2020-06-25 DIAGNOSIS — R52 TENDERNESS: ICD-10-CM

## 2020-06-25 PROCEDURE — 97110 THERAPEUTIC EXERCISES: CPT | Mod: CQ

## 2020-06-25 NOTE — PROGRESS NOTES
[facility] OUTPATIENT  Physical Therapy Daily Treatment Note     [facility]  [facility] Number: [id number]    Therapy Diagnosis:   No diagnosis found.  Physician: Jony Wang DPM    Visit Date: 6/25/2020    Physician Orders: PT Eval and Treat  Medical Diagnosis from Referral: M76.72 (ICD-10-CM) - Peroneal tendinitis of left lower extremity      Evaluation Date: 6/10/2020  Authorization Period Expiration: 12/31/2020  Current Certification Period: 6/10/2020 - 7/31/2020  Plan of Care Expiration: 7/31/2020    Visit # / Visits authorized: 4/ 40 (0/14 per POC) (Freq Ev + 2W6,1W2)         Total Visit count: 4    PTA Visit: 1      Re-assessment due by: 7/10/2020      Time In: 1045  Time Out: 1145        Precautions: Standard      Subjective     Pt reports: slight increase of painful symptoms today after hosting a get together at her house for father's day. .        She was compliant with home exercise program.    Response to previous treatment: No adverse affects.  Functional change: Non-remarkable      Pain: 0/10  Location: left feet        Objective       Paula received therapeutic exercises to develop strength, endurance, ROM and flexibility for 44 minutes including: TE and KT for edema control        · Nu Step L1  x 5 min  · Standing slant board gastroc stretch 30 sec x 2  · Seated hamstring stretch 60 sec   · Arch doming x 1 min x 2  · Toe yoga 30 sec x 2  · Ankle four way 10 x 2-held  · Towel scrunches 30 sec x 3  · Marbles In/Ev 20 x 1     Kinesiotaping NP  Patient was screened for use of kinesiotape and was cleared for use. Pt. received kinesiotaping on L foot/ankle edema control.  1. Has the patient ever had a reaction to the adhesive in bandaids? No:   2. Has the patient ever uses athletic/kinesiotape in the past? No  3. Is the patient hemodynamically impaired (PE, DVT, CHF, Kidney failure)? Yes/No: No  4. Can the PT/PTA apply the tape to your skin?  Yes/No: Yes     Patient was instructed on duration to wear the tape, proper drying techniques for the tape, and to remove the tape if he/she has any skin irritation: including, but not exclusive to, redness/itchiness/discomfort. Patient verbalized understanding of these instructions.          Paula received the following manual therapy techniques: STM were applied to the: LLE for 8 minutes, including: use of instrument for myofascial release       Paula participated in neuromuscular re-education activities to improve: Balance, Coordination and Proprioception for 4 minutes. The following activities were included:    · BAPS board L3 DF/PF/IN/EV/CW 30 sec x 2 each      Paula participated in dynamic functional therapeutic activities to improve functional performance for 0  minutes, including:      Paula participated in gait training to improve functional mobility and safety for 0  minutes, including:      Paula received the following direct contact modalities after being cleared for contraindications:N/A     Paula received the following supervised modalities after being cleared for contradictions N/A    Paula received hot pack for 0 minutes to N/A.    Paula received cold pack for 8 minutes to L ankle foot for edema control      Home Exercises Provided and Patient Education Provided     Education provided:   - Reviewed HEP and educated on mode frequency reps, and sets as well as when to stop TE.  Patient verbalzied understanding, performed return demonstration and with no adverse affects noted nor verbalized.        Written Home Exercises Provided: Patient instructed to cont prior HEP.  Exercises were reviewed and Paula was able to demonstrate them prior to the end of the session.  Paula demonstrated fair  understanding of the education provided.     See EMR under Media for exercises provided prior visit.    Assessment     Pateint participated with PT to address her ankle /foot deficits as well as for edema control.  She  has made improvements with improved control of L ankle/foot intrinsic/extrinsic musculature as evidenced by improved control with her toe yoga and arch doming exs.  She does still require VC for improved control with her IN/Ev exs to mitigate compensatory tibia movement although she was able to correct this following cues with theraband so HEP was issued for these exercises.      Paula is progressing well towards her goals.   Pt prognosis is Good.     Pt will continue to benefit from skilled outpatient physical therapy to address the deficits listed in the problem list box on initial evaluation, provide pt/family education and to maximize pt's level of independence in the home and community environment.     Pt's spiritual, cultural and educational needs considered and pt agreeable to plan of care and goals.       Anticipated barriers to physical therapy:  co morbidities, age, overweight       Goals:     STG  Weeks/Visits Date Established  Goal Status    1.Pt will report at worst pain of </= 6/10 to improve quality of life 4 weeks  6/10/2020    In progress 6/25/2020    2. Pt will report >/= 65/104 on ALAYNA to indicate improved functional mobility  4 weeks  6/10/2020        3.Pt will report she is able to stand for >/= 45 min to improve standing tolerance in the kitchen  4 weeks  6/10/2020        4.Pt will perform SLS on LLE for >/= 15 sec to decrease risk for falls  4 weeks  6/10/2020    In progress 6/25/2020    5.Pt will demonstrate compliance and independence with HEP to improve therapy outcomes  4 weeks  6/10/2020     In progress 6/25/2020        LTG Weeks/Visits Date Established  Goal Status    1.Pt will report at worst pain of </= 6/10 to improve quality of life 8 weeks 6/10/2020        2. Pt will report >/= 80/104 on ALAYNA to indicate improved functional mobility 8 weeks 6/10/2020           3.Pt will report she is able to stand for >/= 1 hour to improve standing tolerance in the kitchen  8 weeks 6/10/2020         4.Pt will perform SLS on LLE for >/= 20 sec to decrease risk for falls  8 weeks 6/10/2020        5.Pt will increase LLE strength to 5/5 to improve functional mobility  8 weeks 6/10/2020        6. Pt will navigate stairs with reciprocal gait pattern with 1 HR to improve stair navigation at home  8 weeks  6/10/2020                   Plan       Cont with current POC and progress as haylee f/u on effects KT with edema.         Charley Todd, PTA

## 2020-06-27 NOTE — PROGRESS NOTES
[facility] OUTPATIENT  Physical Therapy Daily Treatment Note     [facility]  Clinic Number: [id number]    Therapy Diagnosis:   Encounter Diagnoses   Name Primary?    Decreased strength of lower extremity Yes    Tenderness     Impaired flexibility of lower extremity     Plantar fasciitis, left      Physician: Jony Wang DPM    Visit Date: 6/30/2020    Physician Orders: PT Eval and Treat  Medical Diagnosis from Referral: M76.72 (ICD-10-CM) - Peroneal tendinitis of left lower extremity      Evaluation Date: 6/10/2020  Authorization Period Expiration: 12/31/2020  Current Certification Period: 6/10/2020 - 7/31/2020  Plan of Care Expiration: 7/31/2020    Visit # / Visits authorized: 5/ 40 (0/14 per POC) (Freq Ev + 2W6,1W2)         Total Visit count: 5    PTA Visit: 0/5      Re-assessment due by: 7/10/2020      Time In: 1115  Time Out: 1210        Precautions: Standard      Subjective     Pt reports: That she has been doing fine but has spent more time on her feet and currently has a 7/10 pain to her L foot as she points to her meta tarsal heads.        She was compliant with home exercise program.    Response to previous treatment: as noted  Functional change: Non-remarkable      Pain: 7/10  Location: left feet        Objective       Paula received therapeutic exercises to develop strength, endurance, ROM and flexibility for 34 minutes including: TE and KT for edema control        · Nu Step L1  x 5 min  · Standing slant board gastroc stretch 60 sec x 2  · Seated hamstring stretch 60 sec x 2  · Arch doming x 1 min x 2  · Toe yoga 30 sec x 2  · Ankle four way 10 x 2-held  · Towel scrunches 60 sec x 3  · Marbles In/Ev 20 x 2          Paula received the following manual therapy techniques: STM were applied to the: LLE for 8 minutes, including: use of instrument for myofascial release     Kinesiotaping   Patient was screened for use of kinesiotape and was  cleared for use. Pt. received kinesiotaping on L foot/ankle edema control.  1. Has the patient ever had a reaction to the adhesive in bandaids? No:   2. Has the patient ever uses athletic/kinesiotape in the past? No  3. Is the patient hemodynamically impaired (PE, DVT, CHF, Kidney failure)? Yes/No: No  4. Can the PT/PTA apply the tape to your skin? Yes/No: Yes     Patient was instructed on duration to wear the tape, proper drying techniques for the tape, and to remove the tape if he/she has any skin irritation: including, but not exclusive to, redness/itchiness/discomfort. Patient verbalized understanding of these instructions.          Paula participated in neuromuscular re-education activities to improve: Balance, Coordination and Proprioception for 0 minutes. The following activities were included:    · BAPS board L3 DF/PF/IN/EV/CW 30 sec x 2 each held      Paula participated in dynamic functional therapeutic activities to improve functional performance for 0  minutes, including:      Paula participated in gait training to improve functional mobility and safety for 0  minutes, including:      Paula received the following direct contact modalities after being cleared for contraindications:Ultrasound:  Paula received ultrasound with 3MHz to manage pain and inflammation at 100 % duty cycle applied to the 1.5W/cm2  for a duration of 8 minutes to her L foot at her meta tarsal heads. Patient tolerated treatment well without adverse effects. Therapist was in attendance throughout intervention.     Paula received the following supervised modalities after being cleared for contradictions N/A    Paula received hot pack for 0 minutes to N/A.    Paula received cold pack for 8 minutes to L ankle foot for edema control      Home Exercises Provided and Patient Education Provided     Education provided:   - Reviewed HEP and educated on mode frequency reps, and sets as well as when to stop TE.  Patient verbalzied understanding,  performed return demonstration and with no adverse affects noted nor verbalized.        Written Home Exercises Provided: Patient instructed to cont prior HEP.  Exercises were reviewed and Paula was able to demonstrate them prior to the end of the session.  Paula demonstrated fair  understanding of the education provided.     See EMR under Media for exercises provided prior visit.    Assessment     Patient participated with PT to address her current deficits as well as L ankle edema and acute pain.  She was able to perform her noted TE with cueing for improved quality as well as received KT for edema control and U/S to her L foot at her metatarsal heads to facilitate healing of which she responded well as she had reduced pain upon completion of Rx.  She is expected to cont to improve and progress with cont PT RX to obtain her est. goals.          Paula is progressing well towards her goals.   Pt prognosis is Good.     Pt will continue to benefit from skilled outpatient physical therapy to address the deficits listed in the problem list box on initial evaluation, provide pt/family education and to maximize pt's level of independence in the home and community environment.     Pt's spiritual, cultural and educational needs considered and pt agreeable to plan of care and goals.       Anticipated barriers to physical therapy:  co morbidities, age, overweight       Goals:     STG  Weeks/Visits Date Established  Goal Status    1.Pt will report at worst pain of </= 6/10 to improve quality of life 4 weeks  6/10/2020    In progress 6/30/2020    2. Pt will report >/= 65/104 on ALAYNA to indicate improved functional mobility  4 weeks  6/10/2020        3.Pt will report she is able to stand for >/= 45 min to improve standing tolerance in the kitchen  4 weeks  6/10/2020        4.Pt will perform SLS on LLE for >/= 15 sec to decrease risk for falls  4 weeks  6/10/2020    In progress 6/30/2020    5.Pt will demonstrate compliance and  independence with HEP to improve therapy outcomes  4 weeks  6/10/2020     In progress 6/30/2020        LTG Weeks/Visits Date Established  Goal Status    1.Pt will report at worst pain of </= 6/10 to improve quality of life 8 weeks 6/10/2020        2. Pt will report >/= 80/104 on ALAYNA to indicate improved functional mobility 8 weeks 6/10/2020           3.Pt will report she is able to stand for >/= 1 hour to improve standing tolerance in the kitchen  8 weeks 6/10/2020        4.Pt will perform SLS on LLE for >/= 20 sec to decrease risk for falls  8 weeks 6/10/2020        5.Pt will increase LLE strength to 5/5 to improve functional mobility  8 weeks 6/10/2020        6. Pt will navigate stairs with reciprocal gait pattern with 1 HR to improve stair navigation at home  8 weeks  6/10/2020                   Plan       Cont with current POC and progress as haylee f/u on effects KT with edema and affects of U/S.          Aquiles Pickens, PT

## 2020-06-30 ENCOUNTER — CLINICAL SUPPORT (OUTPATIENT)
Dept: REHABILITATION | Facility: HOSPITAL | Age: 60
End: 2020-06-30
Payer: MEDICARE

## 2020-06-30 ENCOUNTER — DOCUMENTATION ONLY (OUTPATIENT)
Dept: REHABILITATION | Facility: HOSPITAL | Age: 60
End: 2020-06-30

## 2020-06-30 DIAGNOSIS — R29.898 IMPAIRED FLEXIBILITY OF LOWER EXTREMITY: ICD-10-CM

## 2020-06-30 DIAGNOSIS — R29.898 DECREASED STRENGTH OF LOWER EXTREMITY: Primary | ICD-10-CM

## 2020-06-30 DIAGNOSIS — R52 TENDERNESS: ICD-10-CM

## 2020-06-30 DIAGNOSIS — M72.2 PLANTAR FASCIITIS, LEFT: ICD-10-CM

## 2020-06-30 PROCEDURE — 97110 THERAPEUTIC EXERCISES: CPT

## 2020-06-30 PROCEDURE — 97140 MANUAL THERAPY 1/> REGIONS: CPT

## 2020-06-30 PROCEDURE — 97035 APP MDLTY 1+ULTRASOUND EA 15: CPT

## 2020-07-01 NOTE — PROGRESS NOTES
[facility] OUTPATIENT  Physical Therapy Daily Treatment Note     Name: Paula ObregonNatanael  Clinic Number: [id number]    Therapy Diagnosis:   No diagnosis found.  Physician: Jony Wang DPM    Visit Date: 7/2/2020    Physician Orders: PT Eval and Treat  Medical Diagnosis from Referral: M76.72 (ICD-10-CM) - Peroneal tendinitis of left lower extremity      Evaluation Date: 6/10/2020  Authorization Period Expiration: 12/31/2020  Current Certification Period: 6/10/2020 - 7/31/2020  Plan of Care Expiration: 7/31/2020    Visit # / Visits authorized: 6/ 40 (0/14 per POC) (Freq Ev + 2W6,1W2)         Total Visit count: 6    PTA Visit: 0/5      Re-assessment due by: 7/10/2020      Time In: 0915  Time Out: 1005        Precautions: Standard      Subjective     Pt reports: That she has been doing fine and only has discomfort to her L LE.  She adds that she has noted that she has had less swelling with the KT in place.  She also states no pain to plantar surface of foot at her metatarsal heads.        She was compliant with home exercise program.    Response to previous treatment: as noted  Functional change: Non-remarkable      Pain: 0/10  Location: left feet        Objective       Paula received therapeutic exercises to develop strength, endurance, ROM and flexibility for 35 minutes including: TE and KT for edema control        · Nu Step L1  x 5 min  · Standing slant board gastroc stretch 60 sec x 2  · Seated hamstring stretch 60 sec x 2  · Arch doming x 1 min x 2  · Toe yoga 30 sec x 2  · Ankle four way with OTB 10 x 2  · Towel scrunches 60 sec x 3  · Marbles In/Ev 20 x 2          Paula received the following manual therapy techniques: STM were applied to the: LLE for 0 minutes, including: use of instrument for myofascial release     Kinesiotaping   Patient was screened for use of kinesiotape and was cleared for use. Pt. received kinesiotaping on L foot/ankle edema  control.  1. Has the patient ever had a reaction to the adhesive in bandaids? No:   2. Has the patient ever uses athletic/kinesiotape in the past? No  3. Is the patient hemodynamically impaired (PE, DVT, CHF, Kidney failure)? Yes/No: No  4. Can the PT/PTA apply the tape to your skin? Yes/No: Yes     Patient was instructed on duration to wear the tape, proper drying techniques for the tape, and to remove the tape if he/she has any skin irritation: including, but not exclusive to, redness/itchiness/discomfort. Patient verbalized understanding of these instructions.          Paula participated in neuromuscular re-education activities to improve: Balance, Coordination and Proprioception for 8 minutes. The following activities were included:    · BAPS board L3 DF/PF/IN/EV/CW 30 sec x 2 each held      Paula participated in dynamic functional therapeutic activities to improve functional performance for 0  minutes, including:      Paula participated in gait training to improve functional mobility and safety for 0  minutes, including:      Paula received the following direct contact modalities after being cleared for contraindications:Ultrasound:  Paula received ultrasound with 3MHz to manage pain and inflammation at 100 % duty cycle applied to the 1.5W/cm2  for a duration of 8 minutes to her L foot at her meta tarsal heads. Patient tolerated treatment well without adverse effects. Therapist was in attendance throughout intervention.     Paula received the following supervised modalities after being cleared for contradictions N/A    Paula received hot pack for 0 minutes to N/A.    Paula received cold pack for 0 minutes to L ankle foot for edema control      Home Exercises Provided and Patient Education Provided     Education provided:   - Reviewed HEP and educated on mode frequency reps, and sets as well as when to stop TE.  Patient verbalzied understanding, performed return demonstration and with no adverse affects noted  nor verbalized.        Written Home Exercises Provided: Patient instructed to cont prior HEP.  Exercises were reviewed and Paula was able to demonstrate them prior to the end of the session.  Paula demonstrated fair  understanding of the education provided.     See EMR under Media for exercises provided prior visit.    Assessment     Patient has participated with PT to address her current deficits regarding pain, edema strength activity haylee and function.  She was able to haylee her noted TE without c/o but did require cueing with BAPS board and ankle four way exs  to mitigate compensatory motions and for improved quality and performance.  She is expected to cont to improve and progress with cont PT Rx in order to obtain her established goals.          Paula is progressing well towards her goals.   Pt prognosis is Good.     Pt will continue to benefit from skilled outpatient physical therapy to address the deficits listed in the problem list box on initial evaluation, provide pt/family education and to maximize pt's level of independence in the home and community environment.     Pt's spiritual, cultural and educational needs considered and pt agreeable to plan of care and goals.       Anticipated barriers to physical therapy:  co morbidities, age, overweight       Goals:     STG  Weeks/Visits Date Established  Goal Status    1.Pt will report at worst pain of </= 6/10 to improve quality of life 4 weeks  6/10/2020    In progress 7/1/2020    2. Pt will report >/= 65/104 on ALAYNA to indicate improved functional mobility  4 weeks  6/10/2020        3.Pt will report she is able to stand for >/= 45 min to improve standing tolerance in the kitchen  4 weeks  6/10/2020        4.Pt will perform SLS on LLE for >/= 15 sec to decrease risk for falls  4 weeks  6/10/2020    In progress 7/1/2020    5.Pt will demonstrate compliance and independence with HEP to improve therapy outcomes  4 weeks  6/10/2020     In progress 7/1/2020         LTG Weeks/Visits Date Established  Goal Status    1.Pt will report at worst pain of </= 6/10 to improve quality of life 8 weeks 6/10/2020        2. Pt will report >/= 80/104 on ALAYNA to indicate improved functional mobility 8 weeks 6/10/2020           3.Pt will report she is able to stand for >/= 1 hour to improve standing tolerance in the kitchen  8 weeks 6/10/2020        4.Pt will perform SLS on LLE for >/= 20 sec to decrease risk for falls  8 weeks 6/10/2020        5.Pt will increase LLE strength to 5/5 to improve functional mobility  8 weeks 6/10/2020        6. Pt will navigate stairs with reciprocal gait pattern with 1 HR to improve stair navigation at home  8 weeks  6/10/2020                   Plan       Cont with current POC and progress as haylee f/u on effects KT with edema        Aquiles Pickens, PT

## 2020-07-02 ENCOUNTER — CLINICAL SUPPORT (OUTPATIENT)
Dept: REHABILITATION | Facility: HOSPITAL | Age: 60
End: 2020-07-02
Payer: COMMERCIAL

## 2020-07-02 DIAGNOSIS — R52 TENDERNESS: ICD-10-CM

## 2020-07-02 DIAGNOSIS — M72.2 PLANTAR FASCIITIS, LEFT: ICD-10-CM

## 2020-07-02 DIAGNOSIS — R29.898 DECREASED STRENGTH OF LOWER EXTREMITY: Primary | ICD-10-CM

## 2020-07-02 DIAGNOSIS — R29.898 IMPAIRED FLEXIBILITY OF LOWER EXTREMITY: ICD-10-CM

## 2020-07-02 PROCEDURE — 97110 THERAPEUTIC EXERCISES: CPT

## 2020-07-02 PROCEDURE — 97112 NEUROMUSCULAR REEDUCATION: CPT

## 2020-07-07 ENCOUNTER — CLINICAL SUPPORT (OUTPATIENT)
Dept: REHABILITATION | Facility: HOSPITAL | Age: 60
End: 2020-07-07
Payer: COMMERCIAL

## 2020-07-07 DIAGNOSIS — R29.898 IMPAIRED FLEXIBILITY OF LOWER EXTREMITY: ICD-10-CM

## 2020-07-07 DIAGNOSIS — M72.2 PLANTAR FASCIITIS, LEFT: ICD-10-CM

## 2020-07-07 DIAGNOSIS — R52 TENDERNESS: ICD-10-CM

## 2020-07-07 DIAGNOSIS — R29.898 DECREASED STRENGTH OF LOWER EXTREMITY: ICD-10-CM

## 2020-07-07 PROCEDURE — 97110 THERAPEUTIC EXERCISES: CPT | Mod: CQ

## 2020-07-07 NOTE — PROGRESS NOTES
[facility] OUTPATIENT  Physical Therapy Daily Treatment Note     [facility]  [facility] Number: [id number]    Therapy Diagnosis:   Encounter Diagnoses   Name Primary?    Decreased strength of lower extremity     Tenderness     Impaired flexibility of lower extremity     Plantar fasciitis, left      Physician: Jony Wang DPM    Visit Date: 7/7/2020    Physician Orders: PT Eval and Treat  Medical Diagnosis from Referral: M76.72 (ICD-10-CM) - Peroneal tendinitis of left lower extremity      Evaluation Date: 6/10/2020  Authorization Period Expiration: 12/31/2020  Current Certification Period: 6/10/2020 - 7/31/2020  Plan of Care Expiration: 7/31/2020    Visit # / Visits authorized: 7/ 40 (0/14 per POC) (Freq Ev + 2W6,1W2)         Total Visit count: 7    PTA Visit: 1/5      Re-assessment due by: 7/10/2020      Time In: 1200  Time Out: 1300        Precautions: Standard      Subjective     Pt reports: That she has been doing fine and only has discomfort to her L LE.  She adds that she has noted that she has had less swelling with the KT in place.  She also states no pain to plantar surface of foot at her metatarsal heads.        She was compliant with home exercise program.    Response to previous treatment: as noted  Functional change: Non-remarkable      Pain: 3/10  Location: left feet        Objective       Paula received therapeutic exercises to develop strength, endurance, ROM and flexibility for 35 minutes including: TE and KT for edema control        · Nu Step L1  x 5 min  · Standing slant board gastroc stretch 60 sec x 2  · Seated hamstring stretch 60 sec x 2  · Arch doming x 1 min x 2  · Toe yoga 30 sec x 2  · Ankle four way with OTB 10 x 2  · Towel scrunches 60 sec x 3  · Marbles In/Ev 20 x 2          Paula received the following manual therapy techniques: STM were applied to the: LLE for 5 minutes, including: use of instrument for myofascial  release     Kinesiotaping   Patient was screened for use of kinesiotape and was cleared for use. Pt. received kinesiotaping on L foot/ankle edema control.  1. Has the patient ever had a reaction to the adhesive in bandaids? No:   2. Has the patient ever uses athletic/kinesiotape in the past? No  3. Is the patient hemodynamically impaired (PE, DVT, CHF, Kidney failure)? Yes/No: No  4. Can the PT/PTA apply the tape to your skin? Yes/No: Yes     Patient was instructed on duration to wear the tape, proper drying techniques for the tape, and to remove the tape if he/she has any skin irritation: including, but not exclusive to, redness/itchiness/discomfort. Patient verbalized understanding of these instructions.          Paula participated in neuromuscular re-education activities to improve: Balance, Coordination and Proprioception for 8 minutes. The following activities were included:    · BAPS board L3 DF/PF/IN/EV/CW 30 sec x 2 each held      Paula participated in dynamic functional therapeutic activities to improve functional performance for 0  minutes, including:      Paula participated in gait training to improve functional mobility and safety for 0  minutes, including:      Paula received the following direct contact modalities after being cleared for contraindications:Ultrasound:  Paula received ultrasound with 3MHz to manage pain and inflammation at 100 % duty cycle applied to the 1.5W/cm2  for a duration of 8 minutes to her L foot at her meta tarsal heads. Patient tolerated treatment well without adverse effects. Therapist was in attendance throughout intervention.     Paula received the following supervised modalities after being cleared for contradictions N/A    Paula received hot pack for 0 minutes to N/A.    Paula received cold pack for 0 minutes to L ankle foot for edema control      Home Exercises Provided and Patient Education Provided     Education provided:   - Reviewed HEP and educated on mode frequency  reps, and sets as well as when to stop TE.  Patient verbalzied understanding, performed return demonstration and with no adverse affects noted nor verbalized.        Written Home Exercises Provided: Patient instructed to cont prior HEP.  Exercises were reviewed and Paula was able to demonstrate them prior to the end of the session.  Paula demonstrated fair  understanding of the education provided.     See EMR under Media for exercises provided prior visit.    Assessment     Patient has participated with PT to address her current deficits regarding pain, edema strength activity haylee and function.  She was able to haylee her noted TE without c/o but did require cueing with BAPS board and ankle four way exs  to mitigate compensatory motions and for improved quality and performance. KT tape reapplied at patient's request.  She is expected to cont to improve and progress with cont PT Rx in order to obtain her established goals.          Paula is progressing well towards her goals.   Pt prognosis is Good.     Pt will continue to benefit from skilled outpatient physical therapy to address the deficits listed in the problem list box on initial evaluation, provide pt/family education and to maximize pt's level of independence in the home and community environment.     Pt's spiritual, cultural and educational needs considered and pt agreeable to plan of care and goals.       Anticipated barriers to physical therapy:  co morbidities, age, overweight       Goals:     STG  Weeks/Visits Date Established  Goal Status    1.Pt will report at worst pain of </= 6/10 to improve quality of life 4 weeks  6/10/2020    In progress 7/7/2020    2. Pt will report >/= 65/104 on ALAYNA to indicate improved functional mobility  4 weeks  6/10/2020        3.Pt will report she is able to stand for >/= 45 min to improve standing tolerance in the kitchen  4 weeks  6/10/2020        4.Pt will perform SLS on LLE for >/= 15 sec to decrease risk for falls  4  weeks  6/10/2020    In progress 7/7/2020    5.Pt will demonstrate compliance and independence with HEP to improve therapy outcomes  4 weeks  6/10/2020     In progress 7/7/2020        LTG Weeks/Visits Date Established  Goal Status    1.Pt will report at worst pain of </= 6/10 to improve quality of life 8 weeks 6/10/2020        2. Pt will report >/= 80/104 on ALAYAN to indicate improved functional mobility 8 weeks 6/10/2020           3.Pt will report she is able to stand for >/= 1 hour to improve standing tolerance in the kitchen  8 weeks 6/10/2020        4.Pt will perform SLS on LLE for >/= 20 sec to decrease risk for falls  8 weeks 6/10/2020        5.Pt will increase LLE strength to 5/5 to improve functional mobility  8 weeks 6/10/2020        6. Pt will navigate stairs with reciprocal gait pattern with 1 HR to improve stair navigation at home  8 weeks  6/10/2020                   Plan       Cont with current POC and progress as haylee f/u on effects KT with edema        Charley Todd, PTA

## 2020-07-09 ENCOUNTER — OFFICE VISIT (OUTPATIENT)
Dept: PRIMARY CARE CLINIC | Facility: CLINIC | Age: 60
End: 2020-07-09
Payer: COMMERCIAL

## 2020-07-09 VITALS
OXYGEN SATURATION: 98 % | TEMPERATURE: 98 F | DIASTOLIC BLOOD PRESSURE: 72 MMHG | HEART RATE: 87 BPM | RESPIRATION RATE: 16 BRPM | SYSTOLIC BLOOD PRESSURE: 171 MMHG

## 2020-07-09 DIAGNOSIS — R09.81 NASAL CONGESTION: ICD-10-CM

## 2020-07-09 PROCEDURE — 99203 OFFICE O/P NEW LOW 30 MIN: CPT | Mod: S$GLB,,, | Performed by: EMERGENCY MEDICINE

## 2020-07-09 PROCEDURE — 99203 PR OFFICE/OUTPT VISIT, NEW, LEVL III, 30-44 MIN: ICD-10-PCS | Mod: S$GLB,,, | Performed by: EMERGENCY MEDICINE

## 2020-07-09 PROCEDURE — 3077F PR MOST RECENT SYSTOLIC BLOOD PRESSURE >= 140 MM HG: ICD-10-PCS | Mod: CPTII,S$GLB,, | Performed by: EMERGENCY MEDICINE

## 2020-07-09 PROCEDURE — 3078F DIAST BP <80 MM HG: CPT | Mod: CPTII,S$GLB,, | Performed by: EMERGENCY MEDICINE

## 2020-07-09 PROCEDURE — 3077F SYST BP >= 140 MM HG: CPT | Mod: CPTII,S$GLB,, | Performed by: EMERGENCY MEDICINE

## 2020-07-09 PROCEDURE — U0003 INFECTIOUS AGENT DETECTION BY NUCLEIC ACID (DNA OR RNA); SEVERE ACUTE RESPIRATORY SYNDROME CORONAVIRUS 2 (SARS-COV-2) (CORONAVIRUS DISEASE [COVID-19]), AMPLIFIED PROBE TECHNIQUE, MAKING USE OF HIGH THROUGHPUT TECHNOLOGIES AS DESCRIBED BY CMS-2020-01-R: HCPCS

## 2020-07-09 PROCEDURE — 3078F PR MOST RECENT DIASTOLIC BLOOD PRESSURE < 80 MM HG: ICD-10-PCS | Mod: CPTII,S$GLB,, | Performed by: EMERGENCY MEDICINE

## 2020-07-09 NOTE — PROGRESS NOTES
Subjective:        Time seen by provider: 12:09 PM on 07/09/2020    Paula Zamora is a 60 y.o. female with PMHx of HTN who presents for an evaluation of possible COVID-19. The patient c/o cough, as well as a transient episode of lightheadedness yesterday. She denies fever, numbness, weakness or any other symptoms at this time. No pertinent PSHx. Patient is a former smoker.    Review of Systems   Constitutional: Negative for activity change, appetite change, fatigue and fever.   HENT: Negative for congestion, rhinorrhea and sore throat.    Respiratory: Positive for cough. Negative for chest tightness, shortness of breath and wheezing.    Cardiovascular: Negative for chest pain and palpitations.   Gastrointestinal: Negative for diarrhea, nausea and vomiting.   Musculoskeletal: Negative for arthralgias and myalgias.   Skin: Negative for rash.   Neurological: Positive for light-headedness (resolved). Negative for weakness, numbness and headaches.       Objective:      Physical Exam  Vitals signs and nursing note reviewed.   Constitutional:       General: She is not in acute distress.     Appearance: She is well-developed. She is not diaphoretic.   HENT:      Head: Normocephalic and atraumatic.      Nose: Nose normal.   Eyes:      Conjunctiva/sclera: Conjunctivae normal.   Neck:      Musculoskeletal: Normal range of motion.   Cardiovascular:      Rate and Rhythm: Normal rate and regular rhythm.      Heart sounds: Normal heart sounds. No murmur.   Pulmonary:      Effort: No respiratory distress.      Breath sounds: Normal breath sounds. No wheezing.   Musculoskeletal: Normal range of motion.   Skin:     General: Skin is warm and dry.   Neurological:      Mental Status: She is alert and oriented to person, place, and time.         Assessment:       1. Suspected COVID-19 Virus infection  Plan:       1. Suspected COVID-19 Virus infection  - COVID-19 Routine Screening  - patient has clear bilateral breath sounds.  She  has no acute respiratory distress.  I do not suspect any underlying bacterial infection such as bacterial bronchitis or pneumonia.  She is very well-appearing and denies any symptoms of lightheadedness at this time.  Patient will follow-up with PCP pending COVID-19 test results as agreed.  2. Discharge home and await results.   3. Return to clinic or ED for new or worsening symptoms.   4. Follow-up with PCP as needed.     Scribe Attestation:   I, Katy Mendoza, am scribing for, and in the presence of, LUCRECIA Mendoza. I performed the above scribed service and the documentation accurately describes the services I performed. I attest to the accuracy of the note.    I, LUCRECIA Mendoza, personally performed the services described in this documentation. All medical record entries made by the scribe were at my direction and in my presence.  I have reviewed the chart and agree that the record reflects my personal performance and is accurate and complete. LUCRECIA Mendoza.  1:48 PM 07/09/2020

## 2020-07-09 NOTE — PATIENT INSTRUCTIONS

## 2020-07-10 LAB — SARS-COV-2 RNA RESP QL NAA+PROBE: NOT DETECTED

## 2020-07-10 NOTE — PROGRESS NOTES
[facility]  Physical Therapy Daily Treatment Note/Reassessment     Name: Paula ObregonNatanael  Clinic Number: [id number]    Therapy Diagnosis:   No diagnosis found.  Physician: Jony Wang DPM    Visit Date: 7/14/2020    Physician Orders: PT Eval and Treat  Medical Diagnosis from Referral: M76.72 (ICD-10-CM) - Peroneal tendinitis of left lower extremity      Evaluation Date: 6/10/2020  Authorization Period Expiration: 12/31/2020  Current Certification Period: 6/10/2020 - 7/31/2020  Plan of Care Expiration: 7/31/2020    Visit # / Visits authorized: 8/ 40 (0/14 per POC) (Freq Ev + 2W6,1W2)         Total Visit count: 8    PTA Visit: 0/5      Re-assessment due by: 8/14/2020      Time In: 1000  Time Out: 1045        Precautions: Standard      Subjective     Pt reports: She has been doing ok but has had increased activity with increased activity due to her having work done I her home.  She has had to pack and move boxes as well as spend increased amount of time on her feet.  She states her worst pain over the last week was a 9/10.  Today she states her pain is a 6/10 and overall her function has improved with improved ability to stand for longer duration as well as improved community mobility and ability to grocery shop.          She was compliant with home exercise program.    Response to previous treatment: as noted  Functional change: as above.        Pain: 6/10  Location: left feet        Objective       Paula received therapeutic exercises to develop strength, endurance, ROM and flexibility for 13 minutes including: TE and KT for edema control    Re-assessment due by: 8/14/2020      · Nu Step L1  x 5 min  · Ankle four way with GTB 10 x 2  · Standing slant board gastroc stretch 60 sec x 2 held  · Seated hamstring stretch 60 sec x 2 held  · Arch doming x 1 min x 2 held  · Toe yoga 30 sec x 2 held  · Towel scrunches 60 sec x 3 held  · Marbles In/Ev 20 x 2  held          Paula received the following manual therapy techniques: STM were applied to the: LLE for 6 minutes, including: use of instrument for myofascial release (No STM this day/ did apply KT)    Kinesiotaping   Patient was screened for use of kinesiotape and was cleared for use. Pt. received kinesiotaping on L foot/ankle edema control.  1. Has the patient ever had a reaction to the adhesive in bandaids? No:   2. Has the patient ever uses athletic/kinesiotape in the past? No  3. Is the patient hemodynamically impaired (PE, DVT, CHF, Kidney failure)? Yes/No: No  4. Can the PT/PTA apply the tape to your skin? Yes/No: Yes     Patient was instructed on duration to wear the tape, proper drying techniques for the tape, and to remove the tape if he/she has any skin irritation: including, but not exclusive to, redness/itchiness/discomfort. Patient verbalized understanding of these instructions.          Paula participated in neuromuscular re-education activities to improve: Balance, Coordination and Proprioception for 9 minutes. The following activities were included:    · BAPS board L3 DF/PF/IN/EV/CW 30 sec x 2 each held      Paula participated in dynamic functional therapeutic activities/ Physical Performance Testing to improve functional performance for 15  minutes, including:    Balance:     SLS:  L foot T1:  04.61 sec      T2:  11.93 sec      T3:  16.63 sec      Functional score:        ALAYNA: 64/104      Paula participated in gait training to improve functional mobility and safety for 0  minutes, including:      Paula received the following direct contact modalities after being cleared for contraindications:Ultrasound:  Paula received ultrasound with 3MHz to manage pain and inflammation at 100 % duty cycle applied to the 1.5W/cm2  for a duration of 0 minutes to her L foot at her meta tarsal heads. Patient tolerated treatment well without adverse effects. Therapist was in attendance throughout intervention.      Paula received the following supervised modalities after being cleared for contradictions N/A    Paula received hot pack for 0 minutes to N/A.    Paula received cold pack for 0 minutes to L ankle foot for edema control      Home Exercises Provided and Patient Education Provided     Education provided:   - Reviewed HEP and educated on mode frequency reps, and sets as well as when to stop TE.  Patient verbalzied understanding, performed return demonstration and with no adverse affects noted nor verbalized.        Written Home Exercises Provided: Patient instructed to cont prior HEP.  Exercises were reviewed and Paula was able to demonstrate them prior to the end of the session.  Paula demonstrated fair  understanding of the education provided.     See EMR under Media for exercises provided prior visit.    ReAssessment     Patient has participated with PT to address her pain, edema, balance and functional mob.  She currently reports improved function with increased ability to haylee increased activity however she does have increased exacerbations of symptoms following her increased functional activity.   She has responded well to KT tape in order to mitigate her edema and pain.to her L ankle foot.  Additionally, she has made improvements in her L LE SLS times but still remains challenged and with an overall decrease in time.  Patent currently reports a functional level of 61.53% as per the ALAYNA and has met STG 5 while STG 1, 2, 3, and 4 are in progress.  Patient is expected to cont to improve and progress with PT to mitigate her edema and pain as well as improve her strength, balance and function.       Paula is progressing well towards her goals.   Pt prognosis is Good.     Pt will continue to benefit from skilled outpatient physical therapy to address the deficits listed in the problem list box on initial evaluation, provide pt/family education and to maximize pt's level of independence in the home and community  environment.     Pt's spiritual, cultural and educational needs considered and pt agreeable to plan of care and goals.       Anticipated barriers to physical therapy:  co morbidities, age, overweight       Goals:     STG  Weeks/Visits Date Established  Goal Status    1.Pt will report at worst pain of </= 6/10 to improve quality of life 4 weeks  6/10/2020    In progress 7/10/2020    2. Pt will report >/= 65/104 on ALAYNA to indicate improved functional mobility  4 weeks  6/10/2020    In progress 7/14/2020    3.Pt will report she is able to stand for >/= 45 min to improve standing tolerance in the kitchen  4 weeks  6/10/2020    In progress 7/14/2020    4.Pt will perform SLS on LLE for >/= 15 sec to decrease risk for falls  4 weeks  6/10/2020    In progress 7/10/2020    5.Pt will demonstrate compliance and independence with HEP to improve therapy outcomes  4 weeks  6/10/2020    Met 7/14/2020      LTG Weeks/Visits Date Established  Goal Status    1.Pt will report at worst pain of </= 6/10 to improve quality of life 8 weeks 6/10/2020        2. Pt will report >/= 80/104 on ALAYNA to indicate improved functional mobility 8 weeks 6/10/2020           3.Pt will report she is able to stand for >/= 1 hour to improve standing tolerance in the kitchen  8 weeks 6/10/2020        4.Pt will perform SLS on LLE for >/= 20 sec to decrease risk for falls  8 weeks 6/10/2020        5.Pt will increase LLE strength to 5/5 to improve functional mobility  8 weeks 6/10/2020        6. Pt will navigate stairs with reciprocal gait pattern with 1 HR to improve stair navigation at home  8 weeks  6/10/2020                   Plan       Cont with current POC and progress as haylee progress SLS activity as haylee.          Aquiles Pickens, PT

## 2020-07-14 ENCOUNTER — CLINICAL SUPPORT (OUTPATIENT)
Dept: REHABILITATION | Facility: HOSPITAL | Age: 60
End: 2020-07-14
Payer: COMMERCIAL

## 2020-07-14 DIAGNOSIS — R29.898 IMPAIRED FLEXIBILITY OF LOWER EXTREMITY: ICD-10-CM

## 2020-07-14 DIAGNOSIS — R52 TENDERNESS: ICD-10-CM

## 2020-07-14 DIAGNOSIS — M72.2 PLANTAR FASCIITIS, LEFT: ICD-10-CM

## 2020-07-14 DIAGNOSIS — R29.898 DECREASED STRENGTH OF LOWER EXTREMITY: Primary | ICD-10-CM

## 2020-07-14 PROCEDURE — 97750 PHYSICAL PERFORMANCE TEST: CPT

## 2020-07-14 PROCEDURE — 97110 THERAPEUTIC EXERCISES: CPT

## 2020-07-14 PROCEDURE — 97112 NEUROMUSCULAR REEDUCATION: CPT

## 2020-07-19 NOTE — PROGRESS NOTES
[facility] OUTPATIENT  Physical Therapy Daily Treatment Note     [facility]  Clinic Number: [id number]    Therapy Diagnosis:   Encounter Diagnoses   Name Primary?    Decreased strength of lower extremity Yes    Tenderness     Impaired flexibility of lower extremity     Plantar fasciitis, left      Physician: Jony Wang DPM    Visit Date: 7/21/2020    Physician Orders: PT Eval and Treat  Medical Diagnosis from Referral: M76.72 (ICD-10-CM) - Peroneal tendinitis of left lower extremity      Evaluation Date: 6/10/2020  Authorization Period Expiration: 12/31/2020  Current Certification Period: 6/10/2020 - 7/31/2020  Plan of Care Expiration: 7/31/2020    Visit # / Visits authorized: 9/ 40 (Freq Ev + 2W6,1W2)         Total Visit count: 9    PTA Visit: 0/5      Re-assessment due by: 8/14/2020      Time In: 0955  Time Out: 1045        Precautions: Standard      Subjective     Pt reports: She has been doing well, and had increased activity this past weekend with lifting and moving tasks.  She adds that she removed her KT on Friday and it was noticeable the difference in her edema.  Today she reports no pain only some discomfort.         She was compliant with home exercise program.    Response to previous treatment: as noted  Functional change: as above.        Pain: 6/10  Location: left feet        Objective       Paula received therapeutic exercises to develop strength, endurance, ROM and flexibility for 30 minutes including: TE and KT for edema control      · Nu Step L1  x 5 min held  · +recumbent bike x 7 min L3  · Ankle four way with GTB 15 x 2  · Standing slant board gastroc stretch 60 sec x 2  · Seated hamstring stretch 60 sec x 2  · + heel raises on half foam 10 x 2  ·   · Arch doming x 1 min x 2 held  · Toe yoga 30 sec x 2 held  · Towel scrunches 60 sec x 3 held  · Marbles In/Ev 20 x 2 held          Paula received the following manual therapy  techniques: Joint mobs were applied to the: L ankle/foot for ankle DF/PF, IN/EV as well as to the A/P mobs to the distal tib fib joint for improved accessory motion and to facilitate DF/PF x 13 min.            Kinesiotaping ( for edema control)  Patient was screened for use of kinesiotape and was cleared for use. Pt. received kinesiotaping on L foot/ankle edema control.  1. Has the patient ever had a reaction to the adhesive in bandaids? No:   2. Has the patient ever uses athletic/kinesiotape in the past? No  3. Is the patient hemodynamically impaired (PE, DVT, CHF, Kidney failure)? Yes/No: No  4. Can the PT/PTA apply the tape to your skin? Yes/No: Yes     Patient was instructed on duration to wear the tape, proper drying techniques for the tape, and to remove the tape if he/she has any skin irritation: including, but not exclusive to, redness/itchiness/discomfort. Patient verbalized understanding of these instructions.          Paula participated in neuromuscular re-education activities to improve: Balance, Coordination and Proprioception for 0 minutes. The following activities were included:    · BAPS board L3 DF/PF/IN/EV/CW 30 sec x 2 each held      Paula participated in dynamic functional therapeutic activities/ Physical Performance Testing to improve functional performance for 0 minutes, including:      Paula participated in gait training to improve functional mobility and safety for 0  minutes, including:      Paula received the following direct contact modalities after being cleared for contraindications:Ultrasound:  Paula received ultrasound with 3MHz to manage pain and inflammation at 100 % duty cycle applied to the 1.5W/cm2  for a duration of 0 minutes to her L foot at her meta tarsal heads. Patient tolerated treatment well without adverse effects. Therapist was in attendance throughout intervention.     Paula received the following supervised modalities after being cleared for contradictions N/A    Paula  received hot pack for 0 minutes to N/A.    Paula received cold pack for 0 minutes to L ankle foot for edema control      Home Exercises Provided and Patient Education Provided     Education provided:   - Reviewed HEP and educated on mode frequency reps, and sets as well as when to stop TE.  Patient verbalzied understanding, performed return demonstration and with no adverse affects noted nor verbalized.        Written Home Exercises Provided: Patient instructed to cont prior HEP.  Exercises were reviewed and Paula was able to demonstrate them prior to the end of the session.  Paula demonstrated fair  understanding of the education provided.     See EMR under Media for exercises provided prior visit.    Assessment     Patient participated with PT to address her edema, ROM, strength activity haylee and functional mob.  She was able to perform her not Rs with minimal cueing with ankle four way exs in order to mitigate tibial motions.  She was able to haylee noted mobs with only minor c/o of discomfort due to hand positions.  Moreover she was able to haylee her noted  Progresses exs with minimal discomfort with added heel raises which subsided with increased UE support.  Patient is expected to improve and progress with cont PT Rx in order to obtain her established goals and to return to pain reduced/free mobility.        Paula is progressing well towards her goals.   Pt prognosis is Good.     Pt will continue to benefit from skilled outpatient physical therapy to address the deficits listed in the problem list box on initial evaluation, provide pt/family education and to maximize pt's level of independence in the home and community environment.     Pt's spiritual, cultural and educational needs considered and pt agreeable to plan of care and goals.       Anticipated barriers to physical therapy:  co morbidities, age, overweight       Goals:     STG  Weeks/Visits Date Established  Goal Status    1.Pt will report at worst pain of  </= 6/10 to improve quality of life 4 weeks  6/10/2020    In progress 7/21/2020    2. Pt will report >/= 65/104 on ALAYNA to indicate improved functional mobility  4 weeks  6/10/2020    In progress 7/21/2020    3.Pt will report she is able to stand for >/= 45 min to improve standing tolerance in the kitchen  4 weeks  6/10/2020    In progress 7/21/2020    4.Pt will perform SLS on LLE for >/= 15 sec to decrease risk for falls  4 weeks  6/10/2020    In progress 7/21/2020    5.Pt will demonstrate compliance and independence with HEP to improve therapy outcomes  4 weeks  6/10/2020    Met 7/14/2020      LTG Weeks/Visits Date Established  Goal Status    1.Pt will report at worst pain of </= 6/10 to improve quality of life 8 weeks 6/10/2020        2. Pt will report >/= 80/104 on ALAYNA to indicate improved functional mobility 8 weeks 6/10/2020           3.Pt will report she is able to stand for >/= 1 hour to improve standing tolerance in the kitchen  8 weeks 6/10/2020        4.Pt will perform SLS on LLE for >/= 20 sec to decrease risk for falls  8 weeks 6/10/2020        5.Pt will increase LLE strength to 5/5 to improve functional mobility  8 weeks 6/10/2020        6. Pt will navigate stairs with reciprocal gait pattern with 1 HR to improve stair navigation at home  8 weeks  6/10/2020                   Plan       Cont with current POC and progress as haylee progress SLS activity as haylee.          Aquiles Pickens, PT

## 2020-07-21 ENCOUNTER — CLINICAL SUPPORT (OUTPATIENT)
Dept: REHABILITATION | Facility: HOSPITAL | Age: 60
End: 2020-07-21
Payer: COMMERCIAL

## 2020-07-21 DIAGNOSIS — R29.898 DECREASED STRENGTH OF LOWER EXTREMITY: Primary | ICD-10-CM

## 2020-07-21 DIAGNOSIS — R52 TENDERNESS: ICD-10-CM

## 2020-07-21 DIAGNOSIS — M72.2 PLANTAR FASCIITIS, LEFT: ICD-10-CM

## 2020-07-21 DIAGNOSIS — R29.898 IMPAIRED FLEXIBILITY OF LOWER EXTREMITY: ICD-10-CM

## 2020-07-21 PROCEDURE — 97140 MANUAL THERAPY 1/> REGIONS: CPT

## 2020-07-21 PROCEDURE — 97110 THERAPEUTIC EXERCISES: CPT

## 2020-07-22 DIAGNOSIS — M79.672 PAIN OF LEFT HEEL: ICD-10-CM

## 2020-07-22 DIAGNOSIS — M72.2 PLANTAR FASCIITIS: ICD-10-CM

## 2020-07-22 DIAGNOSIS — M79.672 LEFT FOOT PAIN: ICD-10-CM

## 2020-07-22 DIAGNOSIS — M76.72 PERONEAL TENDINITIS OF LEFT LOWER EXTREMITY: Primary | ICD-10-CM

## 2020-07-22 RX ORDER — MELOXICAM 15 MG/1
15 TABLET ORAL DAILY
Qty: 30 TABLET | Refills: 0 | Status: SHIPPED | OUTPATIENT
Start: 2020-07-22 | End: 2020-09-10 | Stop reason: SDUPTHER

## 2020-07-23 ENCOUNTER — CLINICAL SUPPORT (OUTPATIENT)
Dept: REHABILITATION | Facility: HOSPITAL | Age: 60
End: 2020-07-23
Payer: COMMERCIAL

## 2020-07-23 DIAGNOSIS — R29.898 IMPAIRED FLEXIBILITY OF LOWER EXTREMITY: ICD-10-CM

## 2020-07-23 DIAGNOSIS — R29.898 DECREASED STRENGTH OF LOWER EXTREMITY: ICD-10-CM

## 2020-07-23 DIAGNOSIS — M72.2 PLANTAR FASCIITIS, LEFT: ICD-10-CM

## 2020-07-23 DIAGNOSIS — R52 TENDERNESS: ICD-10-CM

## 2020-07-23 PROCEDURE — 97112 NEUROMUSCULAR REEDUCATION: CPT | Mod: KX,CQ

## 2020-07-23 PROCEDURE — 97110 THERAPEUTIC EXERCISES: CPT | Mod: KX,CQ

## 2020-07-23 PROCEDURE — 97035 APP MDLTY 1+ULTRASOUND EA 15: CPT | Mod: KX,CQ

## 2020-07-23 NOTE — PROGRESS NOTES
[facility] OUTPATIENT  Physical Therapy Daily Treatment Note     Name: Paula ObregonNatanael  Clinic Number: [id number]    Therapy Diagnosis:   Encounter Diagnoses   Name Primary?    Decreased strength of lower extremity     Tenderness     Impaired flexibility of lower extremity     Plantar fasciitis, left      Physician: Jony Wang DPM    Visit Date: 7/23/2020    Physician Orders: PT Eval and Treat  Medical Diagnosis from Referral: M76.72 (ICD-10-CM) - Peroneal tendinitis of left lower extremity      Evaluation Date: 6/10/2020  Authorization Period Expiration: 12/31/2020  Current Certification Period: 6/10/2020 - 7/31/2020  Plan of Care Expiration: 7/31/2020    Visit # / Visits authorized: 10/ 40 (Freq Ev + 2W6,1W2)     Total Visit count: 10    PTA Visit: 1/5      Re-assessment due by: 8/14/2020      Time In: 0945  Time Out: 1045        Precautions: Standard      Subjective     Pt reports:  Today she reports no pain only some discomfort.       She was compliant with home exercise program.    Response to previous treatment: tape did not stay in place  Functional change: n/a        Pain: 4/10  Location: left feet        Objective       Paula received therapeutic exercises to develop strength, endurance, ROM and flexibility for 30 minutes including:       · Nu Step L1  x 5 min held  · +recumbent bike x 7 min L3  · Ankle four way with GTB 15 x 2  · Standing slant board gastroc stretch 60 sec x 2  · Seated hamstring stretch 60 sec x 2  · + heel raises on half foam 10 x 2  · Heel raise with inversion 10   · Arch doming x 1 min x 2   · Toe yoga 30 sec x 2   · Towel scrunches 60 sec x 3   · Marbles In/Ev 20 x 2    Paula participated in neuromuscular re-education activities to improve: Balance, Coordination and Proprioception for 8 minutes. The following activities were included: KT taping     SLS for balance with cues to maintain arch for duration. 20 sec x 5      Kinesiotaping ( for edema control)  Patient was screened for use of kinesiotape and was cleared for use. Pt. received kinesiotaping on L foot/ankle edema control.  1. Has the patient ever had a reaction to the adhesive in bandaids? No:   2. Has the patient ever uses athletic/kinesiotape in the past? No  3. Is the patient hemodynamically impaired (PE, DVT, CHF, Kidney failure)? Yes/No: No  4. Can the PT/PTA apply the tape to your skin? Yes/No: Yes     Patient was instructed on duration to wear the tape, proper drying techniques for the tape, and to remove the tape if he/she has any skin irritation: including, but not exclusive to, redness/itchiness/discomfort. Patient verbalized understanding of these instructions.    Paula received the following manual therapy techniques: Joint mobs were applied to the: L ankle/foot for ankle DF/PF, IN/EV as well as to the A/P mobs to the distal tib fib joint for improved accessory motion and to facilitate DF/PF x 13 min.  NP      Paula participated in dynamic functional therapeutic activities/ Physical Performance Testing to improve functional performance for 0 minutes, including:      Paula participated in gait training to improve functional mobility and safety for 0  minutes, including:      Paula received the following direct contact modalities after being cleared for contraindications:Ultrasound:  Paula received ultrasound with 3MHz to manage pain and inflammation at 100 % duty cycle applied to the 1.5W/cm2  for a duration of 0 minutes to her L foot at her meta tarsal heads. Patient tolerated treatment well without adverse effects. Therapist was in attendance throughout intervention.     Paula received the following supervised modalities after being cleared for contradictions N/A    Paula received hot pack for 0 minutes to N/A.    Paula received cold pack for 10 minutes to L ankle foot for edema control      Home Exercises Provided and Patient Education Provided      Education provided:   - Reviewed HEP and educated on mode frequency reps, and sets as well as when to stop TE.  Patient verbalzied understanding, performed return demonstration and with no adverse affects noted nor verbalized.        Written Home Exercises Provided: Patient instructed to cont prior HEP.  Exercises were reviewed and Paula was able to demonstrate them prior to the end of the session.  Paula demonstrated fair  understanding of the education provided.     See EMR under Media for exercises provided prior visit.    Assessment     Patient participated with PT to address her edema, ROM, strength activity haylee and functional mob.  She was able to perform her not Rx with minimal cueing for form correction.  She was able to tolerate addition of SLS however she required cues to encourage her to actively maintain her arch throughout during of each reps. Heel raises with inversion added to TE to strengthen musculature supporting arch. Patient is expected to improve and progress with cont PT Rx in order to obtain her established goals and to return to pain reduced/free mobility.        Paula is progressing well towards her goals.   Pt prognosis is Good.     Pt will continue to benefit from skilled outpatient physical therapy to address the deficits listed in the problem list box on initial evaluation, provide pt/family education and to maximize pt's level of independence in the home and community environment.     Pt's spiritual, cultural and educational needs considered and pt agreeable to plan of care and goals.       Anticipated barriers to physical therapy:  co morbidities, age, overweight       Goals:     STG  Weeks/Visits Date Established  Goal Status    1.Pt will report at worst pain of </= 6/10 to improve quality of life 4 weeks  6/10/2020    In progress 7/23/2020    2. Pt will report >/= 65/104 on ALAYNA to indicate improved functional mobility  4 weeks  6/10/2020    In progress 7/23/2020    3.Pt will  report she is able to stand for >/= 45 min to improve standing tolerance in the kitchen  4 weeks  6/10/2020    In progress 7/23/2020    4.Pt will perform SLS on LLE for >/= 15 sec to decrease risk for falls  4 weeks  6/10/2020    In progress 7/23/2020    5.Pt will demonstrate compliance and independence with HEP to improve therapy outcomes  4 weeks  6/10/2020    Met 7/14/2020      LTG Weeks/Visits Date Established  Goal Status    1.Pt will report at worst pain of </= 6/10 to improve quality of life 8 weeks 6/10/2020        2. Pt will report >/= 80/104 on ALAYNA to indicate improved functional mobility 8 weeks 6/10/2020           3.Pt will report she is able to stand for >/= 1 hour to improve standing tolerance in the kitchen  8 weeks 6/10/2020        4.Pt will perform SLS on LLE for >/= 20 sec to decrease risk for falls  8 weeks 6/10/2020        5.Pt will increase LLE strength to 5/5 to improve functional mobility  8 weeks 6/10/2020        6. Pt will navigate stairs with reciprocal gait pattern with 1 HR to improve stair navigation at home  8 weeks  6/10/2020                   Plan       Cont with current POC and progress as haylee progress SLS activity as haylee.          Charley Todd, PTA

## 2020-07-24 ENCOUNTER — TELEPHONE (OUTPATIENT)
Dept: PODIATRY | Facility: CLINIC | Age: 60
End: 2020-07-24

## 2020-07-24 NOTE — TELEPHONE ENCOUNTER
Patient called regarding prescription for Mobic, rx was set to print and was not sent in electronically.      Prescription was called in and patient was notified.

## 2020-08-04 ENCOUNTER — LAB VISIT (OUTPATIENT)
Dept: PRIMARY CARE CLINIC | Facility: OTHER | Age: 60
End: 2020-08-04
Attending: INTERNAL MEDICINE
Payer: COMMERCIAL

## 2020-08-04 DIAGNOSIS — Z11.59 SPECIAL SCREENING EXAMINATION FOR UNSPECIFIED VIRAL DISEASE: Primary | ICD-10-CM

## 2020-08-04 PROCEDURE — U0003 INFECTIOUS AGENT DETECTION BY NUCLEIC ACID (DNA OR RNA); SEVERE ACUTE RESPIRATORY SYNDROME CORONAVIRUS 2 (SARS-COV-2) (CORONAVIRUS DISEASE [COVID-19]), AMPLIFIED PROBE TECHNIQUE, MAKING USE OF HIGH THROUGHPUT TECHNOLOGIES AS DESCRIBED BY CMS-2020-01-R: HCPCS

## 2020-08-06 ENCOUNTER — CLINICAL SUPPORT (OUTPATIENT)
Dept: REHABILITATION | Facility: HOSPITAL | Age: 60
End: 2020-08-06
Payer: COMMERCIAL

## 2020-08-06 DIAGNOSIS — R29.898 IMPAIRED FLEXIBILITY OF LOWER EXTREMITY: ICD-10-CM

## 2020-08-06 DIAGNOSIS — R29.898 DECREASED STRENGTH OF LOWER EXTREMITY: ICD-10-CM

## 2020-08-06 DIAGNOSIS — M72.2 PLANTAR FASCIITIS, LEFT: Primary | ICD-10-CM

## 2020-08-06 DIAGNOSIS — R52 TENDERNESS: ICD-10-CM

## 2020-08-06 LAB — SARS-COV-2 RNA RESP QL NAA+PROBE: NOT DETECTED

## 2020-08-06 PROCEDURE — 97112 NEUROMUSCULAR REEDUCATION: CPT

## 2020-08-06 PROCEDURE — 97110 THERAPEUTIC EXERCISES: CPT

## 2020-08-06 NOTE — PROGRESS NOTES
[facility] OUTPATIENT  Physical Therapy Daily Treatment Note/ updated POC     Name: Paula Zamora  Clinic Number: [id number]    Therapy Diagnosis:   Encounter Diagnoses   Name Primary?    Decreased strength of lower extremity     Tenderness     Impaired flexibility of lower extremity     Plantar fasciitis, left Yes     Physician: Jony Wang DPM    Visit Date: 8/6/2020    Physician Orders: PT Eval and Treat  Medical Diagnosis from Referral: M76.72 (ICD-10-CM) - Peroneal tendinitis of left lower extremity      Evaluation Date: 6/10/2020  Authorization Period Expiration: 12/31/2020  Current Certification Period: 6/10/2020 - 7/31/2020  Plan of Care Expiration: 7/31/2020    Visit # / Visits authorized: 11/ 40 (Freq Ev + 2W6,1W2)   (updated POC 8/1/2020-9/25/2020 for 12 visits or 23 total)    Total Visit count: 11    PTA Visit: 0/5      Re-assessment due by: 8/14/2020      Time In: 0915  Time Out: 1000        Precautions: Standard      Subjective     Pt reports:  Today she reports no pain only some discomfort.       She was compliant with home exercise program.    Response to previous treatment: tape did not stay in place  Functional change: n/a        Pain: 4/10  Location: left feet        Objective       Paula received therapeutic exercises to develop strength, endurance, ROM and flexibility for 26 minutes including:       · Nu Step L1  x 5 min held  · recumbent bike x 6 min L3  · Ankle four way with GTB 15 x 2  · Standing slant board gastroc stretch 60 sec x 2  · Seated hamstring stretch 60 sec x 2  · heel raises on half foam 15 x 2  · Heel raise with inversion 10 x held  · Arch doming x 1 min x 2 held  · Toe yoga 30 sec x 2 held  · Towel scrunches 60 sec x 3   · Marbles In/Ev 20 x 2 held    Paula participated in neuromuscular re-education activities to improve: Balance, Coordination and Proprioception for 9 minutes. The following activities were included:  KT taping     · SLS for balance with cues to maintain arch for duration.   · BAPs board DF/PF, IN/Ev, CW/CCW 30 sec x 2 each  · Wobble board A/P Bialterally 1 min x 2 each    Paula received the following manual therapy techniques: Joint mobs were applied to the: L ankle/foot for ankle DF/PF, IN/EV as well as to the A/P mobs to the distal tib fib joint for improved accessory motion and to facilitate DF/PF x  min.  NP    5 minutes  Kinesiotaping ( for edema control)  Patient was screened for use of kinesiotape and was cleared for use. Pt. received kinesiotaping on L foot/ankle edema control.  1. Has the patient ever had a reaction to the adhesive in bandaids? No:   2. Has the patient ever uses athletic/kinesiotape in the past? No  3. Is the patient hemodynamically impaired (PE, DVT, CHF, Kidney failure)? Yes/No: No  4. Can the PT/PTA apply the tape to your skin? Yes/No: Yes     Patient was instructed on duration to wear the tape, proper drying techniques for the tape, and to remove the tape if he/she has any skin irritation: including, but not exclusive to, redness/itchiness/discomfort. Patient verbalized understanding of these instructions.        Paula participated in dynamic functional therapeutic activities/ Physical Performance Testing to improve functional performance for 0 minutes, including:      Paula participated in gait training to improve functional mobility and safety for 0  minutes, including:      Paula received the following direct contact modalities after being cleared for contraindications:Ultrasound:  Paula received ultrasound with 3MHz to manage pain and inflammation at 100 % duty cycle applied to the 1.5W/cm2  for a duration of 0 minutes to her L foot at her meta tarsal heads. Patient tolerated treatment well without adverse effects. Therapist was in attendance throughout intervention.     Paula received the following supervised modalities after being cleared for contradictions N/A    Paula  received hot pack for 0 minutes to N/A.    Paula received cold pack for 10 minutes to L ankle foot for edema control      Home Exercises Provided and Patient Education Provided     Education provided:   - Reviewed HEP and educated on mode frequency reps, and sets as well as when to stop TE.  Patient verbalzied understanding, performed return demonstration and with no adverse affects noted nor verbalized.        Written Home Exercises Provided: Patient instructed to cont prior HEP.  Exercises were reviewed and Paula was able to demonstrate them prior to the end of the session.  Paula demonstrated fair  understanding of the education provided.     See EMR under Media for exercises provided prior visit.    Assessment     Patient participated with Rx this day in order to address her L foot and ankle edema, strength activity haylee proprioception and function.  She was able to be challenged with her noted Exs as well as progressions with wobble board and no exacerbations in pain.  She cont to require cueing for ankle four way exs due to compensatory motions but with support and cueing was able to effectively perform.  Patient is expected to cont to improve with PT RX in order to optimize her functional mob. As she progresses to her established goals.     Paula is progressing well towards her goals.   Pt prognosis is Good.     Pt will continue to benefit from skilled outpatient physical therapy to address the deficits listed in the problem list box on initial evaluation, provide pt/family education and to maximize pt's level of independence in the home and community environment.     Pt's spiritual, cultural and educational needs considered and pt agreeable to plan of care and goals.       Anticipated barriers to physical therapy:  co morbidities, age, overweight       Goals:     STG  Weeks/Visits Date Established  Goal Status    1.Pt will report at worst pain of </= 6/10 to improve quality of life 4 weeks  6/10/2020    In  progress 8/6/2020    2. Pt will report >/= 65/104 on ALAYNA to indicate improved functional mobility  4 weeks  6/10/2020    In progress 8/6/2020    3.Pt will report she is able to stand for >/= 45 min to improve standing tolerance in the kitchen  4 weeks  6/10/2020    In progress 8/6/2020    4.Pt will perform SLS on LLE for >/= 15 sec to decrease risk for falls  4 weeks  6/10/2020    In progress 8/6/2020    5.Pt will demonstrate compliance and independence with HEP to improve therapy outcomes  4 weeks  6/10/2020    Met 7/14/2020      LTG Weeks/Visits Date Established  Goal Status    1.Pt will report at worst pain of </= 6/10 to improve quality of life 8 weeks 6/10/2020        2. Pt will report >/= 80/104 on ALAYNA to indicate improved functional mobility 8 weeks 6/10/2020           3.Pt will report she is able to stand for >/= 1 hour to improve standing tolerance in the kitchen  8 weeks 6/10/2020        4.Pt will perform SLS on LLE for >/= 20 sec to decrease risk for falls  8 weeks 6/10/2020        5.Pt will increase LLE strength to 5/5 to improve functional mobility  8 weeks 6/10/2020        6. Pt will navigate stairs with reciprocal gait pattern with 1 HR to improve stair navigation at home  8 weeks  6/10/2020                   Plan       Cont with current POC and progress as haylee progress SLS activity as haylee.       (updated POC 8/1/2020-9/25/2020 for 12 visits or 23 total)      POC valid from 8/1/2020 through 9/25/2020. 2w6 or 12 visits prn with treatments to consist of: Balance (88515): Improve overall coordination and balance, Cardiovascular, Closed Chain Strengthening, Core Stabilization, Flexibility (65388): improve muscle ROM, flexibility and  function, Home Exercise and Stretching, Patient Education, Plyometrics, Postural Awareness and  Training, Postural Stabilization, ROM Exercises (17101) : Passive or active activities to increase joint ROM, Strengthening  (46703): improve muscle strength and  function., Therapeutic Exercise (43173): improve muscle strength, ROM, flexibility and muscle function., Gait Training (89727) : Improve overall gait function including stair climbing, Cross Friction Massage, Manual Stretching (89358): passive or active stretching to improve muscle length and function., Strain/Counter-Strain, Manual Traction, Myofascial Release , Peripheral Joint Mobilization, Soft Tissue Mobs (57169): increase ROM, tissue length, joint mechanics and modulate pain., Spine Mobilization  (48169): increase ROM, tissue length, joint mechanics and modulate pain., Massage (48913):, Combo E-Stim/Ultrasound, Cryotherapy (60594: Application of cold to decrease local swelling and decrease pain., Heat - 15516:  Application of heat to increase local circulation and decrease pain., Hi-Volt E-Stim  (): Application of electrical stimulation to modulate pain., IFC E-Stim (): Application of electrical stimulation to modulate pain., Mechanical Traction (33687), Micro-Current, Premodulated E-Stim  (): Application of electrical stimulation to modulate pain., TENs E-Stim  (): Application of electrical stimulation to modulate pain., Ultrasound (47765): increase local circulation, improve tissue healing time and modulate pain., Whirlpool (38953): increase local tissue circulation, improve elasticity of tissues, increased blood flow for improved muscle strength, ROM, flexiblity, and function., NMES E-stim ():  Application of electrical stimulation for motor learning and control., Iontophoresis (96085), NMR (98680): re-education of movement, balance, coordination, kinesthetic sense, posture and proprioception, Self Care & Home Management (16693) - Self-care/home management training (e.g., activities of daily living [ADL] and compensatory training, meal preparation, safety procedures, and instructions in use of assistive technology devices/adaptive equipment), direct one-on-one contact (15 minutes),  Therapeutic Activity (55334):  Use of dynamic activities to improve functional performance..                Aquiles Pickens, PT

## 2020-08-11 ENCOUNTER — CLINICAL SUPPORT (OUTPATIENT)
Dept: REHABILITATION | Facility: HOSPITAL | Age: 60
End: 2020-08-11
Payer: COMMERCIAL

## 2020-08-11 DIAGNOSIS — R52 TENDERNESS: ICD-10-CM

## 2020-08-11 DIAGNOSIS — R29.898 DECREASED STRENGTH OF LOWER EXTREMITY: Primary | ICD-10-CM

## 2020-08-11 DIAGNOSIS — R29.898 IMPAIRED FLEXIBILITY OF LOWER EXTREMITY: ICD-10-CM

## 2020-08-11 DIAGNOSIS — M72.2 PLANTAR FASCIITIS, LEFT: ICD-10-CM

## 2020-08-11 PROCEDURE — 97750 PHYSICAL PERFORMANCE TEST: CPT

## 2020-08-11 PROCEDURE — 97112 NEUROMUSCULAR REEDUCATION: CPT

## 2020-08-11 PROCEDURE — 97110 THERAPEUTIC EXERCISES: CPT

## 2020-08-11 NOTE — PLAN OF CARE
[facility] OUTPATIENT  Physical Therapy Daily Treatment Note/ updated POC     Name: Paula Zamora  Clinic Number: [id number]    Therapy Diagnosis:   Encounter Diagnoses   Name Primary?    Decreased strength of lower extremity     Tenderness     Impaired flexibility of lower extremity     Plantar fasciitis, left Yes     Physician: Jony Wang DPM    Visit Date: 8/6/2020    Physician Orders: PT Eval and Treat  Medical Diagnosis from Referral: M76.72 (ICD-10-CM) - Peroneal tendinitis of left lower extremity      Evaluation Date: 6/10/2020  Authorization Period Expiration: 12/31/2020  Current Certification Period: 6/10/2020 - 7/31/2020  Plan of Care Expiration: 7/31/2020    Visit # / Visits authorized: 11/ 40 (Freq Ev + 2W6,1W2)   (updated POC 8/1/2020-9/25/2020 for 12 visits or 23 total)    Total Visit count: 11    PTA Visit: 0/5      Re-assessment due by: 8/14/2020      Time In: 0915  Time Out: 1000        Precautions: Standard      Subjective     Pt reports:  Today she reports no pain only some discomfort.       She was compliant with home exercise program.    Response to previous treatment: tape did not stay in place  Functional change: n/a        Pain: 4/10  Location: left feet        Objective       Paula received therapeutic exercises to develop strength, endurance, ROM and flexibility for 26 minutes including:       · Nu Step L1  x 5 min held  · recumbent bike x 6 min L3  · Ankle four way with GTB 15 x 2  · Standing slant board gastroc stretch 60 sec x 2  · Seated hamstring stretch 60 sec x 2  · heel raises on half foam 15 x 2  · Heel raise with inversion 10 x held  · Arch doming x 1 min x 2 held  · Toe yoga 30 sec x 2 held  · Towel scrunches 60 sec x 3   · Marbles In/Ev 20 x 2 held    Paula participated in neuromuscular re-education activities to improve: Balance, Coordination and Proprioception for 9 minutes. The following activities were included: KT taping     · SLS for  balance with cues to maintain arch for duration.   · BAPs board DF/PF, IN/Ev, CW/CCW 30 sec x 2 each  · Wobble board A/P Bialterally 1 min x 2 each    Paula received the following manual therapy techniques: Joint mobs were applied to the: L ankle/foot for ankle DF/PF, IN/EV as well as to the A/P mobs to the distal tib fib joint for improved accessory motion and to facilitate DF/PF x  min.  NP    5 minutes  Kinesiotaping ( for edema control)  Patient was screened for use of kinesiotape and was cleared for use. Pt. received kinesiotaping on L foot/ankle edema control.  1. Has the patient ever had a reaction to the adhesive in bandaids? No:   2. Has the patient ever uses athletic/kinesiotape in the past? No  3. Is the patient hemodynamically impaired (PE, DVT, CHF, Kidney failure)? Yes/No: No  4. Can the PT/PTA apply the tape to your skin? Yes/No: Yes     Patient was instructed on duration to wear the tape, proper drying techniques for the tape, and to remove the tape if he/she has any skin irritation: including, but not exclusive to, redness/itchiness/discomfort. Patient verbalized understanding of these instructions.        Paula participated in dynamic functional therapeutic activities/ Physical Performance Testing to improve functional performance for 0 minutes, including:      Paula participated in gait training to improve functional mobility and safety for 0  minutes, including:      Paula received the following direct contact modalities after being cleared for contraindications:Ultrasound:  Paula received ultrasound with 3MHz to manage pain and inflammation at 100 % duty cycle applied to the 1.5W/cm2  for a duration of 0 minutes to her L foot at her meta tarsal heads. Patient tolerated treatment well without adverse effects. Therapist was in attendance throughout intervention.     Paula received the following supervised modalities after being cleared for contradictions N/A    Paula received hot pack for 0  minutes to N/A.    Paula received cold pack for 10 minutes to L ankle foot for edema control      Home Exercises Provided and Patient Education Provided     Education provided:   - Reviewed HEP and educated on mode frequency reps, and sets as well as when to stop TE.  Patient verbalzied understanding, performed return demonstration and with no adverse affects noted nor verbalized.        Written Home Exercises Provided: Patient instructed to cont prior HEP.  Exercises were reviewed and Paula was able to demonstrate them prior to the end of the session.  Paula demonstrated fair  understanding of the education provided.     See EMR under Media for exercises provided prior visit.    Assessment     Patient participated with Rx this day in order to address her L foot and ankle edema, strength activity haylee proprioception and function.  She was able to be challenged with her noted Exs as well as progressions with wobble board and no exacerbations in pain.  She cont to require cueing for ankle four way exs due to compensatory motions but with support and cueing was able to effectively perform.  Patient is expected to cont to improve with PT RX in order to optimize her functional mob. As she progresses to her established goals.     Paula is progressing well towards her goals.   Pt prognosis is Good.     Pt will continue to benefit from skilled outpatient physical therapy to address the deficits listed in the problem list box on initial evaluation, provide pt/family education and to maximize pt's level of independence in the home and community environment.     Pt's spiritual, cultural and educational needs considered and pt agreeable to plan of care and goals.       Anticipated barriers to physical therapy:  co morbidities, age, overweight       Goals:     STG  Weeks/Visits Date Established  Goal Status    1.Pt will report at worst pain of </= 6/10 to improve quality of life 4 weeks  6/10/2020    In progress 8/6/2020    2. Pt  will report >/= 65/104 on ALAYNA to indicate improved functional mobility  4 weeks  6/10/2020    In progress 8/6/2020    3.Pt will report she is able to stand for >/= 45 min to improve standing tolerance in the kitchen  4 weeks  6/10/2020    In progress 8/6/2020    4.Pt will perform SLS on LLE for >/= 15 sec to decrease risk for falls  4 weeks  6/10/2020    In progress 8/6/2020    5.Pt will demonstrate compliance and independence with HEP to improve therapy outcomes  4 weeks  6/10/2020    Met 7/14/2020      LTG Weeks/Visits Date Established  Goal Status    1.Pt will report at worst pain of </= 6/10 to improve quality of life 8 weeks 6/10/2020        2. Pt will report >/= 80/104 on ALAYNA to indicate improved functional mobility 8 weeks 6/10/2020           3.Pt will report she is able to stand for >/= 1 hour to improve standing tolerance in the kitchen  8 weeks 6/10/2020        4.Pt will perform SLS on LLE for >/= 20 sec to decrease risk for falls  8 weeks 6/10/2020        5.Pt will increase LLE strength to 5/5 to improve functional mobility  8 weeks 6/10/2020        6. Pt will navigate stairs with reciprocal gait pattern with 1 HR to improve stair navigation at home  8 weeks  6/10/2020                   Plan       Cont with current POC and progress as haylee progress SLS activity as haylee.       (updated POC 8/1/2020-9/25/2020 for 12 visits or 23 total)      POC valid from 8/1/2020 through 9/25/2020. 2w6 or 12 visits prn with treatments to consist of: Balance (49194): Improve overall coordination and balance, Cardiovascular, Closed Chain Strengthening, Core Stabilization, Flexibility (15969): improve muscle ROM, flexibility and  function, Home Exercise and Stretching, Patient Education, Plyometrics, Postural Awareness and  Training, Postural Stabilization, ROM Exercises (74791) : Passive or active activities to increase joint ROM, Strengthening  (36682): improve muscle strength and function., Therapeutic  Exercise (18482): improve muscle strength, ROM, flexibility and muscle function., Gait Training (79469) : Improve overall gait function including stair climbing, Cross Friction Massage, Manual Stretching (21752): passive or active stretching to improve muscle length and function., Strain/Counter-Strain, Manual Traction, Myofascial Release , Peripheral Joint Mobilization, Soft Tissue Mobs (03217): increase ROM, tissue length, joint mechanics and modulate pain., Spine Mobilization  (19692): increase ROM, tissue length, joint mechanics and modulate pain., Massage (80874):, Combo E-Stim/Ultrasound, Cryotherapy (59655: Application of cold to decrease local swelling and decrease pain., Heat - 93906:  Application of heat to increase local circulation and decrease pain., Hi-Volt E-Stim  (): Application of electrical stimulation to modulate pain., IFC E-Stim (): Application of electrical stimulation to modulate pain., Mechanical Traction (71585), Micro-Current, Premodulated E-Stim  (): Application of electrical stimulation to modulate pain., TENs E-Stim  (): Application of electrical stimulation to modulate pain., Ultrasound (32987): increase local circulation, improve tissue healing time and modulate pain., Whirlpool (75522): increase local tissue circulation, improve elasticity of tissues, increased blood flow for improved muscle strength, ROM, flexiblity, and function., NMES E-stim ():  Application of electrical stimulation for motor learning and control., Iontophoresis (66326), NMR (32395): re-education of movement, balance, coordination, kinesthetic sense, posture and proprioception, Self Care & Home Management (44602) - Self-care/home management training (e.g., activities of daily living [ADL] and compensatory training, meal preparation, safety procedures, and instructions in use of assistive technology devices/adaptive equipment), direct one-on-one contact (15 minutes), Therapeutic Activity  (37930):  Use of dynamic activities to improve functional performance..                Aquiles Pickens, PT

## 2020-08-11 NOTE — PROGRESS NOTES
[facility] OUTPATIENT  Physical Therapy Daily Treatment Note/ POC Review     Name: Paula Zamora  Clinic Number: [id number]    Therapy Diagnosis:   No diagnosis found.  Physician: Jony Wang DPM    Visit Date: 8/11/2020    Physician Orders: PT Eval and Treat  Medical Diagnosis from Referral: M76.72 (ICD-10-CM) - Peroneal tendinitis of left lower extremity      Evaluation Date: 6/10/2020  Authorization Period Expiration: 12/31/2020  Current Certification Period: 6/10/2020 - 7/31/2020  Plan of Care Expiration: 7/31/2020 (updated POC 8/1/2020-9/25/2020 for 12 visits or 23 total)    Visit # / Visits authorized: 12/ 40 (Freq Ev + 2W6,1W2)     Total Visit count: 12    PTA Visit: 0/5      POC Review due by: 9/11/2020      Time In: 1045  Time Out: 1135        Precautions: Standard      Subjective     Pt reports:  Today she reports no pain only some discomfort, also relates the KT did well but came off after about two days.  She adds that her worst dexter was na 8/10 when she was performing prolonged standing.      She was compliant with home exercise program.    Response to previous treatment: tape did not stay in place  Functional change: ability to stand for a longer period of time (30-45 minutes)        Pain: 0/10  Location: left feet        Objective       Paula received therapeutic exercises to develop strength, endurance, ROM and flexibility for 15 minutes including:     · Nu Step L1  x 5 min held  · recumbent bike x 6 min L3  · Ankle four way with GTB 15 x 2- held  · Standing slant board gastroc stretch 60 sec x 2  · Seated hamstring stretch 60 sec x 2-held  · heel raises on half foam 15 x 2  · Heel raise with inversion 10 x held  · Arch doming x 1 min x 2 held  · Toe yoga 30 sec x 2 held  · Towel scrunches 60 sec x 3   · Marbles In/Ev 20 x 2 held    Paula participated in neuromuscular re-education activities to improve: Balance, Coordination and Proprioception  for 10 minutes. The following activities were included: KT taping     · SLS for balance with cues to maintain arch for duration.   · BAPs board DF/PF, IN/Ev, CW/CCW 30 sec x 2 each L3  · Wobble board A/P Bialterally 1 min x 2 each    Paula received the following manual therapy techniques: Joint mobs were applied to the: L ankle/foot for ankle DF/PF, IN/EV as well as to the A/P mobs to the distal tib fib joint for improved accessory motion and to facilitate DF/PF x  min.  NP    5 minutes  Kinesiotaping ( for edema control)  Patient was screened for use of kinesiotape and was cleared for use. Pt. received kinesiotaping on L foot/ankle edema control.  1. Has the patient ever had a reaction to the adhesive in bandaids? No:                                    2. Has the patient ever uses athletic/kinesiotape in the past? No  3. Is the patient hemodynamically impaired (PE, DVT, CHF, Kidney failure)? Yes/No: No  4. Can the PT/PTA apply the tape to your skin? Yes/No: Yes     Patient was instructed on duration to wear the tape, proper drying techniques for the tape, and to remove the tape if he/she has any skin irritation: including, but not exclusive to, redness/itchiness/discomfort. Patient verbalized understanding of these instructions.        Puala participated in dynamic functional therapeutic activities/ Physical Performance Testing to improve functional performance for 13 minutes, including:    SLS L T1  13.2 T2 22.6  T3  31.1      Balance:                 SLS:                L foot   T1:  13.2 sec                                                  T2:  22.6 sec                                                  T3:  31.1 sec        Functional score:                                          ALAYNA: 64/104         Paula participated in gait training to improve functional mobility and safety for 0  minutes, including:      Paula received the following direct contact modalities after being cleared for  contraindications:Ultrasound:  Paula received ultrasound with 3MHz to manage pain and inflammation at 100 % duty cycle applied to the 1.5W/cm2  for a duration of 0 minutes to her L foot at her meta tarsal heads. Patient tolerated treatment well without adverse effects. Therapist was in attendance throughout intervention.     Paula received the following supervised modalities after being cleared for contradictions N/A    Paula received hot pack for 0 minutes to N/A.    Paula received cold pack for 0 minutes to L ankle foot for edema control      Home Exercises Provided and Patient Education Provided     Education provided:   - Reviewed HEP and educated on mode frequency reps, and sets as well as when to stop TE.  Patient verbalzied understanding, performed return demonstration and with no adverse affects noted nor verbalized.        Written Home Exercises Provided: Patient instructed to cont prior HEP.  Exercises were reviewed and Paula was able to demonstrate them prior to the end of the session.  Paula demonstrated fair  understanding of the education provided.     See EMR under Media for exercises provided prior visit.    Assessment/POC Review     Patient has participated with PT to address her pain/edema, prolonged standing, ROM, SLS/balalnce,  And activity haylee in order to improve her functional mob.  She is continuing to progress steadily with her functional mob but does continue with L ankle edema that produces pain and limts her ability to perform prolonged activities.  Patient has responded well to KT that has provided relief from edema and pain but most recently she has had difficulty with keeping KT donned.  Her current ALAYNA score is a 64/104 disability, and t date she has met STG 4 and 5 while STG 1, 2, and 3 are in progress.  Patient is expected to steadily progress to her goals along with improvements in her edema to her L ankle/foot in order to optimize her pain free/reduced function.          Paula is  progressing well towards her goals.   Pt prognosis is Good.     Pt will continue to benefit from skilled outpatient physical therapy to address the deficits listed in the problem list box on initial evaluation, provide pt/family education and to maximize pt's level of independence in the home and community environment.     Pt's spiritual, cultural and educational needs considered and pt agreeable to plan of care and goals.       Anticipated barriers to physical therapy:  co morbidities, age, overweight       Goals:     STG  Weeks/Visits Date Established  Goal Status    1.Pt will report at worst pain of </= 6/10 to improve quality of life 4 weeks  6/10/2020    In progress 8/11/2020    2. Pt will report >/= 65/104 on ALAYNA to indicate improved functional mobility  4 weeks  6/10/2020    In progress 8/11/2020    3.Pt will report she is able to stand for >/= 45 min to improve standing tolerance in the kitchen  4 weeks  6/10/2020    In progress 8/11/2020    4.Pt will perform SLS on LLE for >/= 15 sec to decrease risk for falls  4 weeks  6/10/2020    Met 8/11/2020   5.Pt will demonstrate compliance and independence with HEP to improve therapy outcomes  4 weeks  6/10/2020    Met 7/14/2020      LTG Weeks/Visits Date Established  Goal Status    1.Pt will report at worst pain of </= 6/10 to improve quality of life 8 weeks 6/10/2020        2. Pt will report >/= 80/104 on ALAYNA to indicate improved functional mobility 8 weeks 6/10/2020           3.Pt will report she is able to stand for >/= 1 hour to improve standing tolerance in the kitchen  8 weeks 6/10/2020        4.Pt will perform SLS on LLE for >/= 20 sec to decrease risk for falls  8 weeks 6/10/2020        5.Pt will increase LLE strength to 5/5 to improve functional mobility  8 weeks 6/10/2020        6. Pt will navigate stairs with reciprocal gait pattern with 1 HR to improve stair navigation at home  8 weeks  6/10/2020                   Plan       Cont with current POC and  progress as haylee progress SLS activity as haylee.  Follow up with KT for edema.          Aquiles Pickens, PT

## 2020-08-13 ENCOUNTER — CLINICAL SUPPORT (OUTPATIENT)
Dept: REHABILITATION | Facility: HOSPITAL | Age: 60
End: 2020-08-13
Payer: COMMERCIAL

## 2020-08-13 DIAGNOSIS — R29.898 IMPAIRED FLEXIBILITY OF LOWER EXTREMITY: ICD-10-CM

## 2020-08-13 DIAGNOSIS — R52 TENDERNESS: ICD-10-CM

## 2020-08-13 DIAGNOSIS — M72.2 PLANTAR FASCIITIS, LEFT: ICD-10-CM

## 2020-08-13 DIAGNOSIS — R29.898 DECREASED STRENGTH OF LOWER EXTREMITY: Primary | ICD-10-CM

## 2020-08-13 PROCEDURE — 97110 THERAPEUTIC EXERCISES: CPT

## 2020-08-13 PROCEDURE — 97112 NEUROMUSCULAR REEDUCATION: CPT

## 2020-08-13 NOTE — PROGRESS NOTES
[facility] OUTPATIENT  Physical Therapy Daily Treatment Note     [facility]  Clinic Number: [id number]    Therapy Diagnosis:   No diagnosis found.  Physician: Jony Wang DPM    Visit Date: 8/13/2020    Physician Orders: PT Eval and Treat  Medical Diagnosis from Referral: M76.72 (ICD-10-CM) - Peroneal tendinitis of left lower extremity      Evaluation Date: 6/10/2020  Authorization Period Expiration: 12/31/2020  Current Certification Period: 6/10/2020 - 7/31/2020  Plan of Care Expiration: 7/31/2020 (updated POC 8/1/2020-9/25/2020 for 12 visits or 23 total)    Visit # / Visits authorized: 12/ 40 (Freq Ev + 2W6,1W2)     Total Visit count: 13    PTA Visit: 0/5      POC Review due by: 9/11/2020      Time In: 0915  Time Out: 1010        Precautions: Standard      Subjective     Pt reports:  Today she reports no pain only some soreness, also relates the KT still in place but wants to come off.  She adds that she was able to perform more activities being on her feet including cooking and out in the comunity more since last visit.         She was compliant with home exercise program.    Response to previous treatment: some soreness  Functional change: ability to stand for a longer period of time (30-45 minutes)        Pain: 0/10  Location: left feet        Objective       Paula received therapeutic exercises to develop strength, endurance, ROM and flexibility for 20 minutes including:     · Nu Step L1  x 5 min held  · recumbent bike x 6 min L3  · Ankle four way with GTB 15 x 2  · Standing slant board gastroc stretch 60 sec x 2  · Seated hamstring stretch 60 sec x 2-held  · heel raises on half foam 15 x 2  · Heel raise with inversion 10 x held  · Arch doming x 1 min x 2 held  · Toe yoga 30 sec x 2 held  · Towel scrunches 60 sec x 3   · Marbles In/Ev 20 x 2 held    Paula participated in neuromuscular re-education activities to improve: Balance, Coordination  and Proprioception for 23 minutes. The following activities were included: KT taping     · SLS for balance with cues to maintain arch for duration.   · BAPs board DF/PF, IN/Ev, CW/CCW 30 sec x 2 each L3  · Wobble board A/P Bialterally 1 min x 2 each  · +Romberg EO/EC on foam 30 sec x 3 (EO x 1 and EC x 3)  · +SLS double cone tapping 10 x 2 (L in stance position)    Paula received the following manual therapy techniques: Joint mobs were applied to the: L ankle/foot for ankle DF/PF, IN/EV as well as to the A/P mobs to the distal tib fib joint for improved accessory motion and to facilitate DF/PF x  min.  NP    5 minutes  Kinesiotaping ( for edema control)  Patient was screened for use of kinesiotape and was cleared for use. Pt. received kinesiotaping on L foot/ankle edema control.  1. Has the patient ever had a reaction to the adhesive in bandaids? No:                                    2. Has the patient ever uses athletic/kinesiotape in the past? No  3. Is the patient hemodynamically impaired (PE, DVT, CHF, Kidney failure)? Yes/No: No  4. Can the PT/PTA apply the tape to your skin? Yes/No: Yes     Patient was instructed on duration to wear the tape, proper drying techniques for the tape, and to remove the tape if he/she has any skin irritation: including, but not exclusive to, redness/itchiness/discomfort. Patient verbalized understanding of these instructions.        Paula participated in dynamic functional therapeutic activities/ Physical Performance Testing to improve functional performance for 0 minutes, including:        Paula participated in gait training to improve functional mobility and safety for 0  minutes, including:      Paula received the following direct contact modalities after being cleared for contraindications:Ultrasound:  Paula received ultrasound with 3MHz to manage pain and inflammation at 100 % duty cycle applied to the 1.5W/cm2  for a duration of 0 minutes to her L foot at her meta tarsal  heads. Patient tolerated treatment well without adverse effects. Therapist was in attendance throughout intervention.     Paula received the following supervised modalities after being cleared for contradictions N/A    Paula received hot pack for 0 minutes to N/A.    Paula received cold pack for 9 minutes to L ankle foot for edema control      Home Exercises Provided and Patient Education Provided     Education provided:   - Reviewed HEP and educated on mode frequency reps, and sets as well as when to stop TE.  Patient verbalzied understanding, performed return demonstration and with no adverse affects noted nor verbalized.        Written Home Exercises Provided: Patient instructed to cont prior HEP.  Exercises were reviewed and Paula was able to demonstrate them prior to the end of the session.  Paula demonstrated fair  understanding of the education provided.     See EMR under Media for exercises provided prior visit.    Assessment     Patient participated with PT to address her flexibility, balance,. proprioception, strength ROM, edema and functional mob.  She was able to perform her noted TE and progressed with SLS in order to improve her functional mob.  Patient required cueing for improved exs quality and performance.  She was able to haylee progressed SLS, balance and proprioceptive exs as noted and was challenged with Romberg EC on foam and cone tapping with L foot in stance position.  Patient received KT as noted with longer anchors applied.  She is expected to improve and progress to goals with cont PT Rx.        Paula is progressing well towards her goals.   Pt prognosis is Good.     Pt will continue to benefit from skilled outpatient physical therapy to address the deficits listed in the problem list box on initial evaluation, provide pt/family education and to maximize pt's level of independence in the home and community environment.     Pt's spiritual, cultural and educational needs considered and pt  agreeable to plan of care and goals.       Anticipated barriers to physical therapy:  co morbidities, age, overweight       Goals:     STG  Weeks/Visits Date Established  Goal Status    1.Pt will report at worst pain of </= 6/10 to improve quality of life 4 weeks  6/10/2020    In progress 8/13/2020    2. Pt will report >/= 65/104 on ALAYNA to indicate improved functional mobility  4 weeks  6/10/2020    In progress 8/13/2020    3.Pt will report she is able to stand for >/= 45 min to improve standing tolerance in the kitchen  4 weeks  6/10/2020    In progress 8/13/2020    4.Pt will perform SLS on LLE for >/= 15 sec to decrease risk for falls  4 weeks  6/10/2020    Met 8/13/2020   5.Pt will demonstrate compliance and independence with HEP to improve therapy outcomes  4 weeks  6/10/2020    Met 7/14/2020      LTG Weeks/Visits Date Established  Goal Status    1.Pt will report at worst pain of </= 6/10 to improve quality of life 8 weeks 6/10/2020        2. Pt will report >/= 80/104 on ALAYNA to indicate improved functional mobility 8 weeks 6/10/2020           3.Pt will report she is able to stand for >/= 1 hour to improve standing tolerance in the kitchen  8 weeks 6/10/2020        4.Pt will perform SLS on LLE for >/= 20 sec to decrease risk for falls  8 weeks 6/10/2020        5.Pt will increase LLE strength to 5/5 to improve functional mobility  8 weeks 6/10/2020        6. Pt will navigate stairs with reciprocal gait pattern with 1 HR to improve stair navigation at home  8 weeks  6/10/2020                   Plan     Cont with current POC and progress as haylee progress SLS activity as haylee.  Follow up with KT for edema.          Aquiles Pickens, PT

## 2020-08-19 NOTE — PROGRESS NOTES
[facility] OUTPATIENT  Physical Therapy Daily Treatment Note     Name: Paula ObregonNatanael  Clinic Number: [id number]    Therapy Diagnosis:   Encounter Diagnoses   Name Primary?    Decreased strength of lower extremity Yes    Tenderness     Impaired flexibility of lower extremity     Plantar fasciitis, left      Physician: Jony Wang DPM    Visit Date: 8/20/2020    Physician Orders: PT Eval and Treat  Medical Diagnosis from Referral: M76.72 (ICD-10-CM) - Peroneal tendinitis of left lower extremity      Evaluation Date: 6/10/2020  Authorization Period Expiration: 12/31/2020  Current Certification Period: 6/10/2020 - 7/31/2020  Plan of Care Expiration: 7/31/2020 (updated POC 8/1/2020-9/25/2020 for 12 visits or 23 total)    Visit # / Visits authorized: 13/ 40 (Freq Ev + 2W6,1W2)     Total Visit count: 14    PTA Visit: 0/5      POC Review due by: 9/11/2020      Time In: 0915  Time Out: 1005        Precautions: Standard      Subjective     Pt reports:  Patient relates no pain this day just some soreness to her plantar surface.      She was intermittently compliant with home exercise program.    Response to previous treatment: some soreness  Functional change: as noted      Pain: 0/10  Location: left feet        Objective       Paula received therapeutic exercises to develop strength, endurance, ROM and flexibility for 18 minutes including:     · Nu Step L1  x 5 min held  · recumbent bike x 6 min L3  · Ankle four way with GTB 15 x 2 held  · Standing slant board gastroc stretch 60 sec x 2  · Seated hamstring stretch 60 sec x 2-held  · heel raises on half foam 15 x 2  · Heel raise with inversion 10 x held  · Arch doming x 1 min x 2  · Toe yoga 30 sec x 2 held  · Towel scrunches 60 sec x 3   · Marbles In/Ev 20 x 2 held    Paula participated in neuromuscular re-education activities to improve: Balance, Coordination and Proprioception for 25 minutes. The following  activities were included: KT taping     · SLS for balance with cues to maintain arch for duration. held  · BAPs board DF/PF, IN/Ev, CW/CCW 30 sec x 2 each L3  · Wobble board A/P Bialterally 1 min x 2 each  · Romberg EC on foam 30 sec x 4 (with 2 incidents of LOB and a protective response)  · SLS double cone tapping 10 x 2 (L in stance position)    Paula received the following manual therapy techniques: Joint mobs were applied to the: L ankle/foot for ankle DF/PF, IN/EV as well as to the A/P mobs to the distal tib fib joint for improved accessory motion and to facilitate DF/PF x  min.  NP    5 minutes  Kinesiotaping ( for edema control)  Patient was screened for use of kinesiotape and was cleared for use. Pt. received kinesiotaping on L foot/ankle edema control with two I strips.  1. Has the patient ever had a reaction to the adhesive in bandaids? No:                                    2. Has the patient ever uses athletic/kinesiotape in the past? No  3. Is the patient hemodynamically impaired (PE, DVT, CHF, Kidney failure)? Yes/No: No  4. Can the PT/PTA apply the tape to your skin? Yes/No: Yes     Patient was instructed on duration to wear the tape, proper drying techniques for the tape, and to remove the tape if he/she has any skin irritation: including, but not exclusive to, redness/itchiness/discomfort. Patient verbalized understanding of these instructions.        Paula participated in dynamic functional therapeutic activities/ Physical Performance Testing to improve functional performance for 0 minutes, including:        Paula participated in gait training to improve functional mobility and safety for 0  minutes, including:      Paula received the following direct contact modalities after being cleared for contraindications:Ultrasound:  Paula received ultrasound with 3MHz to manage pain and inflammation at 100 % duty cycle applied to the 1.5W/cm2  for a duration of 0 minutes to her L foot at her meta tarsal  heads. Patient tolerated treatment well without adverse effects. Therapist was in attendance throughout intervention.     Paula received the following supervised modalities after being cleared for contradictions N/A    Paula received hot pack for 0 minutes to N/A.    Paula received cold pack for 0 minutes to L ankle foot for edema control- offered and declined.      Home Exercises Provided and Patient Education Provided     Education provided:   - Reviewed HEP and educated on mode frequency reps, and sets as well as when to stop TE.  Patient verbalzied understanding, performed return demonstration and with no adverse affects noted nor verbalized.        Written Home Exercises Provided: Patient instructed to cont prior HEP.  Exercises were reviewed and Paula was able to demonstrate them prior to the end of the session.  Paula demonstrated fair  understanding of the education provided.     See EMR under Media for exercises provided prior visit.    Assessment     Patient participated with PT to address her L ankle foot edema, pain mitigation, ROM, strength, balance proprioception, and functional mob.  She was able to haylee her Rx without c/o pain only some fatigue and was able to appropriately recover.  She did require cueing for exs quality and performance in order to improve targeted intrinsic and extrinsic musculature.  Patient received KT for edema control and is expected to cont to improve and progress with cont PT Rx I order to optimize her functional mob.        Paula is progressing well towards her goals.   Pt prognosis is Good.     Pt will continue to benefit from skilled outpatient physical therapy to address the deficits listed in the problem list box on initial evaluation, provide pt/family education and to maximize pt's level of independence in the home and community environment.     Pt's spiritual, cultural and educational needs considered and pt agreeable to plan of care and goals.       Anticipated  barriers to physical therapy:  co morbidities, age, overweight       Goals:     STG  Weeks/Visits Date Established  Goal Status    1.Pt will report at worst pain of </= 6/10 to improve quality of life 4 weeks  6/10/2020    In progress 8/20/2020    2. Pt will report >/= 65/104 on ALAYNA to indicate improved functional mobility  4 weeks  6/10/2020    In progress 8/20/2020    3.Pt will report she is able to stand for >/= 45 min to improve standing tolerance in the kitchen  4 weeks  6/10/2020    In progress 8/20/2020    4.Pt will perform SLS on LLE for >/= 15 sec to decrease risk for falls  4 weeks  6/10/2020    Met 8/20/2020   5.Pt will demonstrate compliance and independence with HEP to improve therapy outcomes  4 weeks  6/10/2020    Met 7/14/2020      LTG Weeks/Visits Date Established  Goal Status    1.Pt will report at worst pain of </= 6/10 to improve quality of life 8 weeks 6/10/2020        2. Pt will report >/= 80/104 on ALAYNA to indicate improved functional mobility 8 weeks 6/10/2020           3.Pt will report she is able to stand for >/= 1 hour to improve standing tolerance in the kitchen  8 weeks 6/10/2020        4.Pt will perform SLS on LLE for >/= 20 sec to decrease risk for falls  8 weeks 6/10/2020        5.Pt will increase LLE strength to 5/5 to improve functional mobility  8 weeks 6/10/2020        6. Pt will navigate stairs with reciprocal gait pattern with 1 HR to improve stair navigation at home  8 weeks  6/10/2020                   Plan       Cont with current POC and progress as haylee progress SLS activity as haylee.  Follow up with KT for edema.          Aquiles Pickens, PT

## 2020-08-20 ENCOUNTER — CLINICAL SUPPORT (OUTPATIENT)
Dept: REHABILITATION | Facility: HOSPITAL | Age: 60
End: 2020-08-20
Payer: COMMERCIAL

## 2020-08-20 DIAGNOSIS — M72.2 PLANTAR FASCIITIS, LEFT: ICD-10-CM

## 2020-08-20 DIAGNOSIS — R52 TENDERNESS: ICD-10-CM

## 2020-08-20 DIAGNOSIS — R29.898 DECREASED STRENGTH OF LOWER EXTREMITY: Primary | ICD-10-CM

## 2020-08-20 DIAGNOSIS — R29.898 IMPAIRED FLEXIBILITY OF LOWER EXTREMITY: ICD-10-CM

## 2020-08-20 PROCEDURE — 97112 NEUROMUSCULAR REEDUCATION: CPT | Mod: KX

## 2020-08-20 PROCEDURE — 97110 THERAPEUTIC EXERCISES: CPT | Mod: KX

## 2020-08-30 NOTE — PROGRESS NOTES
[facility] OUTPATIENT  Physical Therapy Daily Treatment Note     Name: Paula ObregonNatanael  Clinic Number: [id number]    Therapy Diagnosis:   Encounter Diagnoses   Name Primary?    Decreased strength of lower extremity Yes    Tenderness     Impaired flexibility of lower extremity     Plantar fasciitis, left      Physician: Tanvir Cisneros III, *    Visit Date: 9/1/2020    Physician Orders: PT Eval and Treat  Medical Diagnosis from Referral: M76.72 (ICD-10-CM) - Peroneal tendinitis of left lower extremity      Evaluation Date: 6/10/2020  Authorization Period Expiration: 12/31/2020  Current Certification Period: 6/10/2020 - 7/31/2020  Plan of Care Expiration: updated POC 8/1/2020-9/25/2020 for 12 visits or 23 total    Visit # / Visits authorized: 14/ 40      Total Visit count: 15    PTA Visit: 0/5      POC Review due by: 9/11/2020      Time In: 0830  Time Out: 0915        Precautions: Standard      Subjective     Pt reports:  Patient relates no pain this day and has een able to haylee standing for ~45 minutes as well as was able to haylee wearing her flats    She was intermittently compliant with home exercise program.    Response to previous treatment: some soreness  Functional change: as noted      Pain: 0/10  Location: left feet        Objective       Paula received therapeutic exercises to develop strength, endurance, ROM and flexibility for 14 minutes including:     · Nu Step L1  x 5 min held  · recumbent bike x 7 min L3  · Ankle four way with GTB 15 x 2 held  · Standing slant board gastroc stretch 60 sec x 2  · Seated hamstring stretch 60 sec  · heel raises on half foam 15 x 2  · Heel raise with inversion 10 x  · Arch doming x 1 min x 2 held  · Toe yoga 30 sec x 2 held  · Towel scrunches 60 sec x 3 held  · Marbles In/Ev 20 x 2 held    Paula participated in neuromuscular re-education activities to improve: Balance, Coordination and Proprioception for 25minutes. The  following activities were included: KT taping     · SLS for balance with cues to maintain arch for duration.  · BAPs board DF/PF, IN/Ev, CW/CCW 30 sec x 2 each L3 held  · Wobble board A/P Bialterally 1 min x 2 each  · Romberg EC on foam 30 sec x 4  · SLS double cone tapping 10 x 3 (L in stance position, last set with three cones)  · + BOSU 30 sec x 3  ·   Paula received the following manual therapy techniques: Joint mobs were applied to the: L ankle/foot for ankle DF/PF, IN/EV as well as to the A/P mobs to the distal tib fib joint for improved accessory motion and to facilitate DF/PF x  min.  NP    No KT today  Kinesiotaping ( for edema control)  Patient was screened for use of kinesiotape and was cleared for use. Pt. received kinesiotaping on L foot/ankle edema control with two I strips.  1. Has the patient ever had a reaction to the adhesive in bandaids? No:                                    2. Has the patient ever uses athletic/kinesiotape in the past? No  3. Is the patient hemodynamically impaired (PE, DVT, CHF, Kidney failure)? Yes/No: No  4. Can the PT/PTA apply the tape to your skin? Yes/No: Yes     Patient was instructed on duration to wear the tape, proper drying techniques for the tape, and to remove the tape if he/she has any skin irritation: including, but not exclusive to, redness/itchiness/discomfort. Patient verbalized understanding of these instructions.        Paula participated in dynamic functional therapeutic activities/ Physical Performance Testing to improve functional performance for 0 minutes, including:        Paula participated in gait training to improve functional mobility and safety for 0  minutes, including:      Paula received the following direct contact modalities after being cleared for contraindications:Ultrasound:  Paula received ultrasound with 3MHz to manage pain and inflammation at 100 % duty cycle applied to the 1.5W/cm2  for a duration of 0 minutes to her L foot at her meta  tarsal heads. Patient tolerated treatment well without adverse effects. Therapist was in attendance throughout intervention.     Paula received the following supervised modalities after being cleared for contradictions N/A    Paula received hot pack for 0 minutes to N/A.    Paula received cold pack for 0 minutes to L ankle foot for edema control- offered and declined.      Home Exercises Provided and Patient Education Provided     Education provided:   - Reviewed HEP and educated on mode frequency reps, and sets as well as when to stop TE.  Patient verbalzied understanding, performed return demonstration and with no adverse affects noted nor verbalized.        Written Home Exercises Provided: Patient instructed to cont prior HEP.  Exercises were reviewed and Paula was able to demonstrate them prior to the end of the session.  Paula demonstrated fair  understanding of the education provided.     See EMR under Media for exercises provided prior visit.    Assessment     Patient participated with PT to address her current deficits with balalnce proprioception, strength and activity haylee..  She has been progressing with noted improvements in her function.  She cont to required cueing for effective strengthening of her ankle foot in order to facilitate improved proprioception and balance.  Patient with minimal edema noted this day and no KT was applied and she did not verbalize any concern related to edema this session.  She is expected to improve and progress with cont PT Rx.        Paula is progressing well towards her goals.   Pt prognosis is Good.     Pt will continue to benefit from skilled outpatient physical therapy to address the deficits listed in the problem list box on initial evaluation, provide pt/family education and to maximize pt's level of independence in the home and community environment.     Pt's spiritual, cultural and educational needs considered and pt agreeable to plan of care and goals.        Anticipated barriers to physical therapy:  co morbidities, age, overweight       Goals:     STG  Weeks/Visits Date Established  Goal Status    1.Pt will report at worst pain of </= 6/10 to improve quality of life 4 weeks  6/10/2020    In progress 9/1/2020    2. Pt will report >/= 65/104 on ALAYNA to indicate improved functional mobility  4 weeks  6/10/2020    In progress 9/1/2020    3.Pt will report she is able to stand for >/= 45 min to improve standing tolerance in the kitchen  4 weeks  6/10/2020    In progress 9/1/2020    4.Pt will perform SLS on LLE for >/= 15 sec to decrease risk for falls  4 weeks  6/10/2020    Met 9/1/2020   5.Pt will demonstrate compliance and independence with HEP to improve therapy outcomes  4 weeks  6/10/2020    Met 7/14/2020      LTG Weeks/Visits Date Established  Goal Status    1.Pt will report at worst pain of </= 6/10 to improve quality of life 8 weeks 6/10/2020        2. Pt will report >/= 80/104 on ALAYNA to indicate improved functional mobility 8 weeks 6/10/2020           3.Pt will report she is able to stand for >/= 1 hour to improve standing tolerance in the kitchen  8 weeks 6/10/2020        4.Pt will perform SLS on LLE for >/= 20 sec to decrease risk for falls  8 weeks 6/10/2020        5.Pt will increase LLE strength to 5/5 to improve functional mobility  8 weeks 6/10/2020        6. Pt will navigate stairs with reciprocal gait pattern with 1 HR to improve stair navigation at home  8 weeks  6/10/2020                   Plan       Cont with current POC and progress as haylee progress SLS activity as haylee.         Aquiles Pickens, PT

## 2020-09-01 ENCOUNTER — CLINICAL SUPPORT (OUTPATIENT)
Dept: REHABILITATION | Facility: HOSPITAL | Age: 60
End: 2020-09-01
Payer: COMMERCIAL

## 2020-09-01 DIAGNOSIS — M72.2 PLANTAR FASCIITIS, LEFT: ICD-10-CM

## 2020-09-01 DIAGNOSIS — R52 TENDERNESS: ICD-10-CM

## 2020-09-01 DIAGNOSIS — R29.898 IMPAIRED FLEXIBILITY OF LOWER EXTREMITY: ICD-10-CM

## 2020-09-01 DIAGNOSIS — R29.898 DECREASED STRENGTH OF LOWER EXTREMITY: Primary | ICD-10-CM

## 2020-09-01 PROCEDURE — 97112 NEUROMUSCULAR REEDUCATION: CPT

## 2020-09-01 PROCEDURE — 97110 THERAPEUTIC EXERCISES: CPT

## 2020-09-03 ENCOUNTER — CLINICAL SUPPORT (OUTPATIENT)
Dept: REHABILITATION | Facility: HOSPITAL | Age: 60
End: 2020-09-03
Payer: COMMERCIAL

## 2020-09-03 DIAGNOSIS — M72.2 PLANTAR FASCIITIS, LEFT: ICD-10-CM

## 2020-09-03 DIAGNOSIS — R29.898 DECREASED STRENGTH OF LOWER EXTREMITY: Primary | ICD-10-CM

## 2020-09-03 DIAGNOSIS — R52 TENDERNESS: ICD-10-CM

## 2020-09-03 DIAGNOSIS — R29.898 IMPAIRED FLEXIBILITY OF LOWER EXTREMITY: ICD-10-CM

## 2020-09-03 PROCEDURE — 97112 NEUROMUSCULAR REEDUCATION: CPT

## 2020-09-03 PROCEDURE — 97530 THERAPEUTIC ACTIVITIES: CPT

## 2020-09-03 PROCEDURE — 97110 THERAPEUTIC EXERCISES: CPT

## 2020-09-03 NOTE — PROGRESS NOTES
[facility] OUTPATIENT  Physical Therapy Daily Treatment Note     Name: Paula ObregonNatanael  Clinic Number: [id number]    Therapy Diagnosis:   Encounter Diagnoses   Name Primary?    Decreased strength of lower extremity Yes    Tenderness     Impaired flexibility of lower extremity     Plantar fasciitis, left      Physician: Tanvir Cisneros III, *    Visit Date: 9/3/2020    Physician Orders: PT Eval and Treat  Medical Diagnosis from Referral: M76.72 (ICD-10-CM) - Peroneal tendinitis of left lower extremity      Evaluation Date: 6/10/2020  Authorization Period Expiration: 12/31/2020  Current Certification Period: 6/10/2020 - 7/31/2020  Plan of Care Expiration: updated POC 8/1/2020-9/25/2020 for 12 visits or 23 total    Visit # / Visits authorized: 15/ 40      Total Visit count: 16    PTA Visit: 0/5      POC Review due by: 9/11/2020      Time In: 0915  Time Out: 1000        Precautions: Standard      Subjective     Pt reports:  Patient relates no pain this day only some minor discomfort.  She adds that she cont to ice her ankle/foot at home and has been doing good overall .     She was intermittently compliant with home exercise program.    Response to previous treatment: some soreness  Functional change: as noted      Pain: 0/10  Location: left feet        Objective       Paula received therapeutic exercises to develop strength, endurance, ROM and flexibility for 14 minutes including:     · Nu Step L1  x 5 min held  · recumbent bike x 7 min L3  · Ankle four way with GTB 20 x   · Standing slant board gastroc stretch 60 sec x 2  · Seated hamstring stretch 60 sec  · heel raises on half foam 20 x 2  · Heel raise with inversion 10 x  · Arch doming x 1 min x 2 held  · Toe yoga 30 sec x 2 held  · Towel scrunches 60 sec x 3 held  · Marbles In/Ev 20 x 2 held    Paula participated in neuromuscular re-education activities to improve: Balance, Coordination and Proprioception for  20minutes. The following activities were included: KT taping     · SLS for balance with cues to maintain arch for duration.  · BAPs board DF/PF, IN/Ev, CW/CCW 30 sec x 2 each L3 held  · Wobble board A/P Bialterally 1 min x 2 each  · Romberg EC on foam 30 sec x 4  · SLS three cone tapping 5 x 6 (L in stance position)  · + BOSU 30 sec x 3  · + BOSU Laterally 30 sec   ·   Paula received the following manual therapy techniques: Joint mobs were applied to the: L ankle/foot for ankle DF/PF, IN/EV as well as to the A/P mobs to the distal tib fib joint for improved accessory motion and to facilitate DF/PF x  0 min.  NP    No KT today  Kinesiotaping ( for edema control)  Patient was screened for use of kinesiotape and was cleared for use. Pt. received kinesiotaping on L foot/ankle edema control with two I strips.  1. Has the patient ever had a reaction to the adhesive in bandaids? No:                                    2. Has the patient ever uses athletic/kinesiotape in the past? No  3. Is the patient hemodynamically impaired (PE, DVT, CHF, Kidney failure)? Yes/No: No  4. Can the PT/PTA apply the tape to your skin? Yes/No: Yes     Patient was instructed on duration to wear the tape, proper drying techniques for the tape, and to remove the tape if he/she has any skin irritation: including, but not exclusive to, redness/itchiness/discomfort. Patient verbalized understanding of these instructions.        Paula participated in dynamic functional therapeutic activities to improve functional performance for 10 minutes, including:    Ascended/descended 18 steps x 3 with an intermittent step to and reciprocating patterns as well with B HR use.  Patient with noted decreased R knee eccentric control due to R knee pain.  She denies any exacerbations of symptoms to her Left ankle/foot.      TM amb x 5 min at 2.4 mph and with no c/o.          Paula participated in gait training to improve functional mobility and safety for 0  minutes,  including:      Paula received the following direct contact modalities after being cleared for contraindications:Ultrasound:  Paula received ultrasound with 3MHz to manage pain and inflammation at 100 % duty cycle applied to the 1.5W/cm2  for a duration of 0 minutes to her L foot at her meta tarsal heads. Patient tolerated treatment well without adverse effects. Therapist was in attendance throughout intervention.     Paula received the following supervised modalities after being cleared for contradictions N/A    Paula received hot pack for 0 minutes to N/A.    Paula received cold pack for 10 minutes to L ankle foot for edema control.        Home Exercises Provided and Patient Education Provided     Education provided:   - Reviewed HEP and educated on mode frequency reps, and sets as well as when to stop TE.  Patient verbalzied understanding, performed return demonstration and with no adverse affects noted nor verbalized.        Written Home Exercises Provided: Patient instructed to cont prior HEP.  Exercises were reviewed and Paula was able to demonstrate them prior to the end of the session.  Paula demonstrated fair  understanding of the education provided.     See EMR under Media for exercises provided prior visit.    Assessment     Patient participated with PT to address her L ankle foot strength activity haylee proprioception and SLS.  She was able to perform her noted rx with no exacerbation in symptoms but did experience increased bunrning to her L arch due to fatigue which resolved appropriately with rest.  She remins challenged with SLS activities and their progressions but is progressing with improved function as noted with stair negotiation ans TM amb. without c/o.  She is expected to progress to her established goals with cont PT Rx.        Paula is progressing well towards her goals.   Pt prognosis is Good.     Pt will continue to benefit from skilled outpatient physical therapy to address the deficits  listed in the problem list box on initial evaluation, provide pt/family education and to maximize pt's level of independence in the home and community environment.     Pt's spiritual, cultural and educational needs considered and pt agreeable to plan of care and goals.       Anticipated barriers to physical therapy:  co morbidities, age, overweight       Goals:     STG  Weeks/Visits Date Established  Goal Status    1.Pt will report at worst pain of </= 6/10 to improve quality of life 4 weeks  6/10/2020    In progress 9/3/2020    2. Pt will report >/= 65/104 on ALAYNA to indicate improved functional mobility  4 weeks  6/10/2020    In progress 9/3/2020    3.Pt will report she is able to stand for >/= 45 min to improve standing tolerance in the kitchen  4 weeks  6/10/2020    In progress 9/3/2020    4.Pt will perform SLS on LLE for >/= 15 sec to decrease risk for falls  4 weeks  6/10/2020    Met 9/3/2020   5.Pt will demonstrate compliance and independence with HEP to improve therapy outcomes  4 weeks  6/10/2020    Met 7/14/2020      LTG Weeks/Visits Date Established  Goal Status    1.Pt will report at worst pain of </= 6/10 to improve quality of life 8 weeks 6/10/2020        2. Pt will report >/= 80/104 on ALAYNA to indicate improved functional mobility 8 weeks 6/10/2020           3.Pt will report she is able to stand for >/= 1 hour to improve standing tolerance in the kitchen  8 weeks 6/10/2020        4.Pt will perform SLS on LLE for >/= 20 sec to decrease risk for falls  8 weeks 6/10/2020        5.Pt will increase LLE strength to 5/5 to improve functional mobility  8 weeks 6/10/2020        6. Pt will navigate stairs with reciprocal gait pattern with 1 HR to improve stair navigation at home  8 weeks  6/10/2020                   Plan       Cont with current POC and progress as haylee progress SLS activity as haylee.         Aquiles Pickens, PT

## 2020-09-14 DIAGNOSIS — M79.672 PAIN OF LEFT HEEL: ICD-10-CM

## 2020-09-14 DIAGNOSIS — M79.672 LEFT FOOT PAIN: ICD-10-CM

## 2020-09-14 DIAGNOSIS — M76.72 PERONEAL TENDINITIS OF LEFT LOWER EXTREMITY: ICD-10-CM

## 2020-09-14 DIAGNOSIS — M72.2 PLANTAR FASCIITIS: ICD-10-CM

## 2020-09-14 RX ORDER — MELOXICAM 15 MG/1
15 TABLET ORAL DAILY
Qty: 30 TABLET | Refills: 0 | Status: SHIPPED | OUTPATIENT
Start: 2020-09-14 | End: 2022-05-11

## 2020-10-21 ENCOUNTER — TELEPHONE (OUTPATIENT)
Dept: FAMILY MEDICINE | Facility: CLINIC | Age: 60
End: 2020-10-21

## 2020-10-21 NOTE — TELEPHONE ENCOUNTER
Pt advised needs appointment.  ----- Message from Marycarmen Tavares MA sent at 10/21/2020  9:54 AM CDT -----  Lost rx     For all meds     Called into [facility]  for 90 days no refills.

## 2020-10-27 ENCOUNTER — OFFICE VISIT (OUTPATIENT)
Dept: FAMILY MEDICINE | Facility: CLINIC | Age: 60
End: 2020-10-27
Payer: COMMERCIAL

## 2020-10-27 VITALS
WEIGHT: 194 LBS | HEIGHT: 63 IN | HEART RATE: 76 BPM | DIASTOLIC BLOOD PRESSURE: 78 MMHG | SYSTOLIC BLOOD PRESSURE: 132 MMHG | OXYGEN SATURATION: 98 % | BODY MASS INDEX: 34.38 KG/M2 | TEMPERATURE: 98 F

## 2020-10-27 DIAGNOSIS — R40.0 SOMNOLENCE: ICD-10-CM

## 2020-10-27 DIAGNOSIS — I10 ESSENTIAL HYPERTENSION: Primary | ICD-10-CM

## 2020-10-27 DIAGNOSIS — M79.672 LEFT FOOT PAIN: ICD-10-CM

## 2020-10-27 DIAGNOSIS — K59.00 CONSTIPATION, UNSPECIFIED CONSTIPATION TYPE: ICD-10-CM

## 2020-10-27 DIAGNOSIS — M72.2 PLANTAR FASCIITIS: ICD-10-CM

## 2020-10-27 DIAGNOSIS — R06.83 SNORING: ICD-10-CM

## 2020-10-27 DIAGNOSIS — N95.1 MENOPAUSAL SYNDROME: ICD-10-CM

## 2020-10-27 DIAGNOSIS — F41.9 ANXIETY: ICD-10-CM

## 2020-10-27 DIAGNOSIS — M25.512 ACUTE PAIN OF LEFT SHOULDER: ICD-10-CM

## 2020-10-27 DIAGNOSIS — K21.9 GASTROESOPHAGEAL REFLUX DISEASE, UNSPECIFIED WHETHER ESOPHAGITIS PRESENT: ICD-10-CM

## 2020-10-27 PROCEDURE — 99214 PR OFFICE/OUTPT VISIT, EST, LEVL IV, 30-39 MIN: ICD-10-PCS | Mod: 25,S$GLB,, | Performed by: FAMILY MEDICINE

## 2020-10-27 PROCEDURE — 90686 IIV4 VACC NO PRSV 0.5 ML IM: CPT | Mod: S$GLB,,, | Performed by: FAMILY MEDICINE

## 2020-10-27 PROCEDURE — 90471 FLU VACCINE (QUAD) GREATER THAN OR EQUAL TO 3YO PRESERVATIVE FREE IM: ICD-10-PCS | Mod: S$GLB,,, | Performed by: FAMILY MEDICINE

## 2020-10-27 PROCEDURE — 99214 OFFICE O/P EST MOD 30 MIN: CPT | Mod: 25,S$GLB,, | Performed by: FAMILY MEDICINE

## 2020-10-27 PROCEDURE — 90471 IMMUNIZATION ADMIN: CPT | Mod: S$GLB,,, | Performed by: FAMILY MEDICINE

## 2020-10-27 PROCEDURE — 3078F DIAST BP <80 MM HG: CPT | Mod: CPTII,S$GLB,, | Performed by: FAMILY MEDICINE

## 2020-10-27 PROCEDURE — 3075F PR MOST RECENT SYSTOLIC BLOOD PRESS GE 130-139MM HG: ICD-10-PCS | Mod: CPTII,S$GLB,, | Performed by: FAMILY MEDICINE

## 2020-10-27 PROCEDURE — 3078F PR MOST RECENT DIASTOLIC BLOOD PRESSURE < 80 MM HG: ICD-10-PCS | Mod: CPTII,S$GLB,, | Performed by: FAMILY MEDICINE

## 2020-10-27 PROCEDURE — 3075F SYST BP GE 130 - 139MM HG: CPT | Mod: CPTII,S$GLB,, | Performed by: FAMILY MEDICINE

## 2020-10-27 PROCEDURE — 3008F BODY MASS INDEX DOCD: CPT | Mod: CPTII,S$GLB,, | Performed by: FAMILY MEDICINE

## 2020-10-27 PROCEDURE — 90686 FLU VACCINE (QUAD) GREATER THAN OR EQUAL TO 3YO PRESERVATIVE FREE IM: ICD-10-PCS | Mod: S$GLB,,, | Performed by: FAMILY MEDICINE

## 2020-10-27 PROCEDURE — 3008F PR BODY MASS INDEX (BMI) DOCUMENTED: ICD-10-PCS | Mod: CPTII,S$GLB,, | Performed by: FAMILY MEDICINE

## 2020-10-27 RX ORDER — LEVOCETIRIZINE DIHYDROCHLORIDE 5 MG/1
5 TABLET, FILM COATED ORAL NIGHTLY
Qty: 90 TABLET | Refills: 1 | Status: SHIPPED | OUTPATIENT
Start: 2020-10-27 | End: 2021-07-13

## 2020-10-27 RX ORDER — PREDNISONE 20 MG/1
20 TABLET ORAL 2 TIMES DAILY
Qty: 10 TABLET | Refills: 0 | Status: SHIPPED | OUTPATIENT
Start: 2020-10-27 | End: 2021-01-06

## 2020-10-27 RX ORDER — ESTRADIOL 1 MG/1
1 TABLET ORAL DAILY
Qty: 90 TABLET | Refills: 1 | Status: SHIPPED | OUTPATIENT
Start: 2020-10-27 | End: 2021-08-12

## 2020-10-27 RX ORDER — FLUTICASONE PROPIONATE 50 MCG
1 SPRAY, SUSPENSION (ML) NASAL DAILY
Qty: 16 G | Refills: 5 | Status: SHIPPED | OUTPATIENT
Start: 2020-10-27 | End: 2021-04-21 | Stop reason: SDUPTHER

## 2020-10-27 RX ORDER — ALPRAZOLAM 0.5 MG/1
0.5 TABLET ORAL 2 TIMES DAILY PRN
Qty: 30 TABLET | Refills: 1 | Status: SHIPPED | OUTPATIENT
Start: 2020-10-27 | End: 2021-05-28 | Stop reason: SDUPTHER

## 2020-10-27 RX ORDER — METOPROLOL TARTRATE 25 MG/1
25 TABLET, FILM COATED ORAL EVERY 12 HOURS
Qty: 180 TABLET | Refills: 1 | Status: SHIPPED | OUTPATIENT
Start: 2020-10-27 | End: 2021-05-14

## 2020-10-27 RX ORDER — IBUPROFEN 800 MG/1
TABLET ORAL
Qty: 90 TABLET | Refills: 1 | Status: SHIPPED | OUTPATIENT
Start: 2020-10-27 | End: 2021-05-28 | Stop reason: SDUPTHER

## 2020-10-27 RX ORDER — PANTOPRAZOLE SODIUM 40 MG/1
40 TABLET, DELAYED RELEASE ORAL DAILY
Qty: 90 TABLET | Refills: 1 | Status: SHIPPED | OUTPATIENT
Start: 2020-10-27 | End: 2021-05-14

## 2020-10-28 NOTE — PROGRESS NOTES
Needs flu shot today.  Some cramping and constipation.  Given tree Trevon but did started.  Magnesium does take care of the problem so she did not start the medication.  Gastroesophageal reflux disease bad even with medication.  No dysphagia.  Estrogen feels that she is unable to discontinue it side effects were too bad without it.  Her mammogram is current she will have it repeated next month.  Some allergic rhinitis problems.  Needs refill of medications for this.  Her colonoscopy is current.  Did done by Dr. Mora in July.  Recheck in 5 years.  Left shoulder is tender hurts to sleep.  Been going on for 2 weeks.  No known injury.  Says she wakes up gasping at night.  Also fatigue.  Snores badly.  Also some anxiety issues.  Needs refill of her Xanax.    Physical examination vital signs are noted.  Well-developed well-nourished male no acute distress overweight.  Alert oriented.  Neck is without bruit no adenopathy.  No thyromegaly.  Chest clear to auscultation no wheezes or crackles.  Heart regular rate rhythm without murmur gallop or rub.  Abdomen bowel sounds are positive soft nontender no hepatosplenomegaly no guarding or rebound.  Extremities no clubbing cyanosis or edema positive pedal pulses.    Impression hypertension controlled.  Gastroesophageal reflux disease.  Menopausal syndrome.  Anxiety.  Somnolence snoring.  Constipation.    Plan flu shot high-dose administered.  Xanax 0.5 daily maximum 30 with 1 refill.  Home sleep study.  Due to the awakening in the fatigue and snoring.  X-ray her left shoulder.  CBC CMP ordered.  Refilled all of her medications.  Prednisone 20 mg 2 daily for 5 days.  Follow-up in 3 months.

## 2020-11-02 ENCOUNTER — HOSPITAL ENCOUNTER (OUTPATIENT)
Dept: RADIOLOGY | Facility: HOSPITAL | Age: 60
Discharge: HOME | End: 2020-11-02
Attending: FAMILY MEDICINE
Payer: COMMERCIAL

## 2020-11-02 ENCOUNTER — TELEPHONE (OUTPATIENT)
Dept: FAMILY MEDICINE | Facility: CLINIC | Age: 60
End: 2020-11-02

## 2020-11-02 DIAGNOSIS — M25.512 ACUTE PAIN OF LEFT SHOULDER: ICD-10-CM

## 2020-11-02 PROCEDURE — 73030 X-RAY EXAM OF SHOULDER: CPT | Mod: TC,LT

## 2020-11-02 NOTE — TELEPHONE ENCOUNTER
----- Message from Benjamin Cardoso sent at 11/2/2020  2:50 PM CST -----  Contact: Self  Type:  RX Refill Request    Who Called:  Patient  Refill or New Rx:  Refill  RX Name and Strength:  spironolactone (ALDACTONE) 25 MG tablet  How is the patient currently taking it? (ex. 1XDay):  1x/day  Is this a 30 day or 90 day RX:  30  Preferred Pharmacy with phone number:    [facility] #[id number] [address] & [address] [address]  Phone: [phone] Fax: [phone]    Local or Mail Order:  local  Ordering Provider:  Amelia Romero Call Back Number:  [phone] (home)     Additional Information:  Patient states she also discussed issues with above medication with Dr. Cisneros. Would like to be contacted regarding an alternative.       MEDICATION CALLED OUT

## 2020-11-09 DIAGNOSIS — N64.4 MASTODYNIA: Primary | ICD-10-CM

## 2020-11-11 ENCOUNTER — OFFICE VISIT (OUTPATIENT)
Dept: URGENT CARE | Facility: CLINIC | Age: 60
End: 2020-11-11
Payer: COMMERCIAL

## 2020-11-11 VITALS
DIASTOLIC BLOOD PRESSURE: 72 MMHG | HEART RATE: 78 BPM | OXYGEN SATURATION: 97 % | TEMPERATURE: 98 F | RESPIRATION RATE: 15 BRPM | SYSTOLIC BLOOD PRESSURE: 154 MMHG

## 2020-11-11 DIAGNOSIS — Z20.822 COVID-19 RULED OUT: Primary | ICD-10-CM

## 2020-11-11 DIAGNOSIS — Z20.822 EXPOSURE TO COVID-19 VIRUS: ICD-10-CM

## 2020-11-11 LAB
CTP QC/QA: YES
SARS-COV-2 RDRP RESP QL NAA+PROBE: NEGATIVE

## 2020-11-11 PROCEDURE — 99213 PR OFFICE/OUTPT VISIT, EST, LEVL III, 20-29 MIN: ICD-10-PCS | Mod: S$GLB,,, | Performed by: NURSE PRACTITIONER

## 2020-11-11 PROCEDURE — U0002: ICD-10-PCS | Mod: QW,S$GLB,, | Performed by: NURSE PRACTITIONER

## 2020-11-11 PROCEDURE — 99213 OFFICE O/P EST LOW 20 MIN: CPT | Mod: S$GLB,,, | Performed by: NURSE PRACTITIONER

## 2020-11-11 PROCEDURE — U0002 COVID-19 LAB TEST NON-CDC: HCPCS | Mod: QW,S$GLB,, | Performed by: NURSE PRACTITIONER

## 2020-11-11 NOTE — PROGRESS NOTES
Subjective:       Patient ID: Paula Hanna is a 60 y.o. female.    Vitals:  temperature is 97.8 °F (36.6 °C). Her blood pressure is 154/72 (abnormal) and her pulse is 78. Her respiration is 15 and oxygen saturation is 97%.     Chief Complaint: No chief complaint on file.    Paula Hanna is a 60 year old female presenting to the clinic requesting COVID testing. She was recently exposed to a positive family member and reports a headache that is similar to previous headaches. SHe has had no fever, loss of smell/taste.       Constitution: Negative for chills, fatigue and fever.   HENT: Negative for congestion and sore throat.    Neck: Negative for painful lymph nodes.   Cardiovascular: Negative for chest pain and leg swelling.   Eyes: Negative for double vision and blurred vision.   Respiratory: Negative for cough and shortness of breath.    Gastrointestinal: Negative for nausea, vomiting and diarrhea.   Genitourinary: Negative for dysuria, frequency, urgency and history of kidney stones.   Musculoskeletal: Negative for joint pain, joint swelling, muscle cramps and muscle ache.   Skin: Negative for color change, pale, rash and bruising.   Allergic/Immunologic: Negative for seasonal allergies.   Neurological: Positive for headaches. Negative for dizziness, history of vertigo, light-headedness and passing out.   Hematologic/Lymphatic: Negative for swollen lymph nodes.   Psychiatric/Behavioral: Negative for nervous/anxious, sleep disturbance and depression. The patient is not nervous/anxious.        Objective:      Physical Exam   Constitutional: She is oriented to person, place, and time. She appears well-developed. She is cooperative.  Non-toxic appearance. She does not appear ill. No distress.   HENT:   Head: Normocephalic and atraumatic.   Ears:   Right Ear: Hearing normal.   Left Ear: Hearing normal.   Nose: Nose normal. No mucosal edema, rhinorrhea or nasal deformity. No epistaxis. Right sinus exhibits no  maxillary sinus tenderness and no frontal sinus tenderness. Left sinus exhibits no maxillary sinus tenderness and no frontal sinus tenderness.   Mouth/Throat: Uvula is midline, oropharynx is clear and moist and mucous membranes are normal. No trismus in the jaw. Normal dentition. No uvula swelling. No posterior oropharyngeal erythema.   Eyes: Conjunctivae and lids are normal. Right eye exhibits no discharge. Left eye exhibits no discharge. No scleral icterus.   Neck: Trachea normal, normal range of motion, full passive range of motion without pain and phonation normal. Neck supple.   Cardiovascular: Normal rate, regular rhythm, normal heart sounds and normal pulses.   Pulmonary/Chest: Effort normal and breath sounds normal. No respiratory distress.   Abdominal: Normal appearance. She exhibits no pulsatile midline mass.   Musculoskeletal: Normal range of motion.         General: No deformity.   Neurological: She is alert and oriented to person, place, and time. She exhibits normal muscle tone. Coordination normal.   Skin: Skin is warm, dry, intact, not diaphoretic and not pale. Psychiatric: Her speech is normal and behavior is normal. Judgment and thought content normal.   Nursing note and vitals reviewed.        Assessment:       1. COVID-19 ruled out    2. Exposure to COVID-19 virus        Plan:       Patient appears well hydrated and nontoxic. In no distress in clinic. COVID negative. Advised to follow up as needed.   COVID-19 ruled out    Exposure to COVID-19 virus  -     POCT COVID-19 Rapid Screening

## 2020-12-08 ENCOUNTER — HOSPITAL ENCOUNTER (OUTPATIENT)
Dept: RADIOLOGY | Facility: HOSPITAL | Age: 60
Discharge: HOME | End: 2020-12-08
Attending: OBSTETRICS & GYNECOLOGY
Payer: COMMERCIAL

## 2020-12-08 DIAGNOSIS — N64.4 MASTODYNIA: ICD-10-CM

## 2020-12-08 PROCEDURE — 77062 BREAST TOMOSYNTHESIS BI: CPT | Mod: TC,PO

## 2020-12-15 NOTE — PROCEDURES
"Incision & Drainage  Date/Time: 3/26/2019 11:00 AM  Performed by: Samanta Hawkins MD  Authorized by: Samanta Hawkins MD     Time out: Immediately prior to procedure a "time out" was called to verify the correct patient, procedure, equipment, support staff and site/side marked as required.    Consent Done?:  Yes (Written)    Type:  Hematoma  Body area:  Anogenital  Location details:  Perianal  Anesthesia:  Local infiltration  Local anesthetic: lidocaine 1% with epinephrine  Anesthetic total (ml):  1  Scalpel size:  11  Incision type:  Single straight  Complexity:  Simple  Drainage:  Bloody  Drainage amount:  Scant  Wound treatment:  Clot removal, incision, drainage and wound left open  Packing material:  None  Patient tolerance:  Patient tolerated the procedure well with no immediate complications    I&D Thrombosed Ext Hem without complications      " no

## 2021-01-06 ENCOUNTER — OFFICE VISIT (OUTPATIENT)
Dept: ORTHOPEDICS | Facility: CLINIC | Age: 61
End: 2021-01-06
Payer: COMMERCIAL

## 2021-01-06 VITALS
SYSTOLIC BLOOD PRESSURE: 124 MMHG | HEIGHT: 63 IN | DIASTOLIC BLOOD PRESSURE: 78 MMHG | BODY MASS INDEX: 34.55 KG/M2 | HEART RATE: 74 BPM | WEIGHT: 195 LBS

## 2021-01-06 DIAGNOSIS — M51.36 DISC DEGENERATION, LUMBAR: Primary | ICD-10-CM

## 2021-01-06 DIAGNOSIS — M54.16 LUMBAR RADICULITIS: ICD-10-CM

## 2021-01-06 DIAGNOSIS — M47.816 LUMBAR FACET ARTHROPATHY: ICD-10-CM

## 2021-01-06 PROCEDURE — 99213 OFFICE O/P EST LOW 20 MIN: CPT | Mod: S$GLB,,, | Performed by: ORTHOPAEDIC SURGERY

## 2021-01-06 PROCEDURE — 3008F PR BODY MASS INDEX (BMI) DOCUMENTED: ICD-10-PCS | Mod: S$GLB,,, | Performed by: ORTHOPAEDIC SURGERY

## 2021-01-06 PROCEDURE — 3008F BODY MASS INDEX DOCD: CPT | Mod: S$GLB,,, | Performed by: ORTHOPAEDIC SURGERY

## 2021-01-06 PROCEDURE — 3074F PR MOST RECENT SYSTOLIC BLOOD PRESSURE < 130 MM HG: ICD-10-PCS | Mod: S$GLB,,, | Performed by: ORTHOPAEDIC SURGERY

## 2021-01-06 PROCEDURE — 3074F SYST BP LT 130 MM HG: CPT | Mod: S$GLB,,, | Performed by: ORTHOPAEDIC SURGERY

## 2021-01-06 PROCEDURE — 1125F PR PAIN SEVERITY QUANTIFIED, PAIN PRESENT: ICD-10-PCS | Mod: S$GLB,,, | Performed by: ORTHOPAEDIC SURGERY

## 2021-01-06 PROCEDURE — 3078F PR MOST RECENT DIASTOLIC BLOOD PRESSURE < 80 MM HG: ICD-10-PCS | Mod: S$GLB,,, | Performed by: ORTHOPAEDIC SURGERY

## 2021-01-06 PROCEDURE — 3078F DIAST BP <80 MM HG: CPT | Mod: S$GLB,,, | Performed by: ORTHOPAEDIC SURGERY

## 2021-01-06 PROCEDURE — 99213 PR OFFICE/OUTPT VISIT, EST, LEVL III, 20-29 MIN: ICD-10-PCS | Mod: S$GLB,,, | Performed by: ORTHOPAEDIC SURGERY

## 2021-01-06 PROCEDURE — 1125F AMNT PAIN NOTED PAIN PRSNT: CPT | Mod: S$GLB,,, | Performed by: ORTHOPAEDIC SURGERY

## 2021-01-06 RX ORDER — GABAPENTIN 300 MG/1
300 CAPSULE ORAL NIGHTLY
Qty: 30 CAPSULE | Refills: 2 | Status: SHIPPED | OUTPATIENT
Start: 2021-01-06 | End: 2021-10-19 | Stop reason: SDUPTHER

## 2021-01-06 RX ORDER — METHOCARBAMOL 750 MG/1
750 TABLET, FILM COATED ORAL 3 TIMES DAILY
Qty: 90 TABLET | Refills: 2 | Status: SHIPPED | OUTPATIENT
Start: 2021-01-06 | End: 2021-04-06

## 2021-01-15 ENCOUNTER — HOSPITAL ENCOUNTER (OUTPATIENT)
Dept: RADIOLOGY | Facility: HOSPITAL | Age: 61
Discharge: HOME | End: 2021-01-15
Attending: ORTHOPAEDIC SURGERY
Payer: COMMERCIAL

## 2021-01-15 DIAGNOSIS — M51.36 DISC DEGENERATION, LUMBAR: ICD-10-CM

## 2021-01-15 DIAGNOSIS — M47.816 LUMBAR FACET ARTHROPATHY: ICD-10-CM

## 2021-01-15 DIAGNOSIS — M54.16 LUMBAR RADICULITIS: ICD-10-CM

## 2021-01-15 PROCEDURE — 72148 MRI LUMBAR SPINE W/O DYE: CPT | Mod: TC,PO

## 2021-01-24 ENCOUNTER — OFFICE VISIT (OUTPATIENT)
Dept: URGENT CARE | Facility: CLINIC | Age: 61
End: 2021-01-24
Payer: COMMERCIAL

## 2021-01-24 VITALS
OXYGEN SATURATION: 99 % | RESPIRATION RATE: 16 BRPM | HEART RATE: 78 BPM | TEMPERATURE: 98 F | SYSTOLIC BLOOD PRESSURE: 154 MMHG | DIASTOLIC BLOOD PRESSURE: 93 MMHG

## 2021-01-24 DIAGNOSIS — Z20.822 ENCOUNTER FOR LABORATORY TESTING FOR COVID-19 VIRUS: ICD-10-CM

## 2021-01-24 PROCEDURE — 99214 OFFICE O/P EST MOD 30 MIN: CPT | Mod: S$GLB,,, | Performed by: NURSE PRACTITIONER

## 2021-01-24 PROCEDURE — U0003 INFECTIOUS AGENT DETECTION BY NUCLEIC ACID (DNA OR RNA); SEVERE ACUTE RESPIRATORY SYNDROME CORONAVIRUS 2 (SARS-COV-2) (CORONAVIRUS DISEASE [COVID-19]), AMPLIFIED PROBE TECHNIQUE, MAKING USE OF HIGH THROUGHPUT TECHNOLOGIES AS DESCRIBED BY CMS-2020-01-R: HCPCS

## 2021-01-24 PROCEDURE — 99214 PR OFFICE/OUTPT VISIT, EST, LEVL IV, 30-39 MIN: ICD-10-PCS | Mod: S$GLB,,, | Performed by: NURSE PRACTITIONER

## 2021-01-25 LAB — SARS-COV-2 RNA RESP QL NAA+PROBE: NOT DETECTED

## 2021-01-26 ENCOUNTER — TELEPHONE (OUTPATIENT)
Dept: FAMILY MEDICINE | Facility: CLINIC | Age: 61
End: 2021-01-26

## 2021-01-26 ENCOUNTER — PATIENT OUTREACH (OUTPATIENT)
Dept: ADMINISTRATIVE | Facility: HOSPITAL | Age: 61
End: 2021-01-26

## 2021-01-26 ENCOUNTER — HOSPITAL ENCOUNTER (OUTPATIENT)
Dept: RADIOLOGY | Facility: HOSPITAL | Age: 61
Discharge: HOME | End: 2021-01-26
Attending: FAMILY MEDICINE
Payer: COMMERCIAL

## 2021-01-26 ENCOUNTER — PATIENT MESSAGE (OUTPATIENT)
Dept: FAMILY MEDICINE | Facility: CLINIC | Age: 61
End: 2021-01-26

## 2021-01-26 ENCOUNTER — OFFICE VISIT (OUTPATIENT)
Dept: FAMILY MEDICINE | Facility: CLINIC | Age: 61
End: 2021-01-26
Payer: COMMERCIAL

## 2021-01-26 VITALS
SYSTOLIC BLOOD PRESSURE: 184 MMHG | TEMPERATURE: 98 F | BODY MASS INDEX: 34.19 KG/M2 | DIASTOLIC BLOOD PRESSURE: 102 MMHG | HEART RATE: 72 BPM | WEIGHT: 193 LBS

## 2021-01-26 DIAGNOSIS — R10.32 LEFT LOWER QUADRANT ABDOMINAL PAIN: Primary | ICD-10-CM

## 2021-01-26 DIAGNOSIS — I10 HYPERTENSION, ESSENTIAL: Primary | ICD-10-CM

## 2021-01-26 DIAGNOSIS — R53.83 FATIGUE, UNSPECIFIED TYPE: ICD-10-CM

## 2021-01-26 DIAGNOSIS — R10.32 LEFT LOWER QUADRANT PAIN: ICD-10-CM

## 2021-01-26 DIAGNOSIS — I10 HYPERTENSION, ESSENTIAL: ICD-10-CM

## 2021-01-26 LAB
BILIRUB SERPL-MCNC: ABNORMAL MG/DL
BLOOD URINE, POC: ABNORMAL
COLOR, POC UA: YELLOW
GLUCOSE UR QL STRIP: ABNORMAL
KETONES UR QL STRIP: ABNORMAL
LEUKOCYTE ESTERASE URINE, POC: ABNORMAL
NITRITE, POC UA: ABNORMAL
PH, POC UA: 5.5
PROTEIN, POC: ABNORMAL
SPECIFIC GRAVITY, POC UA: 1.01
UROBILINOGEN, POC UA: ABNORMAL

## 2021-01-26 PROCEDURE — 3008F BODY MASS INDEX DOCD: CPT | Mod: CPTII,S$GLB,, | Performed by: FAMILY MEDICINE

## 2021-01-26 PROCEDURE — 3008F PR BODY MASS INDEX (BMI) DOCUMENTED: ICD-10-PCS | Mod: CPTII,S$GLB,, | Performed by: FAMILY MEDICINE

## 2021-01-26 PROCEDURE — 25500020 PHARM REV CODE 255: Performed by: FAMILY MEDICINE

## 2021-01-26 PROCEDURE — 99214 PR OFFICE/OUTPT VISIT, EST, LEVL IV, 30-39 MIN: ICD-10-PCS | Mod: 25,S$GLB,, | Performed by: FAMILY MEDICINE

## 2021-01-26 PROCEDURE — 99214 OFFICE O/P EST MOD 30 MIN: CPT | Mod: 25,S$GLB,, | Performed by: FAMILY MEDICINE

## 2021-01-26 PROCEDURE — 81003 POCT URINALYSIS W/O SCOPE: ICD-10-PCS | Mod: QW,S$GLB,, | Performed by: FAMILY MEDICINE

## 2021-01-26 PROCEDURE — 74177 CT ABD & PELVIS W/CONTRAST: CPT | Mod: TC

## 2021-01-26 PROCEDURE — 1126F AMNT PAIN NOTED NONE PRSNT: CPT | Mod: S$GLB,,, | Performed by: FAMILY MEDICINE

## 2021-01-26 PROCEDURE — 3080F DIAST BP >= 90 MM HG: CPT | Mod: CPTII,S$GLB,, | Performed by: FAMILY MEDICINE

## 2021-01-26 PROCEDURE — 81003 URINALYSIS AUTO W/O SCOPE: CPT | Mod: QW,S$GLB,, | Performed by: FAMILY MEDICINE

## 2021-01-26 PROCEDURE — 3077F SYST BP >= 140 MM HG: CPT | Mod: CPTII,S$GLB,, | Performed by: FAMILY MEDICINE

## 2021-01-26 PROCEDURE — 3080F PR MOST RECENT DIASTOLIC BLOOD PRESSURE >= 90 MM HG: ICD-10-PCS | Mod: CPTII,S$GLB,, | Performed by: FAMILY MEDICINE

## 2021-01-26 PROCEDURE — 1126F PR PAIN SEVERITY QUANTIFIED, NO PAIN PRESENT: ICD-10-PCS | Mod: S$GLB,,, | Performed by: FAMILY MEDICINE

## 2021-01-26 PROCEDURE — 3077F PR MOST RECENT SYSTOLIC BLOOD PRESSURE >= 140 MM HG: ICD-10-PCS | Mod: CPTII,S$GLB,, | Performed by: FAMILY MEDICINE

## 2021-01-26 RX ORDER — CLONIDINE HYDROCHLORIDE 0.1 MG/1
0.1 TABLET ORAL 3 TIMES DAILY
Qty: 30 TABLET | Refills: 0 | Status: SHIPPED | OUTPATIENT
Start: 2021-01-26 | End: 2021-05-16

## 2021-01-26 RX ORDER — TIZANIDINE 4 MG/1
TABLET ORAL
COMMUNITY
End: 2021-05-16

## 2021-01-26 RX ADMIN — IOHEXOL 100 ML: 350 INJECTION, SOLUTION INTRAVENOUS at 05:01

## 2021-01-27 ENCOUNTER — TELEPHONE (OUTPATIENT)
Dept: FAMILY MEDICINE | Facility: CLINIC | Age: 61
End: 2021-01-27

## 2021-01-28 ENCOUNTER — PATIENT OUTREACH (OUTPATIENT)
Dept: ADMINISTRATIVE | Facility: HOSPITAL | Age: 61
End: 2021-01-28

## 2021-04-21 ENCOUNTER — OFFICE VISIT (OUTPATIENT)
Dept: FAMILY MEDICINE | Facility: CLINIC | Age: 61
End: 2021-04-21
Payer: COMMERCIAL

## 2021-04-21 VITALS
OXYGEN SATURATION: 99 % | SYSTOLIC BLOOD PRESSURE: 130 MMHG | HEIGHT: 63 IN | TEMPERATURE: 98 F | DIASTOLIC BLOOD PRESSURE: 76 MMHG | HEART RATE: 73 BPM | WEIGHT: 194 LBS | BODY MASS INDEX: 34.38 KG/M2

## 2021-04-21 DIAGNOSIS — R53.83 FATIGUE, UNSPECIFIED TYPE: ICD-10-CM

## 2021-04-21 DIAGNOSIS — I10 HYPERTENSION, ESSENTIAL: Primary | ICD-10-CM

## 2021-04-21 DIAGNOSIS — J06.9 UPPER RESPIRATORY TRACT INFECTION, UNSPECIFIED TYPE: ICD-10-CM

## 2021-04-21 DIAGNOSIS — G47.09 OTHER INSOMNIA: ICD-10-CM

## 2021-04-21 PROCEDURE — 1126F PR PAIN SEVERITY QUANTIFIED, NO PAIN PRESENT: ICD-10-PCS | Mod: S$GLB,,, | Performed by: NURSE PRACTITIONER

## 2021-04-21 PROCEDURE — 3078F PR MOST RECENT DIASTOLIC BLOOD PRESSURE < 80 MM HG: ICD-10-PCS | Mod: CPTII,S$GLB,, | Performed by: NURSE PRACTITIONER

## 2021-04-21 PROCEDURE — 99499 UNLISTED E&M SERVICE: CPT | Mod: S$GLB,,, | Performed by: NURSE PRACTITIONER

## 2021-04-21 PROCEDURE — 3075F SYST BP GE 130 - 139MM HG: CPT | Mod: CPTII,S$GLB,, | Performed by: NURSE PRACTITIONER

## 2021-04-21 PROCEDURE — 1126F AMNT PAIN NOTED NONE PRSNT: CPT | Mod: S$GLB,,, | Performed by: NURSE PRACTITIONER

## 2021-04-21 PROCEDURE — 99214 PR OFFICE/OUTPT VISIT, EST, LEVL IV, 30-39 MIN: ICD-10-PCS | Mod: S$GLB,,, | Performed by: NURSE PRACTITIONER

## 2021-04-21 PROCEDURE — 3078F DIAST BP <80 MM HG: CPT | Mod: CPTII,S$GLB,, | Performed by: NURSE PRACTITIONER

## 2021-04-21 PROCEDURE — 99499 RISK ADDL DX/OHS AUDIT: ICD-10-PCS | Mod: S$GLB,,, | Performed by: NURSE PRACTITIONER

## 2021-04-21 PROCEDURE — 99214 OFFICE O/P EST MOD 30 MIN: CPT | Mod: S$GLB,,, | Performed by: NURSE PRACTITIONER

## 2021-04-21 PROCEDURE — 3008F PR BODY MASS INDEX (BMI) DOCUMENTED: ICD-10-PCS | Mod: CPTII,S$GLB,, | Performed by: NURSE PRACTITIONER

## 2021-04-21 PROCEDURE — 3008F BODY MASS INDEX DOCD: CPT | Mod: CPTII,S$GLB,, | Performed by: NURSE PRACTITIONER

## 2021-04-21 PROCEDURE — 3075F PR MOST RECENT SYSTOLIC BLOOD PRESS GE 130-139MM HG: ICD-10-PCS | Mod: CPTII,S$GLB,, | Performed by: NURSE PRACTITIONER

## 2021-04-21 RX ORDER — FLUTICASONE PROPIONATE 50 MCG
1 SPRAY, SUSPENSION (ML) NASAL DAILY
Qty: 16 G | Refills: 5 | Status: SHIPPED | OUTPATIENT
Start: 2021-04-21 | End: 2022-09-28 | Stop reason: SDUPTHER

## 2021-04-21 RX ORDER — AZELASTINE 1 MG/ML
1 SPRAY, METERED NASAL 2 TIMES DAILY
Qty: 30 ML | Refills: 3 | Status: SHIPPED | OUTPATIENT
Start: 2021-04-21 | End: 2021-10-19 | Stop reason: SDUPTHER

## 2021-04-21 RX ORDER — PREDNISONE 20 MG/1
TABLET ORAL
Qty: 7 TABLET | Refills: 0 | Status: SHIPPED | OUTPATIENT
Start: 2021-04-21 | End: 2021-05-16

## 2021-04-21 RX ORDER — SPIRONOLACTONE 25 MG/1
25 TABLET ORAL EVERY OTHER DAY
Qty: 45 TABLET | Refills: 1 | Status: SHIPPED | OUTPATIENT
Start: 2021-04-21 | End: 2021-10-19 | Stop reason: SDUPTHER

## 2021-04-21 RX ORDER — AZITHROMYCIN 250 MG/1
TABLET, FILM COATED ORAL
Qty: 6 TABLET | Refills: 0 | Status: SHIPPED | OUTPATIENT
Start: 2021-04-21 | End: 2021-04-26

## 2021-04-28 DIAGNOSIS — F41.9 ANXIETY: ICD-10-CM

## 2021-04-29 ENCOUNTER — PATIENT MESSAGE (OUTPATIENT)
Dept: RESEARCH | Facility: HOSPITAL | Age: 61
End: 2021-04-29

## 2021-05-03 RX ORDER — ALPRAZOLAM 0.5 MG/1
0.5 TABLET ORAL 2 TIMES DAILY PRN
Qty: 30 TABLET | Refills: 1 | OUTPATIENT
Start: 2021-05-03

## 2021-05-16 ENCOUNTER — ANESTHESIA EVENT (OUTPATIENT)
Dept: SURGERY | Facility: HOSPITAL | Age: 61
End: 2021-05-16
Payer: COMMERCIAL

## 2021-05-16 ENCOUNTER — ANESTHESIA (OUTPATIENT)
Dept: SURGERY | Facility: HOSPITAL | Age: 61
End: 2021-05-16
Payer: COMMERCIAL

## 2021-05-16 ENCOUNTER — HOSPITAL ENCOUNTER (OUTPATIENT)
Facility: HOSPITAL | Age: 61
Discharge: HOME | End: 2021-05-18
Attending: EMERGENCY MEDICINE | Admitting: INTERNAL MEDICINE
Payer: COMMERCIAL

## 2021-05-16 DIAGNOSIS — R07.9 CHEST PAIN: ICD-10-CM

## 2021-05-16 DIAGNOSIS — Z01.810 PREOP CARDIOVASCULAR EXAM: ICD-10-CM

## 2021-05-16 DIAGNOSIS — K81.0 ACUTE CHOLECYSTITIS: Primary | ICD-10-CM

## 2021-05-16 DIAGNOSIS — K80.00 CALCULUS OF GALLBLADDER WITH ACUTE CHOLECYSTITIS WITHOUT OBSTRUCTION: ICD-10-CM

## 2021-05-16 LAB
ALBUMIN SERPL BCP-MCNC: 4.1 G/DL (ref 3.5–5.2)
ALP SERPL-CCNC: 100 U/L (ref 55–135)
ALT SERPL W/O P-5'-P-CCNC: 118 U/L (ref 10–44)
ANION GAP SERPL CALC-SCNC: 12 MMOL/L (ref 8–16)
AST SERPL-CCNC: 246 U/L (ref 10–40)
BASOPHILS # BLD AUTO: 0.04 K/UL (ref 0–0.2)
BASOPHILS NFR BLD: 0.8 % (ref 0–1.9)
BILIRUB SERPL-MCNC: 1.6 MG/DL (ref 0.1–1)
BILIRUB UR QL STRIP: NEGATIVE
BUN SERPL-MCNC: 12 MG/DL (ref 8–23)
CALCIUM SERPL-MCNC: 9.4 MG/DL (ref 8.7–10.5)
CHLORIDE SERPL-SCNC: 102 MMOL/L (ref 95–110)
CLARITY UR: CLEAR
CO2 SERPL-SCNC: 22 MMOL/L (ref 23–29)
COLOR UR: YELLOW
CREAT SERPL-MCNC: 0.8 MG/DL (ref 0.5–1.4)
DIFFERENTIAL METHOD: NORMAL
EOSINOPHIL # BLD AUTO: 0.1 K/UL (ref 0–0.5)
EOSINOPHIL NFR BLD: 1.2 % (ref 0–8)
ERYTHROCYTE [DISTWIDTH] IN BLOOD BY AUTOMATED COUNT: 14.3 % (ref 11.5–14.5)
EST. GFR  (AFRICAN AMERICAN): >60 ML/MIN/1.73 M^2
EST. GFR  (NON AFRICAN AMERICAN): >60 ML/MIN/1.73 M^2
GLUCOSE SERPL-MCNC: 141 MG/DL (ref 70–110)
GLUCOSE UR QL STRIP: NEGATIVE
HCT VFR BLD AUTO: 38.6 % (ref 37–48.5)
HGB BLD-MCNC: 12.7 G/DL (ref 12–16)
HGB UR QL STRIP: NEGATIVE
IMM GRANULOCYTES # BLD AUTO: 0.01 K/UL (ref 0–0.04)
IMM GRANULOCYTES NFR BLD AUTO: 0.2 % (ref 0–0.5)
INR PPP: 1.1
KETONES UR QL STRIP: NEGATIVE
LEUKOCYTE ESTERASE UR QL STRIP: NEGATIVE
LIPASE SERPL-CCNC: 162 U/L (ref 4–60)
LYMPHOCYTES # BLD AUTO: 1.5 K/UL (ref 1–4.8)
LYMPHOCYTES NFR BLD: 30 % (ref 18–48)
MCH RBC QN AUTO: 28.9 PG (ref 27–31)
MCHC RBC AUTO-ENTMCNC: 32.9 G/DL (ref 32–36)
MCV RBC AUTO: 88 FL (ref 82–98)
MONOCYTES # BLD AUTO: 0.4 K/UL (ref 0.3–1)
MONOCYTES NFR BLD: 7.8 % (ref 4–15)
NEUTROPHILS # BLD AUTO: 3.1 K/UL (ref 1.8–7.7)
NEUTROPHILS NFR BLD: 60 % (ref 38–73)
NITRITE UR QL STRIP: NEGATIVE
NRBC BLD-RTO: 0 /100 WBC
PH UR STRIP: 8 [PH] (ref 5–8)
PLATELET # BLD AUTO: 211 K/UL (ref 150–450)
PMV BLD AUTO: 10.7 FL (ref 9.2–12.9)
POTASSIUM SERPL-SCNC: 4.1 MMOL/L (ref 3.5–5.1)
PROT SERPL-MCNC: 7.3 G/DL (ref 6–8.4)
PROT UR QL STRIP: NEGATIVE
PROTHROMBIN TIME: 13.4 SEC (ref 11.8–14.3)
RBC # BLD AUTO: 4.39 M/UL (ref 4–5.4)
SARS-COV-2 RDRP RESP QL NAA+PROBE: NEGATIVE
SODIUM SERPL-SCNC: 136 MMOL/L (ref 136–145)
SP GR UR STRIP: 1 (ref 1–1.03)
URN SPEC COLLECT METH UR: NORMAL
UROBILINOGEN UR STRIP-ACNC: NEGATIVE EU/DL
WBC # BLD AUTO: 5.13 K/UL (ref 3.9–12.7)

## 2021-05-16 PROCEDURE — 27000671 HC TUBING MICROBORE EXT: Performed by: ANESTHESIOLOGY

## 2021-05-16 PROCEDURE — 71000039 HC RECOVERY, EACH ADD'L HOUR: Performed by: STUDENT IN AN ORGANIZED HEALTH CARE EDUCATION/TRAINING PROGRAM

## 2021-05-16 PROCEDURE — 63600175 PHARM REV CODE 636 W HCPCS: Performed by: ANESTHESIOLOGY

## 2021-05-16 PROCEDURE — 25000003 PHARM REV CODE 250: Performed by: NURSE ANESTHETIST, CERTIFIED REGISTERED

## 2021-05-16 PROCEDURE — 27201107 HC STYLET, STANDARD: Performed by: ANESTHESIOLOGY

## 2021-05-16 PROCEDURE — G0378 HOSPITAL OBSERVATION PER HR: HCPCS

## 2021-05-16 PROCEDURE — 25000003 PHARM REV CODE 250: Performed by: INTERNAL MEDICINE

## 2021-05-16 PROCEDURE — 36000708 HC OR TIME LEV III 1ST 15 MIN: Performed by: STUDENT IN AN ORGANIZED HEALTH CARE EDUCATION/TRAINING PROGRAM

## 2021-05-16 PROCEDURE — 25000003 PHARM REV CODE 250: Performed by: EMERGENCY MEDICINE

## 2021-05-16 PROCEDURE — 47562 PR LAP,CHOLECYSTECTOMY: ICD-10-PCS | Mod: ,,, | Performed by: STUDENT IN AN ORGANIZED HEALTH CARE EDUCATION/TRAINING PROGRAM

## 2021-05-16 PROCEDURE — 71000033 HC RECOVERY, INTIAL HOUR: Performed by: STUDENT IN AN ORGANIZED HEALTH CARE EDUCATION/TRAINING PROGRAM

## 2021-05-16 PROCEDURE — 63600175 PHARM REV CODE 636 W HCPCS: Performed by: NURSE ANESTHETIST, CERTIFIED REGISTERED

## 2021-05-16 PROCEDURE — 96376 TX/PRO/DX INJ SAME DRUG ADON: CPT | Mod: 59

## 2021-05-16 PROCEDURE — 85025 COMPLETE CBC W/AUTO DIFF WBC: CPT | Performed by: EMERGENCY MEDICINE

## 2021-05-16 PROCEDURE — 96375 TX/PRO/DX INJ NEW DRUG ADDON: CPT | Mod: 59

## 2021-05-16 PROCEDURE — 37000008 HC ANESTHESIA 1ST 15 MINUTES: Performed by: STUDENT IN AN ORGANIZED HEALTH CARE EDUCATION/TRAINING PROGRAM

## 2021-05-16 PROCEDURE — 37000009 HC ANESTHESIA EA ADD 15 MINS: Performed by: STUDENT IN AN ORGANIZED HEALTH CARE EDUCATION/TRAINING PROGRAM

## 2021-05-16 PROCEDURE — 99285 EMERGENCY DEPT VISIT HI MDM: CPT | Mod: 25

## 2021-05-16 PROCEDURE — 36000709 HC OR TIME LEV III EA ADD 15 MIN: Performed by: STUDENT IN AN ORGANIZED HEALTH CARE EDUCATION/TRAINING PROGRAM

## 2021-05-16 PROCEDURE — 25000003 PHARM REV CODE 250: Performed by: STUDENT IN AN ORGANIZED HEALTH CARE EDUCATION/TRAINING PROGRAM

## 2021-05-16 PROCEDURE — 96375 TX/PRO/DX INJ NEW DRUG ADDON: CPT

## 2021-05-16 PROCEDURE — 93010 ELECTROCARDIOGRAM REPORT: CPT | Mod: ,,, | Performed by: INTERNAL MEDICINE

## 2021-05-16 PROCEDURE — 93005 ELECTROCARDIOGRAM TRACING: CPT | Mod: 59 | Performed by: INTERNAL MEDICINE

## 2021-05-16 PROCEDURE — U0002 COVID-19 LAB TEST NON-CDC: HCPCS | Performed by: EMERGENCY MEDICINE

## 2021-05-16 PROCEDURE — S0030 INJECTION, METRONIDAZOLE: HCPCS | Performed by: INTERNAL MEDICINE

## 2021-05-16 PROCEDURE — 63600175 PHARM REV CODE 636 W HCPCS: Performed by: INTERNAL MEDICINE

## 2021-05-16 PROCEDURE — 99204 OFFICE O/P NEW MOD 45 MIN: CPT | Mod: 57,,, | Performed by: STUDENT IN AN ORGANIZED HEALTH CARE EDUCATION/TRAINING PROGRAM

## 2021-05-16 PROCEDURE — 85610 PROTHROMBIN TIME: CPT | Performed by: EMERGENCY MEDICINE

## 2021-05-16 PROCEDURE — 27000080 OPTIME MED/SURG SUP & DEVICES GENERAL CLASSIFICATION: Performed by: STUDENT IN AN ORGANIZED HEALTH CARE EDUCATION/TRAINING PROGRAM

## 2021-05-16 PROCEDURE — 47562 LAPAROSCOPIC CHOLECYSTECTOMY: CPT | Mod: ,,, | Performed by: STUDENT IN AN ORGANIZED HEALTH CARE EDUCATION/TRAINING PROGRAM

## 2021-05-16 PROCEDURE — 99204 PR OFFICE/OUTPT VISIT, NEW, LEVL IV, 45-59 MIN: ICD-10-PCS | Mod: 57,,, | Performed by: STUDENT IN AN ORGANIZED HEALTH CARE EDUCATION/TRAINING PROGRAM

## 2021-05-16 PROCEDURE — 81003 URINALYSIS AUTO W/O SCOPE: CPT | Performed by: EMERGENCY MEDICINE

## 2021-05-16 PROCEDURE — 83690 ASSAY OF LIPASE: CPT | Performed by: EMERGENCY MEDICINE

## 2021-05-16 PROCEDURE — 27201423 OPTIME MED/SURG SUP & DEVICES STERILE SUPPLY: Performed by: STUDENT IN AN ORGANIZED HEALTH CARE EDUCATION/TRAINING PROGRAM

## 2021-05-16 PROCEDURE — 96374 THER/PROPH/DIAG INJ IV PUSH: CPT

## 2021-05-16 PROCEDURE — 63600175 PHARM REV CODE 636 W HCPCS: Performed by: EMERGENCY MEDICINE

## 2021-05-16 PROCEDURE — 93010 EKG 12-LEAD: ICD-10-PCS | Mod: ,,, | Performed by: INTERNAL MEDICINE

## 2021-05-16 PROCEDURE — 27000673 HC TUBING BLOOD Y: Performed by: ANESTHESIOLOGY

## 2021-05-16 PROCEDURE — 27202107 HC XP QUATRO SENSOR: Performed by: ANESTHESIOLOGY

## 2021-05-16 PROCEDURE — 80053 COMPREHEN METABOLIC PANEL: CPT | Performed by: EMERGENCY MEDICINE

## 2021-05-16 PROCEDURE — 36415 COLL VENOUS BLD VENIPUNCTURE: CPT | Performed by: EMERGENCY MEDICINE

## 2021-05-16 RX ORDER — SUCCINYLCHOLINE CHLORIDE 20 MG/ML
INJECTION INTRAMUSCULAR; INTRAVENOUS
Status: DISCONTINUED | OUTPATIENT
Start: 2021-05-16 | End: 2021-05-16

## 2021-05-16 RX ORDER — ONDANSETRON 2 MG/ML
4 INJECTION INTRAMUSCULAR; INTRAVENOUS DAILY PRN
Status: DISCONTINUED | OUTPATIENT
Start: 2021-05-16 | End: 2021-05-16 | Stop reason: HOSPADM

## 2021-05-16 RX ORDER — FENTANYL CITRATE 50 UG/ML
INJECTION, SOLUTION INTRAMUSCULAR; INTRAVENOUS
Status: DISCONTINUED | OUTPATIENT
Start: 2021-05-16 | End: 2021-05-16

## 2021-05-16 RX ORDER — HYDROCODONE BITARTRATE AND ACETAMINOPHEN 5; 325 MG/1; MG/1
1 TABLET ORAL EVERY 6 HOURS PRN
Status: DISCONTINUED | OUTPATIENT
Start: 2021-05-16 | End: 2021-05-18 | Stop reason: HOSPADM

## 2021-05-16 RX ORDER — IBUPROFEN 200 MG
16 TABLET ORAL
Status: DISCONTINUED | OUTPATIENT
Start: 2021-05-16 | End: 2021-05-18 | Stop reason: HOSPADM

## 2021-05-16 RX ORDER — MORPHINE SULFATE 4 MG/ML
4 INJECTION, SOLUTION INTRAMUSCULAR; INTRAVENOUS
Status: COMPLETED | OUTPATIENT
Start: 2021-05-16 | End: 2021-05-16

## 2021-05-16 RX ORDER — PROPOFOL 10 MG/ML
VIAL (ML) INTRAVENOUS
Status: DISCONTINUED | OUTPATIENT
Start: 2021-05-16 | End: 2021-05-16

## 2021-05-16 RX ORDER — METOPROLOL TARTRATE 25 MG/1
25 TABLET, FILM COATED ORAL ONCE
Status: COMPLETED | OUTPATIENT
Start: 2021-05-17 | End: 2021-05-17

## 2021-05-16 RX ORDER — IBUPROFEN 200 MG
24 TABLET ORAL
Status: DISCONTINUED | OUTPATIENT
Start: 2021-05-16 | End: 2021-05-18 | Stop reason: HOSPADM

## 2021-05-16 RX ORDER — LIDOCAINE HYDROCHLORIDE 20 MG/ML
JELLY TOPICAL
Status: DISCONTINUED | OUTPATIENT
Start: 2021-05-16 | End: 2021-05-16

## 2021-05-16 RX ORDER — GLUCAGON 1 MG
1 KIT INJECTION
Status: DISCONTINUED | OUTPATIENT
Start: 2021-05-16 | End: 2021-05-18 | Stop reason: HOSPADM

## 2021-05-16 RX ORDER — SODIUM CHLORIDE 0.9 % (FLUSH) 0.9 %
10 SYRINGE (ML) INJECTION
Status: DISCONTINUED | OUTPATIENT
Start: 2021-05-16 | End: 2021-05-16

## 2021-05-16 RX ORDER — FAMOTIDINE 10 MG/ML
INJECTION INTRAVENOUS
Status: DISCONTINUED | OUTPATIENT
Start: 2021-05-16 | End: 2021-05-16

## 2021-05-16 RX ORDER — MORPHINE SULFATE 4 MG/ML
4 INJECTION, SOLUTION INTRAMUSCULAR; INTRAVENOUS EVERY 4 HOURS PRN
Status: DISCONTINUED | OUTPATIENT
Start: 2021-05-16 | End: 2021-05-18

## 2021-05-16 RX ORDER — METOPROLOL TARTRATE 25 MG/1
25 TABLET, FILM COATED ORAL ONCE
Status: DISCONTINUED | OUTPATIENT
Start: 2021-05-17 | End: 2021-05-16

## 2021-05-16 RX ORDER — OXYCODONE HYDROCHLORIDE 5 MG/1
5 TABLET ORAL
Status: DISCONTINUED | OUTPATIENT
Start: 2021-05-16 | End: 2021-05-16 | Stop reason: HOSPADM

## 2021-05-16 RX ORDER — PROCHLORPERAZINE EDISYLATE 5 MG/ML
5 INJECTION INTRAMUSCULAR; INTRAVENOUS EVERY 6 HOURS PRN
Status: DISCONTINUED | OUTPATIENT
Start: 2021-05-16 | End: 2021-05-18 | Stop reason: HOSPADM

## 2021-05-16 RX ORDER — HYDROMORPHONE HYDROCHLORIDE 1 MG/ML
1 INJECTION, SOLUTION INTRAMUSCULAR; INTRAVENOUS; SUBCUTANEOUS
Status: ACTIVE | OUTPATIENT
Start: 2021-05-16 | End: 2021-05-16

## 2021-05-16 RX ORDER — ONDANSETRON 2 MG/ML
4 INJECTION INTRAMUSCULAR; INTRAVENOUS
Status: COMPLETED | OUTPATIENT
Start: 2021-05-16 | End: 2021-05-16

## 2021-05-16 RX ORDER — SODIUM CHLORIDE 0.9 % (FLUSH) 0.9 %
10 SYRINGE (ML) INJECTION
Status: DISCONTINUED | OUTPATIENT
Start: 2021-05-16 | End: 2021-05-18 | Stop reason: HOSPADM

## 2021-05-16 RX ORDER — MORPHINE SULFATE 10 MG/ML
2 INJECTION INTRAMUSCULAR; INTRAVENOUS; SUBCUTANEOUS
Status: DISCONTINUED | OUTPATIENT
Start: 2021-05-16 | End: 2021-05-16

## 2021-05-16 RX ORDER — LEVOFLOXACIN 5 MG/ML
750 INJECTION, SOLUTION INTRAVENOUS
Status: DISCONTINUED | OUTPATIENT
Start: 2021-05-16 | End: 2021-05-16

## 2021-05-16 RX ORDER — ROCURONIUM BROMIDE 10 MG/ML
INJECTION, SOLUTION INTRAVENOUS
Status: DISCONTINUED | OUTPATIENT
Start: 2021-05-16 | End: 2021-05-16

## 2021-05-16 RX ORDER — MORPHINE SULFATE 2 MG/ML
2 INJECTION, SOLUTION INTRAMUSCULAR; INTRAVENOUS EVERY 4 HOURS PRN
Status: DISCONTINUED | OUTPATIENT
Start: 2021-05-16 | End: 2021-05-18

## 2021-05-16 RX ORDER — METHOCARBAMOL 750 MG/1
750 TABLET, FILM COATED ORAL 3 TIMES DAILY
COMMUNITY
Start: 2021-04-28 | End: 2022-05-14

## 2021-05-16 RX ORDER — SODIUM CHLORIDE, SODIUM LACTATE, POTASSIUM CHLORIDE, CALCIUM CHLORIDE 600; 310; 30; 20 MG/100ML; MG/100ML; MG/100ML; MG/100ML
INJECTION, SOLUTION INTRAVENOUS CONTINUOUS PRN
Status: DISCONTINUED | OUTPATIENT
Start: 2021-05-16 | End: 2021-05-16

## 2021-05-16 RX ORDER — DEXAMETHASONE SODIUM PHOSPHATE 4 MG/ML
INJECTION, SOLUTION INTRA-ARTICULAR; INTRALESIONAL; INTRAMUSCULAR; INTRAVENOUS; SOFT TISSUE
Status: DISCONTINUED | OUTPATIENT
Start: 2021-05-16 | End: 2021-05-16

## 2021-05-16 RX ORDER — FENTANYL CITRATE 50 UG/ML
25 INJECTION, SOLUTION INTRAMUSCULAR; INTRAVENOUS
Status: DISCONTINUED | OUTPATIENT
Start: 2021-05-16 | End: 2021-05-16 | Stop reason: HOSPADM

## 2021-05-16 RX ORDER — LIDOCAINE HCL/PF 100 MG/5ML
SYRINGE (ML) INTRAVENOUS
Status: DISCONTINUED | OUTPATIENT
Start: 2021-05-16 | End: 2021-05-16

## 2021-05-16 RX ORDER — PROCHLORPERAZINE EDISYLATE 5 MG/ML
5 INJECTION INTRAMUSCULAR; INTRAVENOUS EVERY 30 MIN PRN
Status: DISCONTINUED | OUTPATIENT
Start: 2021-05-16 | End: 2021-05-16 | Stop reason: HOSPADM

## 2021-05-16 RX ORDER — ONDANSETRON 2 MG/ML
4 INJECTION INTRAMUSCULAR; INTRAVENOUS EVERY 8 HOURS PRN
Status: DISCONTINUED | OUTPATIENT
Start: 2021-05-16 | End: 2021-05-18 | Stop reason: HOSPADM

## 2021-05-16 RX ORDER — TALC
6 POWDER (GRAM) TOPICAL NIGHTLY PRN
Status: DISCONTINUED | OUTPATIENT
Start: 2021-05-16 | End: 2021-05-18 | Stop reason: HOSPADM

## 2021-05-16 RX ORDER — SODIUM CHLORIDE 9 MG/ML
INJECTION, SOLUTION INTRAVENOUS
Status: COMPLETED | OUTPATIENT
Start: 2021-05-16 | End: 2021-05-16

## 2021-05-16 RX ORDER — ONDANSETRON 2 MG/ML
INJECTION INTRAMUSCULAR; INTRAVENOUS
Status: DISCONTINUED | OUTPATIENT
Start: 2021-05-16 | End: 2021-05-16

## 2021-05-16 RX ORDER — METRONIDAZOLE 500 MG/100ML
500 INJECTION, SOLUTION INTRAVENOUS
Status: DISCONTINUED | OUTPATIENT
Start: 2021-05-16 | End: 2021-05-16

## 2021-05-16 RX ADMIN — METRONIDAZOLE 500 MG: 500 INJECTION, SOLUTION INTRAVENOUS at 10:05

## 2021-05-16 RX ADMIN — FENTANYL CITRATE 25 MCG: 50 INJECTION, SOLUTION INTRAMUSCULAR; INTRAVENOUS at 03:05

## 2021-05-16 RX ADMIN — ONDANSETRON 4 MG: 2 INJECTION INTRAMUSCULAR; INTRAVENOUS at 12:05

## 2021-05-16 RX ADMIN — SUCCINYLCHOLINE CHLORIDE 140 MG: 20 INJECTION, SOLUTION INTRAMUSCULAR; INTRAVENOUS at 01:05

## 2021-05-16 RX ADMIN — MORPHINE SULFATE 4 MG: 4 INJECTION INTRAVENOUS at 07:05

## 2021-05-16 RX ADMIN — SUGAMMADEX 200 MG: 100 INJECTION, SOLUTION INTRAVENOUS at 02:05

## 2021-05-16 RX ADMIN — FENTANYL CITRATE 50 MCG: 50 INJECTION INTRAMUSCULAR; INTRAVENOUS at 01:05

## 2021-05-16 RX ADMIN — ONDANSETRON 4 MG: 2 INJECTION INTRAMUSCULAR; INTRAVENOUS at 02:05

## 2021-05-16 RX ADMIN — DEXAMETHASONE SODIUM PHOSPHATE 8 MG: 4 INJECTION, SOLUTION INTRAMUSCULAR; INTRAVENOUS at 01:05

## 2021-05-16 RX ADMIN — ONDANSETRON 4 MG: 2 INJECTION INTRAMUSCULAR; INTRAVENOUS at 07:05

## 2021-05-16 RX ADMIN — MORPHINE SULFATE 2 MG: 2 INJECTION, SOLUTION INTRAMUSCULAR; INTRAVENOUS at 06:05

## 2021-05-16 RX ADMIN — LIDOCAINE HYDROCHLORIDE 75 MG: 20 INJECTION, SOLUTION INTRAVENOUS at 01:05

## 2021-05-16 RX ADMIN — SODIUM CHLORIDE, SODIUM LACTATE, POTASSIUM CHLORIDE, AND CALCIUM CHLORIDE: .6; .31; .03; .02 INJECTION, SOLUTION INTRAVENOUS at 02:05

## 2021-05-16 RX ADMIN — HYDROCODONE BITARTRATE AND ACETAMINOPHEN 1 TABLET: 5; 325 TABLET ORAL at 10:05

## 2021-05-16 RX ADMIN — LIDOCAINE HYDROCHLORIDE 4 ML: 20 JELLY TOPICAL at 01:05

## 2021-05-16 RX ADMIN — PROPOFOL 140 MG: 10 INJECTION, EMULSION INTRAVENOUS at 01:05

## 2021-05-16 RX ADMIN — SODIUM CHLORIDE, SODIUM LACTATE, POTASSIUM CHLORIDE, AND CALCIUM CHLORIDE: .6; .31; .03; .02 INJECTION, SOLUTION INTRAVENOUS at 01:05

## 2021-05-16 RX ADMIN — SODIUM CHLORIDE: 0.9 INJECTION, SOLUTION INTRAVENOUS at 07:05

## 2021-05-16 RX ADMIN — FAMOTIDINE 20 MG: 10 INJECTION, SOLUTION INTRAVENOUS at 01:05

## 2021-05-16 RX ADMIN — ROCURONIUM BROMIDE 30 MG: 10 INJECTION, SOLUTION INTRAVENOUS at 01:05

## 2021-05-17 LAB
ALBUMIN SERPL BCP-MCNC: 3.6 G/DL (ref 3.5–5.2)
ALP SERPL-CCNC: 97 U/L (ref 55–135)
ALT SERPL W/O P-5'-P-CCNC: 147 U/L (ref 10–44)
ANION GAP SERPL CALC-SCNC: 7 MMOL/L (ref 8–16)
ANION GAP SERPL CALC-SCNC: 8 MMOL/L (ref 8–16)
AST SERPL-CCNC: 137 U/L (ref 10–40)
BASOPHILS # BLD AUTO: 0.01 K/UL (ref 0–0.2)
BASOPHILS NFR BLD: 0.1 % (ref 0–1.9)
BILIRUB SERPL-MCNC: 1 MG/DL (ref 0.1–1)
BUN SERPL-MCNC: 11 MG/DL (ref 8–23)
BUN SERPL-MCNC: 14 MG/DL (ref 8–23)
CALCIUM SERPL-MCNC: 8.6 MG/DL (ref 8.7–10.5)
CALCIUM SERPL-MCNC: 8.8 MG/DL (ref 8.7–10.5)
CHLORIDE SERPL-SCNC: 100 MMOL/L (ref 95–110)
CHLORIDE SERPL-SCNC: 99 MMOL/L (ref 95–110)
CO2 SERPL-SCNC: 25 MMOL/L (ref 23–29)
CO2 SERPL-SCNC: 28 MMOL/L (ref 23–29)
CREAT SERPL-MCNC: 1.1 MG/DL (ref 0.5–1.4)
CREAT SERPL-MCNC: 1.1 MG/DL (ref 0.5–1.4)
DIFFERENTIAL METHOD: ABNORMAL
EOSINOPHIL # BLD AUTO: 0 K/UL (ref 0–0.5)
EOSINOPHIL NFR BLD: 0 % (ref 0–8)
ERYTHROCYTE [DISTWIDTH] IN BLOOD BY AUTOMATED COUNT: 14.7 % (ref 11.5–14.5)
EST. GFR  (AFRICAN AMERICAN): >60 ML/MIN/1.73 M^2
EST. GFR  (AFRICAN AMERICAN): >60 ML/MIN/1.73 M^2
EST. GFR  (NON AFRICAN AMERICAN): 53.9 ML/MIN/1.73 M^2
EST. GFR  (NON AFRICAN AMERICAN): 53.9 ML/MIN/1.73 M^2
GLUCOSE SERPL-MCNC: 127 MG/DL (ref 70–110)
GLUCOSE SERPL-MCNC: 132 MG/DL (ref 70–110)
HCT VFR BLD AUTO: 37 % (ref 37–48.5)
HGB BLD-MCNC: 12.2 G/DL (ref 12–16)
IMM GRANULOCYTES # BLD AUTO: 0.05 K/UL (ref 0–0.04)
IMM GRANULOCYTES NFR BLD AUTO: 0.6 % (ref 0–0.5)
LIPASE SERPL-CCNC: 29 U/L (ref 4–60)
LYMPHOCYTES # BLD AUTO: 1.1 K/UL (ref 1–4.8)
LYMPHOCYTES NFR BLD: 12.5 % (ref 18–48)
MAGNESIUM SERPL-MCNC: 1.6 MG/DL (ref 1.6–2.6)
MCH RBC QN AUTO: 29.6 PG (ref 27–31)
MCHC RBC AUTO-ENTMCNC: 33 G/DL (ref 32–36)
MCV RBC AUTO: 90 FL (ref 82–98)
MONOCYTES # BLD AUTO: 0.5 K/UL (ref 0.3–1)
MONOCYTES NFR BLD: 6.3 % (ref 4–15)
NEUTROPHILS # BLD AUTO: 6.9 K/UL (ref 1.8–7.7)
NEUTROPHILS NFR BLD: 80.5 % (ref 38–73)
NRBC BLD-RTO: 0 /100 WBC
PLATELET # BLD AUTO: 229 K/UL (ref 150–450)
PMV BLD AUTO: 10.8 FL (ref 9.2–12.9)
POTASSIUM SERPL-SCNC: 4.4 MMOL/L (ref 3.5–5.1)
POTASSIUM SERPL-SCNC: 4.7 MMOL/L (ref 3.5–5.1)
PROT SERPL-MCNC: 6.6 G/DL (ref 6–8.4)
RBC # BLD AUTO: 4.12 M/UL (ref 4–5.4)
SODIUM SERPL-SCNC: 133 MMOL/L (ref 136–145)
SODIUM SERPL-SCNC: 134 MMOL/L (ref 136–145)
WBC # BLD AUTO: 8.62 K/UL (ref 3.9–12.7)

## 2021-05-17 PROCEDURE — 80053 COMPREHEN METABOLIC PANEL: CPT | Performed by: STUDENT IN AN ORGANIZED HEALTH CARE EDUCATION/TRAINING PROGRAM

## 2021-05-17 PROCEDURE — 96376 TX/PRO/DX INJ SAME DRUG ADON: CPT

## 2021-05-17 PROCEDURE — 25000003 PHARM REV CODE 250: Performed by: INTERNAL MEDICINE

## 2021-05-17 PROCEDURE — 63600175 PHARM REV CODE 636 W HCPCS: Performed by: INTERNAL MEDICINE

## 2021-05-17 PROCEDURE — 27000221 HC OXYGEN, UP TO 24 HOURS

## 2021-05-17 PROCEDURE — G0378 HOSPITAL OBSERVATION PER HR: HCPCS

## 2021-05-17 PROCEDURE — 80048 BASIC METABOLIC PNL TOTAL CA: CPT | Mod: XB | Performed by: INTERNAL MEDICINE

## 2021-05-17 PROCEDURE — 83690 ASSAY OF LIPASE: CPT | Performed by: STUDENT IN AN ORGANIZED HEALTH CARE EDUCATION/TRAINING PROGRAM

## 2021-05-17 PROCEDURE — 99900035 HC TECH TIME PER 15 MIN (STAT)

## 2021-05-17 PROCEDURE — 83735 ASSAY OF MAGNESIUM: CPT | Performed by: INTERNAL MEDICINE

## 2021-05-17 PROCEDURE — 36415 COLL VENOUS BLD VENIPUNCTURE: CPT | Performed by: STUDENT IN AN ORGANIZED HEALTH CARE EDUCATION/TRAINING PROGRAM

## 2021-05-17 PROCEDURE — 36415 COLL VENOUS BLD VENIPUNCTURE: CPT | Performed by: INTERNAL MEDICINE

## 2021-05-17 PROCEDURE — 85025 COMPLETE CBC W/AUTO DIFF WBC: CPT | Performed by: INTERNAL MEDICINE

## 2021-05-17 PROCEDURE — 99900031 HC PATIENT EDUCATION (STAT)

## 2021-05-17 RX ORDER — GABAPENTIN 300 MG/1
300 CAPSULE ORAL NIGHTLY
Status: DISCONTINUED | OUTPATIENT
Start: 2021-05-17 | End: 2021-05-18 | Stop reason: HOSPADM

## 2021-05-17 RX ORDER — PANTOPRAZOLE SODIUM 40 MG/1
40 TABLET, DELAYED RELEASE ORAL
Status: DISCONTINUED | OUTPATIENT
Start: 2021-05-17 | End: 2021-05-18 | Stop reason: HOSPADM

## 2021-05-17 RX ORDER — ALPRAZOLAM 0.5 MG/1
0.5 TABLET ORAL 2 TIMES DAILY PRN
Status: DISCONTINUED | OUTPATIENT
Start: 2021-05-17 | End: 2021-05-18 | Stop reason: HOSPADM

## 2021-05-17 RX ORDER — ACETAMINOPHEN 325 MG/1
650 TABLET ORAL EVERY 6 HOURS PRN
Status: DISCONTINUED | OUTPATIENT
Start: 2021-05-17 | End: 2021-05-18 | Stop reason: HOSPADM

## 2021-05-17 RX ORDER — METOPROLOL TARTRATE 25 MG/1
25 TABLET, FILM COATED ORAL 2 TIMES DAILY
Status: DISCONTINUED | OUTPATIENT
Start: 2021-05-17 | End: 2021-05-18 | Stop reason: HOSPADM

## 2021-05-17 RX ADMIN — HYDROCODONE BITARTRATE AND ACETAMINOPHEN 1 TABLET: 5; 325 TABLET ORAL at 09:05

## 2021-05-17 RX ADMIN — PANTOPRAZOLE SODIUM 40 MG: 40 TABLET, DELAYED RELEASE ORAL at 06:05

## 2021-05-17 RX ADMIN — GABAPENTIN 300 MG: 300 CAPSULE ORAL at 12:05

## 2021-05-17 RX ADMIN — GABAPENTIN 300 MG: 300 CAPSULE ORAL at 09:05

## 2021-05-17 RX ADMIN — MORPHINE SULFATE 4 MG: 4 INJECTION INTRAVENOUS at 12:05

## 2021-05-17 RX ADMIN — METOPROLOL TARTRATE 25 MG: 25 TABLET, FILM COATED ORAL at 09:05

## 2021-05-17 RX ADMIN — METOPROLOL TARTRATE 25 MG: 25 TABLET, FILM COATED ORAL at 12:05

## 2021-05-18 VITALS
HEART RATE: 57 BPM | SYSTOLIC BLOOD PRESSURE: 122 MMHG | OXYGEN SATURATION: 96 % | TEMPERATURE: 99 F | RESPIRATION RATE: 18 BRPM | DIASTOLIC BLOOD PRESSURE: 72 MMHG | HEIGHT: 63 IN | BODY MASS INDEX: 34.38 KG/M2 | WEIGHT: 194 LBS

## 2021-05-18 PROBLEM — K81.0 ACUTE CHOLECYSTITIS: Status: RESOLVED | Noted: 2021-05-16 | Resolved: 2021-05-18

## 2021-05-18 PROBLEM — K80.00 CALCULUS OF GALLBLADDER WITH ACUTE CHOLECYSTITIS WITHOUT OBSTRUCTION: Status: RESOLVED | Noted: 2021-05-16 | Resolved: 2021-05-18

## 2021-05-18 LAB
ALBUMIN SERPL BCP-MCNC: 3.5 G/DL (ref 3.5–5.2)
ALP SERPL-CCNC: 86 U/L (ref 55–135)
ALT SERPL W/O P-5'-P-CCNC: 115 U/L (ref 10–44)
ANION GAP SERPL CALC-SCNC: 8 MMOL/L (ref 8–16)
AST SERPL-CCNC: 76 U/L (ref 10–40)
BASOPHILS # BLD AUTO: 0.04 K/UL (ref 0–0.2)
BASOPHILS NFR BLD: 0.5 % (ref 0–1.9)
BILIRUB SERPL-MCNC: 1.1 MG/DL (ref 0.1–1)
BUN SERPL-MCNC: 21 MG/DL (ref 8–23)
CALCIUM SERPL-MCNC: 8.6 MG/DL (ref 8.7–10.5)
CHLORIDE SERPL-SCNC: 101 MMOL/L (ref 95–110)
CO2 SERPL-SCNC: 28 MMOL/L (ref 23–29)
CREAT SERPL-MCNC: 1.1 MG/DL (ref 0.5–1.4)
DIFFERENTIAL METHOD: ABNORMAL
EOSINOPHIL # BLD AUTO: 0.2 K/UL (ref 0–0.5)
EOSINOPHIL NFR BLD: 1.8 % (ref 0–8)
ERYTHROCYTE [DISTWIDTH] IN BLOOD BY AUTOMATED COUNT: 15 % (ref 11.5–14.5)
EST. GFR  (AFRICAN AMERICAN): >60 ML/MIN/1.73 M^2
EST. GFR  (NON AFRICAN AMERICAN): 53.9 ML/MIN/1.73 M^2
GLUCOSE SERPL-MCNC: 98 MG/DL (ref 70–110)
HCT VFR BLD AUTO: 35.4 % (ref 37–48.5)
HGB BLD-MCNC: 11.3 G/DL (ref 12–16)
IMM GRANULOCYTES # BLD AUTO: 0.02 K/UL (ref 0–0.04)
IMM GRANULOCYTES NFR BLD AUTO: 0.2 % (ref 0–0.5)
LIPASE SERPL-CCNC: 36 U/L (ref 4–60)
LYMPHOCYTES # BLD AUTO: 3.9 K/UL (ref 1–4.8)
LYMPHOCYTES NFR BLD: 44.2 % (ref 18–48)
MAGNESIUM SERPL-MCNC: 1.7 MG/DL (ref 1.6–2.6)
MCH RBC QN AUTO: 29.2 PG (ref 27–31)
MCHC RBC AUTO-ENTMCNC: 31.9 G/DL (ref 32–36)
MCV RBC AUTO: 92 FL (ref 82–98)
MONOCYTES # BLD AUTO: 0.6 K/UL (ref 0.3–1)
MONOCYTES NFR BLD: 6.4 % (ref 4–15)
NEUTROPHILS # BLD AUTO: 4.1 K/UL (ref 1.8–7.7)
NEUTROPHILS NFR BLD: 46.9 % (ref 38–73)
NRBC BLD-RTO: 0 /100 WBC
PLATELET # BLD AUTO: 204 K/UL (ref 150–450)
PMV BLD AUTO: 10.3 FL (ref 9.2–12.9)
POTASSIUM SERPL-SCNC: 4 MMOL/L (ref 3.5–5.1)
PROT SERPL-MCNC: 6.5 G/DL (ref 6–8.4)
RBC # BLD AUTO: 3.87 M/UL (ref 4–5.4)
SODIUM SERPL-SCNC: 137 MMOL/L (ref 136–145)
WBC # BLD AUTO: 8.78 K/UL (ref 3.9–12.7)

## 2021-05-18 PROCEDURE — 25000003 PHARM REV CODE 250: Performed by: INTERNAL MEDICINE

## 2021-05-18 PROCEDURE — 85025 COMPLETE CBC W/AUTO DIFF WBC: CPT | Performed by: INTERNAL MEDICINE

## 2021-05-18 PROCEDURE — 27000221 HC OXYGEN, UP TO 24 HOURS

## 2021-05-18 PROCEDURE — G0378 HOSPITAL OBSERVATION PER HR: HCPCS

## 2021-05-18 PROCEDURE — 80053 COMPREHEN METABOLIC PANEL: CPT | Performed by: STUDENT IN AN ORGANIZED HEALTH CARE EDUCATION/TRAINING PROGRAM

## 2021-05-18 PROCEDURE — 83690 ASSAY OF LIPASE: CPT | Performed by: STUDENT IN AN ORGANIZED HEALTH CARE EDUCATION/TRAINING PROGRAM

## 2021-05-18 PROCEDURE — 99900035 HC TECH TIME PER 15 MIN (STAT)

## 2021-05-18 PROCEDURE — 83735 ASSAY OF MAGNESIUM: CPT | Performed by: INTERNAL MEDICINE

## 2021-05-18 PROCEDURE — 36415 COLL VENOUS BLD VENIPUNCTURE: CPT | Performed by: INTERNAL MEDICINE

## 2021-05-18 RX ORDER — ONDANSETRON 4 MG/1
4 TABLET, FILM COATED ORAL EVERY 8 HOURS PRN
Qty: 30 TABLET | Refills: 1 | Status: SHIPPED | OUTPATIENT
Start: 2021-05-18 | End: 2021-10-19 | Stop reason: SDUPTHER

## 2021-05-18 RX ORDER — DOCUSATE SODIUM 100 MG/1
100 CAPSULE, LIQUID FILLED ORAL 2 TIMES DAILY PRN
Qty: 60 CAPSULE | Refills: 1 | Status: SHIPPED | OUTPATIENT
Start: 2021-05-18 | End: 2022-09-28

## 2021-05-18 RX ORDER — HYDROCODONE BITARTRATE AND ACETAMINOPHEN 5; 325 MG/1; MG/1
1 TABLET ORAL EVERY 6 HOURS PRN
Qty: 28 TABLET | Refills: 0 | Status: SHIPPED | OUTPATIENT
Start: 2021-05-18 | End: 2021-06-03

## 2021-05-18 RX ADMIN — METOPROLOL TARTRATE 25 MG: 25 TABLET, FILM COATED ORAL at 08:05

## 2021-05-18 RX ADMIN — HYDROCODONE BITARTRATE AND ACETAMINOPHEN 1 TABLET: 5; 325 TABLET ORAL at 08:05

## 2021-05-18 RX ADMIN — PANTOPRAZOLE SODIUM 40 MG: 40 TABLET, DELAYED RELEASE ORAL at 06:05

## 2021-05-20 ENCOUNTER — TELEPHONE (OUTPATIENT)
Dept: SURGERY | Facility: CLINIC | Age: 61
End: 2021-05-20

## 2021-05-28 ENCOUNTER — OFFICE VISIT (OUTPATIENT)
Dept: FAMILY MEDICINE | Facility: CLINIC | Age: 61
End: 2021-05-28
Payer: COMMERCIAL

## 2021-05-28 VITALS
DIASTOLIC BLOOD PRESSURE: 69 MMHG | HEIGHT: 63 IN | WEIGHT: 192 LBS | TEMPERATURE: 98 F | OXYGEN SATURATION: 98 % | HEART RATE: 68 BPM | SYSTOLIC BLOOD PRESSURE: 139 MMHG | BODY MASS INDEX: 34.02 KG/M2

## 2021-05-28 DIAGNOSIS — I10 HYPERTENSION, ESSENTIAL: Primary | ICD-10-CM

## 2021-05-28 DIAGNOSIS — M72.2 PLANTAR FASCIITIS: ICD-10-CM

## 2021-05-28 DIAGNOSIS — F41.9 ANXIETY: ICD-10-CM

## 2021-05-28 DIAGNOSIS — R30.0 DYSURIA: ICD-10-CM

## 2021-05-28 DIAGNOSIS — M79.672 LEFT FOOT PAIN: ICD-10-CM

## 2021-05-28 LAB
BILIRUB SERPL-MCNC: NORMAL MG/DL
BLOOD URINE, POC: NORMAL
COLOR, POC UA: YELLOW
GLUCOSE UR QL STRIP: NORMAL
KETONES UR QL STRIP: NORMAL
LEUKOCYTE ESTERASE URINE, POC: NORMAL
NITRITE, POC UA: NORMAL
PH, POC UA: 5
PROTEIN, POC: NORMAL
SPECIFIC GRAVITY, POC UA: 1.02
UROBILINOGEN, POC UA: NORMAL

## 2021-05-28 PROCEDURE — 81003 URINALYSIS AUTO W/O SCOPE: CPT | Mod: QW,S$GLB,, | Performed by: FAMILY MEDICINE

## 2021-05-28 PROCEDURE — 99214 OFFICE O/P EST MOD 30 MIN: CPT | Mod: 25,S$GLB,, | Performed by: FAMILY MEDICINE

## 2021-05-28 PROCEDURE — 1126F PR PAIN SEVERITY QUANTIFIED, NO PAIN PRESENT: ICD-10-PCS | Mod: S$GLB,,, | Performed by: FAMILY MEDICINE

## 2021-05-28 PROCEDURE — 3075F SYST BP GE 130 - 139MM HG: CPT | Mod: CPTII,S$GLB,, | Performed by: FAMILY MEDICINE

## 2021-05-28 PROCEDURE — 3008F PR BODY MASS INDEX (BMI) DOCUMENTED: ICD-10-PCS | Mod: CPTII,S$GLB,, | Performed by: FAMILY MEDICINE

## 2021-05-28 PROCEDURE — 99214 PR OFFICE/OUTPT VISIT, EST, LEVL IV, 30-39 MIN: ICD-10-PCS | Mod: 25,S$GLB,, | Performed by: FAMILY MEDICINE

## 2021-05-28 PROCEDURE — 3078F PR MOST RECENT DIASTOLIC BLOOD PRESSURE < 80 MM HG: ICD-10-PCS | Mod: CPTII,S$GLB,, | Performed by: FAMILY MEDICINE

## 2021-05-28 PROCEDURE — 3075F PR MOST RECENT SYSTOLIC BLOOD PRESS GE 130-139MM HG: ICD-10-PCS | Mod: CPTII,S$GLB,, | Performed by: FAMILY MEDICINE

## 2021-05-28 PROCEDURE — 1126F AMNT PAIN NOTED NONE PRSNT: CPT | Mod: S$GLB,,, | Performed by: FAMILY MEDICINE

## 2021-05-28 PROCEDURE — 3008F BODY MASS INDEX DOCD: CPT | Mod: CPTII,S$GLB,, | Performed by: FAMILY MEDICINE

## 2021-05-28 PROCEDURE — 3078F DIAST BP <80 MM HG: CPT | Mod: CPTII,S$GLB,, | Performed by: FAMILY MEDICINE

## 2021-05-28 PROCEDURE — 81003 POCT URINALYSIS W/O SCOPE: ICD-10-PCS | Mod: QW,S$GLB,, | Performed by: FAMILY MEDICINE

## 2021-05-28 RX ORDER — METRONIDAZOLE 250 MG/1
250 TABLET ORAL 3 TIMES DAILY
COMMUNITY
Start: 2021-01-26 | End: 2021-10-24

## 2021-05-28 RX ORDER — IBUPROFEN 800 MG/1
800 TABLET ORAL 3 TIMES DAILY PRN
Qty: 30 TABLET | Refills: 0 | Status: SHIPPED | OUTPATIENT
Start: 2021-05-28 | End: 2021-10-19 | Stop reason: SDUPTHER

## 2021-05-28 RX ORDER — PANTOPRAZOLE SODIUM 40 MG/1
40 TABLET, DELAYED RELEASE ORAL DAILY
Qty: 90 TABLET | Refills: 1 | Status: SHIPPED | OUTPATIENT
Start: 2021-05-28 | End: 2021-10-19 | Stop reason: SDUPTHER

## 2021-05-28 RX ORDER — ALPRAZOLAM 0.5 MG/1
0.5 TABLET ORAL 2 TIMES DAILY PRN
Qty: 30 TABLET | Refills: 0 | Status: SHIPPED | OUTPATIENT
Start: 2021-05-28 | End: 2021-10-19 | Stop reason: SDUPTHER

## 2021-05-28 RX ORDER — LEVOFLOXACIN 500 MG/1
500 TABLET, FILM COATED ORAL DAILY
COMMUNITY
Start: 2021-01-26 | End: 2021-10-24

## 2021-05-28 RX ORDER — FLUCONAZOLE 100 MG/1
100 TABLET ORAL DAILY
Qty: 10 TABLET | Refills: 0 | Status: SHIPPED | OUTPATIENT
Start: 2021-05-28 | End: 2021-06-27

## 2021-05-28 RX ORDER — IBUPROFEN 800 MG/1
800 TABLET ORAL EVERY 6 HOURS PRN
Qty: 30 TABLET | Refills: 2 | Status: CANCELLED | OUTPATIENT
Start: 2021-05-28

## 2021-05-30 LAB — BACTERIA UR CULT: NORMAL

## 2021-06-03 ENCOUNTER — OFFICE VISIT (OUTPATIENT)
Dept: SURGERY | Facility: CLINIC | Age: 61
End: 2021-06-03
Payer: COMMERCIAL

## 2021-06-03 VITALS — WEIGHT: 192 LBS | HEIGHT: 63 IN | BODY MASS INDEX: 34.02 KG/M2

## 2021-06-03 DIAGNOSIS — Z98.890 POST-OPERATIVE STATE: Primary | ICD-10-CM

## 2021-06-03 PROCEDURE — 1126F PR PAIN SEVERITY QUANTIFIED, NO PAIN PRESENT: ICD-10-PCS | Mod: S$GLB,,, | Performed by: STUDENT IN AN ORGANIZED HEALTH CARE EDUCATION/TRAINING PROGRAM

## 2021-06-03 PROCEDURE — 99024 POSTOP FOLLOW-UP VISIT: CPT | Mod: S$GLB,,, | Performed by: STUDENT IN AN ORGANIZED HEALTH CARE EDUCATION/TRAINING PROGRAM

## 2021-06-03 PROCEDURE — 1126F AMNT PAIN NOTED NONE PRSNT: CPT | Mod: S$GLB,,, | Performed by: STUDENT IN AN ORGANIZED HEALTH CARE EDUCATION/TRAINING PROGRAM

## 2021-06-03 PROCEDURE — 99024 PR POST-OP FOLLOW-UP VISIT: ICD-10-PCS | Mod: S$GLB,,, | Performed by: STUDENT IN AN ORGANIZED HEALTH CARE EDUCATION/TRAINING PROGRAM

## 2021-06-03 PROCEDURE — 3008F PR BODY MASS INDEX (BMI) DOCUMENTED: ICD-10-PCS | Mod: CPTII,S$GLB,, | Performed by: STUDENT IN AN ORGANIZED HEALTH CARE EDUCATION/TRAINING PROGRAM

## 2021-06-03 PROCEDURE — 3008F BODY MASS INDEX DOCD: CPT | Mod: CPTII,S$GLB,, | Performed by: STUDENT IN AN ORGANIZED HEALTH CARE EDUCATION/TRAINING PROGRAM

## 2021-08-09 ENCOUNTER — DOCUMENTATION ONLY (OUTPATIENT)
Dept: REHABILITATION | Facility: HOSPITAL | Age: 61
End: 2021-08-09

## 2021-08-09 PROBLEM — R29.898 IMPAIRED FLEXIBILITY OF LOWER EXTREMITY: Status: RESOLVED | Noted: 2020-06-10 | Resolved: 2021-08-09

## 2021-08-09 PROBLEM — R52 TENDERNESS: Status: RESOLVED | Noted: 2020-06-10 | Resolved: 2021-08-09

## 2021-08-09 PROBLEM — R29.898 DECREASED STRENGTH OF LOWER EXTREMITY: Status: RESOLVED | Noted: 2020-06-10 | Resolved: 2021-08-09

## 2021-08-09 PROBLEM — M72.2 PLANTAR FASCIITIS, LEFT: Status: RESOLVED | Noted: 2020-06-10 | Resolved: 2021-08-09

## 2021-10-19 ENCOUNTER — LAB VISIT (OUTPATIENT)
Dept: LAB | Facility: HOSPITAL | Age: 61
End: 2021-10-19
Attending: FAMILY MEDICINE
Payer: COMMERCIAL

## 2021-10-19 ENCOUNTER — OFFICE VISIT (OUTPATIENT)
Dept: FAMILY MEDICINE | Facility: CLINIC | Age: 61
End: 2021-10-19
Payer: COMMERCIAL

## 2021-10-19 VITALS
DIASTOLIC BLOOD PRESSURE: 78 MMHG | BODY MASS INDEX: 33.13 KG/M2 | WEIGHT: 187 LBS | HEIGHT: 63 IN | SYSTOLIC BLOOD PRESSURE: 126 MMHG | OXYGEN SATURATION: 99 % | TEMPERATURE: 97 F | HEART RATE: 79 BPM

## 2021-10-19 DIAGNOSIS — R19.7 DIARRHEA, UNSPECIFIED TYPE: ICD-10-CM

## 2021-10-19 DIAGNOSIS — I10 HYPERTENSION, ESSENTIAL: Primary | ICD-10-CM

## 2021-10-19 DIAGNOSIS — I10 HYPERTENSION, ESSENTIAL: ICD-10-CM

## 2021-10-19 DIAGNOSIS — Z78.0 MENOPAUSE: ICD-10-CM

## 2021-10-19 DIAGNOSIS — G43.909 MIGRAINE WITHOUT STATUS MIGRAINOSUS, NOT INTRACTABLE, UNSPECIFIED MIGRAINE TYPE: ICD-10-CM

## 2021-10-19 DIAGNOSIS — K21.9 GASTROESOPHAGEAL REFLUX DISEASE, UNSPECIFIED WHETHER ESOPHAGITIS PRESENT: ICD-10-CM

## 2021-10-19 DIAGNOSIS — L40.50 PSORIATIC ARTHRITIS: ICD-10-CM

## 2021-10-19 LAB
ALBUMIN SERPL BCP-MCNC: 4.2 G/DL (ref 3.5–5.2)
ALP SERPL-CCNC: 90 U/L (ref 55–135)
ALT SERPL W/O P-5'-P-CCNC: 19 U/L (ref 10–44)
ANION GAP SERPL CALC-SCNC: 10 MMOL/L (ref 8–16)
AST SERPL-CCNC: 20 U/L (ref 10–40)
BASOPHILS # BLD AUTO: 0.03 K/UL (ref 0–0.2)
BASOPHILS NFR BLD: 0.7 % (ref 0–1.9)
BILIRUB SERPL-MCNC: 0.9 MG/DL (ref 0.1–1)
BUN SERPL-MCNC: 13 MG/DL (ref 8–23)
CALCIUM SERPL-MCNC: 9.3 MG/DL (ref 8.7–10.5)
CHLORIDE SERPL-SCNC: 106 MMOL/L (ref 95–110)
CO2 SERPL-SCNC: 27 MMOL/L (ref 23–29)
CREAT SERPL-MCNC: 0.9 MG/DL (ref 0.5–1.4)
DIFFERENTIAL METHOD: ABNORMAL
EOSINOPHIL # BLD AUTO: 0.1 K/UL (ref 0–0.5)
EOSINOPHIL NFR BLD: 1.4 % (ref 0–8)
ERYTHROCYTE [DISTWIDTH] IN BLOOD BY AUTOMATED COUNT: 14.2 % (ref 11.5–14.5)
EST. GFR  (AFRICAN AMERICAN): >60 ML/MIN/1.73 M^2
EST. GFR  (NON AFRICAN AMERICAN): >60 ML/MIN/1.73 M^2
GLUCOSE SERPL-MCNC: 95 MG/DL (ref 70–110)
HCT VFR BLD AUTO: 41.1 % (ref 37–48.5)
HGB BLD-MCNC: 13 G/DL (ref 12–16)
IMM GRANULOCYTES # BLD AUTO: 0.01 K/UL (ref 0–0.04)
IMM GRANULOCYTES NFR BLD AUTO: 0.2 % (ref 0–0.5)
LYMPHOCYTES # BLD AUTO: 2.3 K/UL (ref 1–4.8)
LYMPHOCYTES NFR BLD: 54.4 % (ref 18–48)
MCH RBC QN AUTO: 28 PG (ref 27–31)
MCHC RBC AUTO-ENTMCNC: 31.6 G/DL (ref 32–36)
MCV RBC AUTO: 89 FL (ref 82–98)
MONOCYTES # BLD AUTO: 0.4 K/UL (ref 0.3–1)
MONOCYTES NFR BLD: 10 % (ref 4–15)
NEUTROPHILS # BLD AUTO: 1.4 K/UL (ref 1.8–7.7)
NEUTROPHILS NFR BLD: 33.3 % (ref 38–73)
NRBC BLD-RTO: 0 /100 WBC
PLATELET # BLD AUTO: 216 K/UL (ref 150–450)
PMV BLD AUTO: 10.3 FL (ref 9.2–12.9)
POTASSIUM SERPL-SCNC: 4.1 MMOL/L (ref 3.5–5.1)
PROT SERPL-MCNC: 7.5 G/DL (ref 6–8.4)
RBC # BLD AUTO: 4.64 M/UL (ref 4–5.4)
SODIUM SERPL-SCNC: 143 MMOL/L (ref 136–145)
WBC # BLD AUTO: 4.19 K/UL (ref 3.9–12.7)

## 2021-10-19 PROCEDURE — 87045 FECES CULTURE AEROBIC BACT: CPT | Performed by: FAMILY MEDICINE

## 2021-10-19 PROCEDURE — 3078F DIAST BP <80 MM HG: CPT | Mod: CPTII,S$GLB,, | Performed by: FAMILY MEDICINE

## 2021-10-19 PROCEDURE — 3008F BODY MASS INDEX DOCD: CPT | Mod: CPTII,S$GLB,, | Performed by: FAMILY MEDICINE

## 2021-10-19 PROCEDURE — 99499 UNLISTED E&M SERVICE: CPT | Mod: S$GLB,,, | Performed by: FAMILY MEDICINE

## 2021-10-19 PROCEDURE — 3074F PR MOST RECENT SYSTOLIC BLOOD PRESSURE < 130 MM HG: ICD-10-PCS | Mod: CPTII,S$GLB,, | Performed by: FAMILY MEDICINE

## 2021-10-19 PROCEDURE — 80053 COMPREHEN METABOLIC PANEL: CPT | Performed by: FAMILY MEDICINE

## 2021-10-19 PROCEDURE — 1159F PR MEDICATION LIST DOCUMENTED IN MEDICAL RECORD: ICD-10-PCS | Mod: CPTII,S$GLB,, | Performed by: FAMILY MEDICINE

## 2021-10-19 PROCEDURE — 85025 COMPLETE CBC W/AUTO DIFF WBC: CPT | Performed by: FAMILY MEDICINE

## 2021-10-19 PROCEDURE — 1160F PR REVIEW ALL MEDS BY PRESCRIBER/CLIN PHARMACIST DOCUMENTED: ICD-10-PCS | Mod: CPTII,S$GLB,, | Performed by: FAMILY MEDICINE

## 2021-10-19 PROCEDURE — 99214 OFFICE O/P EST MOD 30 MIN: CPT | Mod: S$GLB,,, | Performed by: FAMILY MEDICINE

## 2021-10-19 PROCEDURE — 1160F RVW MEDS BY RX/DR IN RCRD: CPT | Mod: CPTII,S$GLB,, | Performed by: FAMILY MEDICINE

## 2021-10-19 PROCEDURE — 3078F PR MOST RECENT DIASTOLIC BLOOD PRESSURE < 80 MM HG: ICD-10-PCS | Mod: CPTII,S$GLB,, | Performed by: FAMILY MEDICINE

## 2021-10-19 PROCEDURE — 36415 COLL VENOUS BLD VENIPUNCTURE: CPT | Performed by: FAMILY MEDICINE

## 2021-10-19 PROCEDURE — 1159F MED LIST DOCD IN RCRD: CPT | Mod: CPTII,S$GLB,, | Performed by: FAMILY MEDICINE

## 2021-10-19 PROCEDURE — 3074F SYST BP LT 130 MM HG: CPT | Mod: CPTII,S$GLB,, | Performed by: FAMILY MEDICINE

## 2021-10-19 PROCEDURE — 87046 STOOL CULTR AEROBIC BACT EA: CPT | Mod: 59 | Performed by: FAMILY MEDICINE

## 2021-10-19 PROCEDURE — 99214 PR OFFICE/OUTPT VISIT, EST, LEVL IV, 30-39 MIN: ICD-10-PCS | Mod: S$GLB,,, | Performed by: FAMILY MEDICINE

## 2021-10-19 PROCEDURE — 3008F PR BODY MASS INDEX (BMI) DOCUMENTED: ICD-10-PCS | Mod: CPTII,S$GLB,, | Performed by: FAMILY MEDICINE

## 2021-10-19 PROCEDURE — 99499 RISK ADDL DX/OHS AUDIT: ICD-10-PCS | Mod: S$GLB,,, | Performed by: FAMILY MEDICINE

## 2021-10-19 RX ORDER — METOPROLOL TARTRATE 25 MG/1
25 TABLET, FILM COATED ORAL 2 TIMES DAILY
Qty: 180 TABLET | Refills: 1 | Status: SHIPPED | OUTPATIENT
Start: 2021-10-19 | End: 2022-03-24

## 2021-10-19 RX ORDER — ALPRAZOLAM 0.5 MG/1
0.5 TABLET ORAL 2 TIMES DAILY PRN
Qty: 30 TABLET | Refills: 2 | Status: SHIPPED | OUTPATIENT
Start: 2021-10-19 | End: 2022-05-10 | Stop reason: SDUPTHER

## 2021-10-19 RX ORDER — PANTOPRAZOLE SODIUM 40 MG/1
40 TABLET, DELAYED RELEASE ORAL DAILY
Qty: 90 TABLET | Refills: 1 | Status: SHIPPED | OUTPATIENT
Start: 2021-10-19 | End: 2022-09-28 | Stop reason: SDUPTHER

## 2021-10-19 RX ORDER — CIPROFLOXACIN 500 MG/1
500 TABLET ORAL EVERY 12 HOURS
Qty: 10 TABLET | Refills: 0 | Status: SHIPPED | OUTPATIENT
Start: 2021-10-19 | End: 2021-12-31

## 2021-10-19 RX ORDER — ONDANSETRON 4 MG/1
4 TABLET, FILM COATED ORAL EVERY 8 HOURS PRN
Qty: 30 TABLET | Refills: 1 | Status: SHIPPED | OUTPATIENT
Start: 2021-10-19 | End: 2022-09-28

## 2021-10-19 RX ORDER — AZELASTINE 1 MG/ML
1 SPRAY, METERED NASAL 2 TIMES DAILY
Qty: 90 ML | Refills: 1 | Status: SHIPPED | OUTPATIENT
Start: 2021-10-19 | End: 2022-09-28 | Stop reason: SDUPTHER

## 2021-10-19 RX ORDER — MELOXICAM 15 MG/1
15 TABLET ORAL DAILY
Qty: 90 TABLET | Refills: 1 | Status: CANCELLED | OUTPATIENT
Start: 2021-10-19

## 2021-10-19 RX ORDER — GABAPENTIN 300 MG/1
300 CAPSULE ORAL NIGHTLY
Qty: 90 CAPSULE | Refills: 1 | Status: SHIPPED | OUTPATIENT
Start: 2021-10-19 | End: 2022-07-11 | Stop reason: SDUPTHER

## 2021-10-19 RX ORDER — IBUPROFEN 800 MG/1
800 TABLET ORAL 3 TIMES DAILY PRN
Qty: 30 TABLET | Refills: 2 | Status: SHIPPED | OUTPATIENT
Start: 2021-10-19 | End: 2022-04-25

## 2021-10-19 RX ORDER — ESTRADIOL 1 MG/1
TABLET ORAL
Qty: 90 TABLET | Refills: 1 | Status: SHIPPED | OUTPATIENT
Start: 2021-10-19 | End: 2022-09-28 | Stop reason: SDUPTHER

## 2021-10-19 RX ORDER — SPIRONOLACTONE 25 MG/1
25 TABLET ORAL EVERY OTHER DAY
Qty: 45 TABLET | Refills: 1 | Status: SHIPPED | OUTPATIENT
Start: 2021-10-19 | End: 2022-09-28 | Stop reason: SDUPTHER

## 2021-10-21 LAB
STOOL CULTURE: NORMAL
STOOL CULTURE: NORMAL

## 2021-12-28 ENCOUNTER — OFFICE VISIT (OUTPATIENT)
Dept: FAMILY MEDICINE | Facility: CLINIC | Age: 61
End: 2021-12-28
Payer: COMMERCIAL

## 2021-12-28 VITALS
TEMPERATURE: 98 F | HEIGHT: 63 IN | BODY MASS INDEX: 33.84 KG/M2 | DIASTOLIC BLOOD PRESSURE: 68 MMHG | HEART RATE: 67 BPM | WEIGHT: 191 LBS | OXYGEN SATURATION: 97 % | SYSTOLIC BLOOD PRESSURE: 136 MMHG

## 2021-12-28 DIAGNOSIS — I10 HYPERTENSION, ESSENTIAL: ICD-10-CM

## 2021-12-28 DIAGNOSIS — J40 BRONCHITIS: Primary | ICD-10-CM

## 2021-12-28 DIAGNOSIS — R05.9 COUGH: ICD-10-CM

## 2021-12-28 PROCEDURE — 3075F SYST BP GE 130 - 139MM HG: CPT | Mod: CPTII,S$GLB,, | Performed by: FAMILY MEDICINE

## 2021-12-28 PROCEDURE — U0003 INFECTIOUS AGENT DETECTION BY NUCLEIC ACID (DNA OR RNA); SEVERE ACUTE RESPIRATORY SYNDROME CORONAVIRUS 2 (SARS-COV-2) (CORONAVIRUS DISEASE [COVID-19]), AMPLIFIED PROBE TECHNIQUE, MAKING USE OF HIGH THROUGHPUT TECHNOLOGIES AS DESCRIBED BY CMS-2020-01-R: HCPCS | Performed by: FAMILY MEDICINE

## 2021-12-28 PROCEDURE — 3008F PR BODY MASS INDEX (BMI) DOCUMENTED: ICD-10-PCS | Mod: CPTII,S$GLB,, | Performed by: FAMILY MEDICINE

## 2021-12-28 PROCEDURE — 1159F PR MEDICATION LIST DOCUMENTED IN MEDICAL RECORD: ICD-10-PCS | Mod: CPTII,S$GLB,, | Performed by: FAMILY MEDICINE

## 2021-12-28 PROCEDURE — 1160F RVW MEDS BY RX/DR IN RCRD: CPT | Mod: CPTII,S$GLB,, | Performed by: FAMILY MEDICINE

## 2021-12-28 PROCEDURE — 1159F MED LIST DOCD IN RCRD: CPT | Mod: CPTII,S$GLB,, | Performed by: FAMILY MEDICINE

## 2021-12-28 PROCEDURE — 3078F PR MOST RECENT DIASTOLIC BLOOD PRESSURE < 80 MM HG: ICD-10-PCS | Mod: CPTII,S$GLB,, | Performed by: FAMILY MEDICINE

## 2021-12-28 PROCEDURE — 3075F PR MOST RECENT SYSTOLIC BLOOD PRESS GE 130-139MM HG: ICD-10-PCS | Mod: CPTII,S$GLB,, | Performed by: FAMILY MEDICINE

## 2021-12-28 PROCEDURE — 3008F BODY MASS INDEX DOCD: CPT | Mod: CPTII,S$GLB,, | Performed by: FAMILY MEDICINE

## 2021-12-28 PROCEDURE — 3078F DIAST BP <80 MM HG: CPT | Mod: CPTII,S$GLB,, | Performed by: FAMILY MEDICINE

## 2021-12-28 PROCEDURE — 99213 PR OFFICE/OUTPT VISIT, EST, LEVL III, 20-29 MIN: ICD-10-PCS | Mod: S$GLB,,, | Performed by: FAMILY MEDICINE

## 2021-12-28 PROCEDURE — U0005 INFEC AGEN DETEC AMPLI PROBE: HCPCS | Performed by: FAMILY MEDICINE

## 2021-12-28 PROCEDURE — 1160F PR REVIEW ALL MEDS BY PRESCRIBER/CLIN PHARMACIST DOCUMENTED: ICD-10-PCS | Mod: CPTII,S$GLB,, | Performed by: FAMILY MEDICINE

## 2021-12-28 PROCEDURE — 99213 OFFICE O/P EST LOW 20 MIN: CPT | Mod: S$GLB,,, | Performed by: FAMILY MEDICINE

## 2021-12-28 RX ORDER — DOXYCYCLINE 100 MG/1
100 CAPSULE ORAL EVERY 12 HOURS
Qty: 20 CAPSULE | Refills: 0 | Status: SHIPPED | OUTPATIENT
Start: 2021-12-28 | End: 2022-03-24

## 2021-12-29 ENCOUNTER — PATIENT MESSAGE (OUTPATIENT)
Dept: FAMILY MEDICINE | Facility: CLINIC | Age: 61
End: 2021-12-29
Payer: COMMERCIAL

## 2021-12-30 LAB
SARS-COV-2 RNA RESP QL NAA+PROBE: NOT DETECTED
SARS-COV-2- CYCLE NUMBER: 33

## 2022-03-09 ENCOUNTER — TELEPHONE (OUTPATIENT)
Dept: NEUROLOGY | Facility: CLINIC | Age: 62
End: 2022-03-09
Payer: COMMERCIAL

## 2022-03-09 RX ORDER — LANOLIN ALCOHOL/MO/W.PET/CERES
400 CREAM (GRAM) TOPICAL DAILY
COMMUNITY
End: 2022-03-09 | Stop reason: SDUPTHER

## 2022-03-09 RX ORDER — LANOLIN ALCOHOL/MO/W.PET/CERES
400 CREAM (GRAM) TOPICAL DAILY
Qty: 90 TABLET | Refills: 1 | Status: SHIPPED | OUTPATIENT
Start: 2022-03-09 | End: 2022-09-28 | Stop reason: SDUPTHER

## 2022-03-09 NOTE — TELEPHONE ENCOUNTER
----- Message from Andrew Perez sent at 3/9/2022  9:59 AM CST -----  Type:  RX Refill Request    Who Called:  Patient  Refill or New Rx:  Refill  RX Name and Strength:  Magnesium Oxide-- Not on file  How is the patient currently taking it? (ex. 1XDay):  2XDay  Is this a 30 day or 90 day RX: 90    Preferred Pharmacy with phone number:   [facility] #[id number] [place], [place]  [address] [place] [address] & [address] [address]  Phone: [phone] Fax: [phone]          Local or Mail Order: Local  Ordering Provider: Rl Romero Call Back Number:  [phone]  Additional Information: Please call to confirm when sent to pharmacy

## 2022-03-09 NOTE — TELEPHONE ENCOUNTER
----- Message from Estefany Serrano sent at 3/9/2022  2:10 PM CST -----  Type:  RX Refill Request  Who Called: Patient  Refill or New Rx: refill  RX Name and Strength: not listed in chart  How is the patient currently taking it? (ex. 1XDay):    Is this a 30 day or 90 day RX:    Preferred Pharmacy with phone number:  [facility] #[id number] [address]   Phone:  [phone]  Fax:  [phone]  Local or Mail Order:  Local  Ordering Provider:    Nick Call Back Number:  [phone]  Additional Information: Pt requesting a call back concerning a refill for medication not listed in chart.

## 2022-03-09 NOTE — TELEPHONE ENCOUNTER
Called patient and notified that she has not been seen since 2020. If she needs medication now, she can try to get from her PCP. Patient requested appointment. Scheduled for first available follow up. Date/Time confirmed. Verbalized understanding.

## 2022-03-16 DIAGNOSIS — Z12.31 OTHER SCREENING MAMMOGRAM: ICD-10-CM

## 2022-03-21 ENCOUNTER — PATIENT MESSAGE (OUTPATIENT)
Dept: ADMINISTRATIVE | Facility: HOSPITAL | Age: 62
End: 2022-03-21
Payer: COMMERCIAL

## 2022-03-24 ENCOUNTER — TELEPHONE (OUTPATIENT)
Dept: FAMILY MEDICINE | Facility: CLINIC | Age: 62
End: 2022-03-24
Payer: COMMERCIAL

## 2022-03-24 ENCOUNTER — OFFICE VISIT (OUTPATIENT)
Dept: FAMILY MEDICINE | Facility: CLINIC | Age: 62
End: 2022-03-24
Payer: COMMERCIAL

## 2022-03-24 VITALS
OXYGEN SATURATION: 96 % | WEIGHT: 188 LBS | SYSTOLIC BLOOD PRESSURE: 136 MMHG | DIASTOLIC BLOOD PRESSURE: 84 MMHG | HEART RATE: 68 BPM | BODY MASS INDEX: 33.31 KG/M2 | TEMPERATURE: 98 F | HEIGHT: 63 IN

## 2022-03-24 DIAGNOSIS — M54.50 LOW BACK PAIN WITHOUT SCIATICA, UNSPECIFIED BACK PAIN LATERALITY, UNSPECIFIED CHRONICITY: ICD-10-CM

## 2022-03-24 DIAGNOSIS — Z11.59 ENCOUNTER FOR HEPATITIS C SCREENING TEST FOR LOW RISK PATIENT: ICD-10-CM

## 2022-03-24 DIAGNOSIS — I10 HYPERTENSION, ESSENTIAL: Primary | ICD-10-CM

## 2022-03-24 DIAGNOSIS — Z12.31 BREAST CANCER SCREENING BY MAMMOGRAM: ICD-10-CM

## 2022-03-24 DIAGNOSIS — Z11.4 SCREENING FOR HIV (HUMAN IMMUNODEFICIENCY VIRUS): ICD-10-CM

## 2022-03-24 DIAGNOSIS — M79.10 MYALGIA: ICD-10-CM

## 2022-03-24 DIAGNOSIS — J32.9 SINUSITIS, UNSPECIFIED CHRONICITY, UNSPECIFIED LOCATION: ICD-10-CM

## 2022-03-24 PROCEDURE — 3075F SYST BP GE 130 - 139MM HG: CPT | Mod: CPTII,S$GLB,, | Performed by: FAMILY MEDICINE

## 2022-03-24 PROCEDURE — 1159F PR MEDICATION LIST DOCUMENTED IN MEDICAL RECORD: ICD-10-PCS | Mod: CPTII,S$GLB,, | Performed by: FAMILY MEDICINE

## 2022-03-24 PROCEDURE — 90750 ZOSTER RECOMBINANT VACCINE: ICD-10-PCS | Mod: S$GLB,,, | Performed by: FAMILY MEDICINE

## 2022-03-24 PROCEDURE — 90471 IMMUNIZATION ADMIN: CPT | Mod: S$GLB,,, | Performed by: FAMILY MEDICINE

## 2022-03-24 PROCEDURE — 99214 PR OFFICE/OUTPT VISIT, EST, LEVL IV, 30-39 MIN: ICD-10-PCS | Mod: 25,S$GLB,, | Performed by: FAMILY MEDICINE

## 2022-03-24 PROCEDURE — 1160F PR REVIEW ALL MEDS BY PRESCRIBER/CLIN PHARMACIST DOCUMENTED: ICD-10-PCS | Mod: CPTII,S$GLB,, | Performed by: FAMILY MEDICINE

## 2022-03-24 PROCEDURE — 3008F PR BODY MASS INDEX (BMI) DOCUMENTED: ICD-10-PCS | Mod: CPTII,S$GLB,, | Performed by: FAMILY MEDICINE

## 2022-03-24 PROCEDURE — 90715 TDAP VACCINE GREATER THAN OR EQUAL TO 7YO IM: ICD-10-PCS | Mod: S$GLB,,, | Performed by: FAMILY MEDICINE

## 2022-03-24 PROCEDURE — 3079F PR MOST RECENT DIASTOLIC BLOOD PRESSURE 80-89 MM HG: ICD-10-PCS | Mod: CPTII,S$GLB,, | Performed by: FAMILY MEDICINE

## 2022-03-24 PROCEDURE — 90471 TDAP VACCINE GREATER THAN OR EQUAL TO 7YO IM: ICD-10-PCS | Mod: S$GLB,,, | Performed by: FAMILY MEDICINE

## 2022-03-24 PROCEDURE — 99214 OFFICE O/P EST MOD 30 MIN: CPT | Mod: 25,S$GLB,, | Performed by: FAMILY MEDICINE

## 2022-03-24 PROCEDURE — 99499 UNLISTED E&M SERVICE: CPT | Mod: S$GLB,,, | Performed by: FAMILY MEDICINE

## 2022-03-24 PROCEDURE — 90750 HZV VACC RECOMBINANT IM: CPT | Mod: S$GLB,,, | Performed by: FAMILY MEDICINE

## 2022-03-24 PROCEDURE — 3075F PR MOST RECENT SYSTOLIC BLOOD PRESS GE 130-139MM HG: ICD-10-PCS | Mod: CPTII,S$GLB,, | Performed by: FAMILY MEDICINE

## 2022-03-24 PROCEDURE — 1159F MED LIST DOCD IN RCRD: CPT | Mod: CPTII,S$GLB,, | Performed by: FAMILY MEDICINE

## 2022-03-24 PROCEDURE — 99499 RISK ADDL DX/OHS AUDIT: ICD-10-PCS | Mod: S$GLB,,, | Performed by: FAMILY MEDICINE

## 2022-03-24 PROCEDURE — 1160F RVW MEDS BY RX/DR IN RCRD: CPT | Mod: CPTII,S$GLB,, | Performed by: FAMILY MEDICINE

## 2022-03-24 PROCEDURE — 90472 ZOSTER RECOMBINANT VACCINE: ICD-10-PCS | Mod: S$GLB,,, | Performed by: FAMILY MEDICINE

## 2022-03-24 PROCEDURE — 90472 IMMUNIZATION ADMIN EACH ADD: CPT | Mod: S$GLB,,, | Performed by: FAMILY MEDICINE

## 2022-03-24 PROCEDURE — 90715 TDAP VACCINE 7 YRS/> IM: CPT | Mod: S$GLB,,, | Performed by: FAMILY MEDICINE

## 2022-03-24 PROCEDURE — 3008F BODY MASS INDEX DOCD: CPT | Mod: CPTII,S$GLB,, | Performed by: FAMILY MEDICINE

## 2022-03-24 PROCEDURE — 3079F DIAST BP 80-89 MM HG: CPT | Mod: CPTII,S$GLB,, | Performed by: FAMILY MEDICINE

## 2022-03-24 RX ORDER — PREDNISONE 20 MG/1
20 TABLET ORAL DAILY
Qty: 5 TABLET | Refills: 0 | Status: SHIPPED | OUTPATIENT
Start: 2022-03-24 | End: 2022-05-11

## 2022-03-24 RX ORDER — METOPROLOL SUCCINATE 50 MG/1
50 TABLET, EXTENDED RELEASE ORAL DAILY
Qty: 90 TABLET | Refills: 1 | Status: SHIPPED | OUTPATIENT
Start: 2022-03-24 | End: 2022-09-28 | Stop reason: SDUPTHER

## 2022-03-24 RX ORDER — CEFUROXIME AXETIL 250 MG/1
250 TABLET ORAL EVERY 12 HOURS
Qty: 20 TABLET | Refills: 0 | Status: SHIPPED | OUTPATIENT
Start: 2022-03-24 | End: 2022-05-11

## 2022-03-24 NOTE — TELEPHONE ENCOUNTER
Going to hair appt at 11 today. Made appt 350 she took med bp down to 170s    ----- Message from Benjamin Cardoso sent at 3/24/2022  9:23 AM CDT -----  Contact: Self  Type:  Same Day Appointment Request    Caller is requesting a same day appointment.  Caller declined first available appointment listed below.      Name of Caller:  Patient  When is the first available appointment?  3/29  Symptoms:  hbp  Best Call Back Number:  [phone]   Additional Information:   States reading 194/93

## 2022-03-25 ENCOUNTER — LAB VISIT (OUTPATIENT)
Dept: LAB | Facility: HOSPITAL | Age: 62
End: 2022-03-25
Attending: FAMILY MEDICINE
Payer: COMMERCIAL

## 2022-03-25 DIAGNOSIS — Z11.4 SCREENING FOR HIV (HUMAN IMMUNODEFICIENCY VIRUS): ICD-10-CM

## 2022-03-25 DIAGNOSIS — Z11.59 ENCOUNTER FOR HEPATITIS C SCREENING TEST FOR LOW RISK PATIENT: ICD-10-CM

## 2022-03-25 DIAGNOSIS — M79.10 MYALGIA: ICD-10-CM

## 2022-03-25 DIAGNOSIS — M54.50 LOW BACK PAIN WITHOUT SCIATICA, UNSPECIFIED BACK PAIN LATERALITY, UNSPECIFIED CHRONICITY: ICD-10-CM

## 2022-03-25 LAB — ERYTHROCYTE [SEDIMENTATION RATE] IN BLOOD BY WESTERGREN METHOD: 8 MM/HR (ref 0–20)

## 2022-03-25 PROCEDURE — 86803 HEPATITIS C AB TEST: CPT | Performed by: FAMILY MEDICINE

## 2022-03-25 PROCEDURE — 87389 HIV-1 AG W/HIV-1&-2 AB AG IA: CPT | Performed by: FAMILY MEDICINE

## 2022-03-25 PROCEDURE — 36415 COLL VENOUS BLD VENIPUNCTURE: CPT | Performed by: FAMILY MEDICINE

## 2022-03-25 PROCEDURE — 85651 RBC SED RATE NONAUTOMATED: CPT | Performed by: FAMILY MEDICINE

## 2022-03-26 LAB
HCV AB S/CO SERPL IA: <0.1 S/CO RATIO (ref 0–0.9)
HIV 1+2 AB+HIV1 P24 AG SERPL QL IA: NON REACTIVE

## 2022-03-26 NOTE — PROGRESS NOTES
Subjective:       Patient ID: Paula Hanna is a 62 y.o. female.    Chief Complaint: Hypertension    Headache and congestion.  Ears and neck pain.  No fever cough however.  Sinus pressure.  Hypertension blood pressure had been little elevated.  Decreased her blood pressure medicine to daily.  Now running a little high.  This is the metoprolol that was b.i.d..  Also lower back pain for 4 days.  No leg pain just pain in the buttocks only.  She is allergic to penicillin but can take cephalosporin antibiotics has taken Keflex before.    Physical examination vital signs noted.  No acute distress.  Tympanic membranes without erythema.  Pharynx without erythema nose congested.  Neck is supple without bruit.  Chest clear.  Heart regular rate and rhythm.  Extremities without edema.  Lower back is tender to palpation.  Straight leg raising is negative.  Some painful flexion extension.    Review of Systems    Objective:      Physical Exam    Assessment:       1. Hypertension, essential    2. Low back pain without sciatica, unspecified back pain laterality, unspecified chronicity    3. Myalgia    4. Screening for HIV (human immunodeficiency virus)    5. Encounter for hepatitis C screening test for low risk patient    6. Breast cancer screening by mammogram    7. Psoriatic arthritis        Plan:       Hypertension, essential    Low back pain without sciatica, unspecified back pain laterality, unspecified chronicity  -     Sedimentation rate; Future; Expected date: 03/24/2022    Myalgia  -     Sedimentation rate; Future; Expected date: 03/24/2022    Screening for HIV (human immunodeficiency virus)  -     HIV 1/2 Ag/Ab (4th Gen); Future; Expected date: 03/24/2022    Encounter for hepatitis C screening test for low risk patient  -     Hepatitis C Antibody; Future; Expected date: 03/24/2022    Breast cancer screening by mammogram  -     Mammo Digital Screening Bilat; Future; Expected date: 03/24/2022    Psoriatic  arthritis    Other orders  -     metoprolol succinate (TOPROL-XL) 50 MG 24 hr tablet; Take 1 tablet (50 mg total) by mouth once daily.  Dispense: 90 tablet; Refill: 1  -     cefUROXime (CEFTIN) 250 MG tablet; Take 1 tablet (250 mg total) by mouth every 12 (twelve) hours.  Dispense: 20 tablet; Refill: 0  -     predniSONE (DELTASONE) 20 MG tablet; Take 1 tablet (20 mg total) by mouth once daily.  Dispense: 5 tablet; Refill: 0  -     (In Office Administered) Tdap Vaccine  -     Zoster Recombinant Vaccine    Will discontinue the b.i.d. Lopressor and switch Toprol XL 50 daily.  She is due for mammogram will send her for this.  Shingles vaccine 1..  TD AP.  She can take cephalosporins so will do Ceftin 250 b.i.d. for 10 days for the sinuses.  As well as some prednisone 20 mg a day for 5 days which should help the back and the sinuses both.

## 2022-04-01 ENCOUNTER — PES CALL (OUTPATIENT)
Dept: ADMINISTRATIVE | Facility: CLINIC | Age: 62
End: 2022-04-01
Payer: COMMERCIAL

## 2022-04-15 ENCOUNTER — HOSPITAL ENCOUNTER (OUTPATIENT)
Dept: RADIOLOGY | Facility: HOSPITAL | Age: 62
Discharge: HOME | End: 2022-04-15
Attending: FAMILY MEDICINE
Payer: COMMERCIAL

## 2022-04-15 VITALS — BODY MASS INDEX: 33.32 KG/M2 | HEIGHT: 63 IN | WEIGHT: 188.06 LBS

## 2022-04-15 DIAGNOSIS — Z12.31 BREAST CANCER SCREENING BY MAMMOGRAM: ICD-10-CM

## 2022-04-15 PROCEDURE — 77067 SCR MAMMO BI INCL CAD: CPT | Mod: TC,PO

## 2022-04-21 RX ORDER — IBUPROFEN 800 MG/1
800 TABLET ORAL 3 TIMES DAILY PRN
Qty: 30 TABLET | Refills: 2 | OUTPATIENT
Start: 2022-04-21

## 2022-04-21 RX ORDER — ALPRAZOLAM 0.5 MG/1
0.5 TABLET ORAL 2 TIMES DAILY PRN
Qty: 30 TABLET | Refills: 2 | OUTPATIENT
Start: 2022-04-21

## 2022-05-10 ENCOUNTER — OFFICE VISIT (OUTPATIENT)
Dept: FAMILY MEDICINE | Facility: CLINIC | Age: 62
End: 2022-05-10
Payer: COMMERCIAL

## 2022-05-10 ENCOUNTER — TELEPHONE (OUTPATIENT)
Dept: FAMILY MEDICINE | Facility: CLINIC | Age: 62
End: 2022-05-10
Payer: COMMERCIAL

## 2022-05-10 VITALS
OXYGEN SATURATION: 94 % | DIASTOLIC BLOOD PRESSURE: 88 MMHG | HEIGHT: 63 IN | BODY MASS INDEX: 32.78 KG/M2 | SYSTOLIC BLOOD PRESSURE: 130 MMHG | HEART RATE: 64 BPM | WEIGHT: 185 LBS | TEMPERATURE: 99 F

## 2022-05-10 DIAGNOSIS — J02.9 PHARYNGITIS, UNSPECIFIED ETIOLOGY: ICD-10-CM

## 2022-05-10 DIAGNOSIS — Z79.82 ASPIRIN LONG-TERM USE: ICD-10-CM

## 2022-05-10 DIAGNOSIS — F41.9 ANXIETY: Primary | ICD-10-CM

## 2022-05-10 DIAGNOSIS — R05.9 COUGH: ICD-10-CM

## 2022-05-10 DIAGNOSIS — J40 BRONCHITIS: ICD-10-CM

## 2022-05-10 PROCEDURE — 3008F BODY MASS INDEX DOCD: CPT | Mod: CPTII,S$GLB,, | Performed by: FAMILY MEDICINE

## 2022-05-10 PROCEDURE — U0003 INFECTIOUS AGENT DETECTION BY NUCLEIC ACID (DNA OR RNA); SEVERE ACUTE RESPIRATORY SYNDROME CORONAVIRUS 2 (SARS-COV-2) (CORONAVIRUS DISEASE [COVID-19]), AMPLIFIED PROBE TECHNIQUE, MAKING USE OF HIGH THROUGHPUT TECHNOLOGIES AS DESCRIBED BY CMS-2020-01-R: HCPCS | Performed by: FAMILY MEDICINE

## 2022-05-10 PROCEDURE — 3079F DIAST BP 80-89 MM HG: CPT | Mod: CPTII,S$GLB,, | Performed by: FAMILY MEDICINE

## 2022-05-10 PROCEDURE — 99213 OFFICE O/P EST LOW 20 MIN: CPT | Mod: S$GLB,,, | Performed by: FAMILY MEDICINE

## 2022-05-10 PROCEDURE — 3075F PR MOST RECENT SYSTOLIC BLOOD PRESS GE 130-139MM HG: ICD-10-PCS | Mod: CPTII,S$GLB,, | Performed by: FAMILY MEDICINE

## 2022-05-10 PROCEDURE — 3008F PR BODY MASS INDEX (BMI) DOCUMENTED: ICD-10-PCS | Mod: CPTII,S$GLB,, | Performed by: FAMILY MEDICINE

## 2022-05-10 PROCEDURE — 1159F MED LIST DOCD IN RCRD: CPT | Mod: CPTII,S$GLB,, | Performed by: FAMILY MEDICINE

## 2022-05-10 PROCEDURE — 1160F RVW MEDS BY RX/DR IN RCRD: CPT | Mod: CPTII,S$GLB,, | Performed by: FAMILY MEDICINE

## 2022-05-10 PROCEDURE — 1160F PR REVIEW ALL MEDS BY PRESCRIBER/CLIN PHARMACIST DOCUMENTED: ICD-10-PCS | Mod: CPTII,S$GLB,, | Performed by: FAMILY MEDICINE

## 2022-05-10 PROCEDURE — 99213 PR OFFICE/OUTPT VISIT, EST, LEVL III, 20-29 MIN: ICD-10-PCS | Mod: S$GLB,,, | Performed by: FAMILY MEDICINE

## 2022-05-10 PROCEDURE — U0005 INFEC AGEN DETEC AMPLI PROBE: HCPCS | Performed by: FAMILY MEDICINE

## 2022-05-10 PROCEDURE — 3079F PR MOST RECENT DIASTOLIC BLOOD PRESSURE 80-89 MM HG: ICD-10-PCS | Mod: CPTII,S$GLB,, | Performed by: FAMILY MEDICINE

## 2022-05-10 PROCEDURE — 1159F PR MEDICATION LIST DOCUMENTED IN MEDICAL RECORD: ICD-10-PCS | Mod: CPTII,S$GLB,, | Performed by: FAMILY MEDICINE

## 2022-05-10 PROCEDURE — 3075F SYST BP GE 130 - 139MM HG: CPT | Mod: CPTII,S$GLB,, | Performed by: FAMILY MEDICINE

## 2022-05-10 RX ORDER — ALPRAZOLAM 0.5 MG/1
0.5 TABLET ORAL NIGHTLY PRN
Qty: 30 TABLET | Refills: 2 | Status: SHIPPED | OUTPATIENT
Start: 2022-05-10 | End: 2022-09-28 | Stop reason: SDUPTHER

## 2022-05-10 RX ORDER — DOXYCYCLINE 100 MG/1
100 CAPSULE ORAL EVERY 12 HOURS
Qty: 20 CAPSULE | Refills: 0 | Status: SHIPPED | OUTPATIENT
Start: 2022-05-10 | End: 2022-09-28

## 2022-05-10 RX ORDER — ALBUTEROL SULFATE 90 UG/1
2 AEROSOL, METERED RESPIRATORY (INHALATION) EVERY 4 HOURS PRN
Qty: 18 G | Refills: 0 | Status: SHIPPED | OUTPATIENT
Start: 2022-05-10 | End: 2022-09-28 | Stop reason: SDUPTHER

## 2022-05-10 NOTE — TELEPHONE ENCOUNTER
Coming 250 today  ----- Message from Elizabethstef Bennett sent at 5/10/2022  7:54 AM CDT -----  Contact: pt  Type: Sooner Appt Request    Caller is requesting a sooner appt.  Caller declined first available listed below.  Caller will not accept being placed on the wait list and are requesting a message be sent to the doctor.    Name of Caller:Pt   '  When is the 1st available appt: 05/24    Symptoms: Congestion, cough, sore throat and lost her voice     Best Call Back #:[phone]    Additional Info-Please advise-Thank you~

## 2022-05-11 LAB
SARS-COV-2 RNA RESP QL NAA+PROBE: NOT DETECTED
SARS-COV-2- CYCLE NUMBER: NORMAL

## 2022-05-15 NOTE — PROGRESS NOTES
Subjective:       Patient ID: Paula Hanna is a 62 y.o. female.    Chief Complaint: Cough    Sore throat some yellow mucus.  Chest congestion.  Sick for about 5 days.  Using aspirin.  Bronchitis symptoms now.  No fever chills.  Wheezing some.  Three doses of COVID vaccine.  Home COVID test -2 days ago.  Also anxiety issues.  Needs refill of Xanax.  Caring for niece with stage IV breast cancer.    Physical examination.  Vital signs noted.  Tympanic membranes without erythema.  Chest clear.  Heart regular rate rhythm.  Extremities without edema positive pulses.    Review of Systems    Objective:      Physical Exam    Assessment:       1. Anxiety    2. Pharyngitis, unspecified etiology    3. Aspirin long-term use    4. Bronchitis    5. Cough        Plan:       Anxiety    Pharyngitis, unspecified etiology    Aspirin long-term use    Bronchitis    Cough  -     COVID-19 Routine Screening    Other orders  -     ALPRAZolam (XANAX) 0.5 MG tablet; Take 1 tablet (0.5 mg total) by mouth nightly as needed for Anxiety.  Dispense: 30 tablet; Refill: 2  -     doxycycline (VIBRAMYCIN) 100 MG Cap; Take 1 capsule (100 mg total) by mouth every 12 (twelve) hours.  Dispense: 20 capsule; Refill: 0  -     albuterol (PROVENTIL HFA) 90 mcg/actuation inhaler; Inhale 2 puffs into the lungs every 4 (four) hours as needed for Wheezing. Rescue  Dispense: 18 g; Refill: 0  -     SARS-COV-2- Cycle Number      Refill the Xanax 0.5 daily maximum 30 with 2 refills.  COVID nasal swab ordered.  Albuterol HFA 2 puffs q.4 hours p.r.n. wheezing.  Vibramycin 100 mg b.i.d. for 10 days.

## 2022-07-11 ENCOUNTER — OFFICE VISIT (OUTPATIENT)
Dept: ORTHOPEDICS | Facility: CLINIC | Age: 62
End: 2022-07-11
Payer: COMMERCIAL

## 2022-07-11 VITALS — HEIGHT: 63 IN | BODY MASS INDEX: 32.78 KG/M2 | WEIGHT: 185 LBS

## 2022-07-11 DIAGNOSIS — M47.816 LUMBAR FACET ARTHROPATHY: ICD-10-CM

## 2022-07-11 DIAGNOSIS — M51.36 DISC DEGENERATION, LUMBAR: ICD-10-CM

## 2022-07-11 DIAGNOSIS — M48.061 FORAMINAL STENOSIS OF LUMBAR REGION: Primary | ICD-10-CM

## 2022-07-11 PROCEDURE — 1160F RVW MEDS BY RX/DR IN RCRD: CPT | Mod: CPTII,S$GLB,, | Performed by: ORTHOPAEDIC SURGERY

## 2022-07-11 PROCEDURE — 3008F PR BODY MASS INDEX (BMI) DOCUMENTED: ICD-10-PCS | Mod: CPTII,S$GLB,, | Performed by: ORTHOPAEDIC SURGERY

## 2022-07-11 PROCEDURE — 1160F PR REVIEW ALL MEDS BY PRESCRIBER/CLIN PHARMACIST DOCUMENTED: ICD-10-PCS | Mod: CPTII,S$GLB,, | Performed by: ORTHOPAEDIC SURGERY

## 2022-07-11 PROCEDURE — 1159F PR MEDICATION LIST DOCUMENTED IN MEDICAL RECORD: ICD-10-PCS | Mod: CPTII,S$GLB,, | Performed by: ORTHOPAEDIC SURGERY

## 2022-07-11 PROCEDURE — 1159F MED LIST DOCD IN RCRD: CPT | Mod: CPTII,S$GLB,, | Performed by: ORTHOPAEDIC SURGERY

## 2022-07-11 PROCEDURE — 99213 PR OFFICE/OUTPT VISIT, EST, LEVL III, 20-29 MIN: ICD-10-PCS | Mod: S$GLB,,, | Performed by: ORTHOPAEDIC SURGERY

## 2022-07-11 PROCEDURE — 99213 OFFICE O/P EST LOW 20 MIN: CPT | Mod: S$GLB,,, | Performed by: ORTHOPAEDIC SURGERY

## 2022-07-11 PROCEDURE — 3008F BODY MASS INDEX DOCD: CPT | Mod: CPTII,S$GLB,, | Performed by: ORTHOPAEDIC SURGERY

## 2022-07-11 RX ORDER — GABAPENTIN 300 MG/1
300 CAPSULE ORAL NIGHTLY
Qty: 90 CAPSULE | Refills: 1 | Status: SHIPPED | OUTPATIENT
Start: 2022-07-11 | End: 2022-09-28 | Stop reason: SDUPTHER

## 2022-07-11 NOTE — PROGRESS NOTES
Subjective:       Patient ID: Paula Hanna is a 62 y.o. female.    Chief Complaint: Pain of the Lumbar Spine (Patient is having lumbar pain, this pain started about two months ago, limited standing and walking, the pain is on the left side on back, pain radiates down leg, pain worsens with activity.)      History of Present Illness    Prior to meeting with the patient I reviewed the medical chart in [facility]. This included reviewing the previous progress notes from our office, review of the patient's last appointment with their primary care provider, review of any visits to the emergency room, and review of any pain management appointments or procedures.   Patient returns for reassessment chronic continued chronic chronic left-sided back pain worse with activity does have some radiation to left leg no bowel or bladder dysfunction had an MRI in January 2021 have return for results    Current Medications  Current Outpatient Medications   Medication Sig Dispense Refill    albuterol (PROVENTIL HFA) 90 mcg/actuation inhaler Inhale 2 puffs into the lungs every 4 (four) hours as needed for Wheezing. Rescue 18 g 0    ALPRAZolam (XANAX) 0.5 MG tablet Take 1 tablet (0.5 mg total) by mouth nightly as needed for Anxiety. 30 tablet 2    aspirin (ECOTRIN) 81 MG EC tablet Take 81 mg by mouth once daily.      azelastine (ASTELIN) 137 mcg (0.1 %) nasal spray 1 spray (137 mcg total) by Nasal route 2 (two) times daily. 90 mL 1    docusate sodium (COLACE) 100 MG capsule Take 1 capsule (100 mg total) by mouth 2 (two) times daily as needed for Constipation. 60 capsule 1    estradioL (ESTRACE) 1 MG tablet TAKE 1 TABLET(1 MG) BY MOUTH EVERY DAY 90 tablet 1    fluticasone propionate (FLONASE) 50 mcg/actuation nasal spray 1 spray (50 mcg total) by Each Nostril route once daily. 16 g 5    gabapentin (NEURONTIN) 300 MG capsule Take 1 capsule (300 mg total) by mouth every evening. 90 capsule 1    ibuprofen (ADVIL,MOTRIN) 800 MG tablet  TAKE 1 TABLET( 800 MG TOTAL) BY MOUTH EVERY 8 HOURS( 3 TIMES DAILY) AS NEEDED FOR PAIN 30 tablet 2    levocetirizine (XYZAL) 5 MG tablet TAKE 1 TABLET(5 MG) BY MOUTH EVERY EVENING 90 tablet 1    magnesium oxide (MAG-OX) 400 mg (241.3 mg magnesium) tablet Take 1 tablet (400 mg total) by mouth once daily. 90 tablet 1    metoprolol succinate (TOPROL-XL) 50 MG 24 hr tablet Take 1 tablet (50 mg total) by mouth once daily. 90 tablet 1    ondansetron (ZOFRAN) 4 MG tablet Take 1 tablet (4 mg total) by mouth every 8 (eight) hours as needed for Nausea. 30 tablet 1    pantoprazole (PROTONIX) 40 MG tablet Take 1 tablet (40 mg total) by mouth once daily. 90 tablet 1    polyethylene glycol (GLYCOLAX) 17 gram/dose powder Take 17 g by mouth once daily.   0    spironolactone (ALDACTONE) 25 MG tablet Take 1 tablet (25 mg total) by mouth every other day. 45 tablet 1    doxycycline (VIBRAMYCIN) 100 MG Cap Take 1 capsule (100 mg total) by mouth every 12 (twelve) hours. (Patient not taking: Reported on 7/11/2022) 20 capsule 0     No current facility-administered medications for this visit.       Allergies  Review of patient's allergies indicates:   Allergen Reactions    Statins-hmg-coa reductase inhibitors Other (See Comments)     Muscle and joint pain      Milk containing products     Naproxen Itching    Penicillins Itching     Pt states can take Keflex    Pineapple     Sulfa (sulfonamide antibiotics) Itching       Past Medical History  Past Medical History:   Diagnosis Date    Anxiety     Back pain     Degenerative disc disease     GERD (gastroesophageal reflux disease)     Heel spur     Hypertension     Hypertrophy of both inferior nasal turbinates     Nasal septal deviation     Psoriasis     Sciatica        Surgical History  Past Surgical History:   Procedure Laterality Date    HYSTERECTOMY  1999    Ileocolectomy  2000        KNEE ARTHROSCOPY W/ DEBRIDEMENT      x 2, right    LAPAROSCOPIC  CHOLECYSTECTOMY N/A 2021    Procedure: CHOLECYSTECTOMY, LAPAROSCOPIC;  Surgeon: Pepe Cool MD;  Location: [facility] OR;  Service: General;  Laterality: N/A;    LUMBAR EPIDURAL INJECTION      TONSILLECTOMY      WRIST SURGERY Right     plate       Family History:   Family History   Problem Relation Age of Onset    Hypertension Father     Cancer Father     Hypertension Mother     Heart disease Mother     Diabetes Brother     Hypertension Brother     Prostate cancer Brother     Diabetes Sister     Hypertension Sister     Ovarian cancer Neg Hx     Breast cancer Neg Hx        Social History:   Social History     Socioeconomic History    Marital status:    Tobacco Use    Smoking status: Former Smoker     Packs/day: 0.25     Years: 22.00     Pack years: 5.50     Quit date: 3/1/2017     Years since quittin.3    Smokeless tobacco: Never Used    Tobacco comment: less than 1 pack week   Substance and Sexual Activity    Alcohol use: Yes     Comment: socially    Drug use: No    Sexual activity: Yes     Partners: Male     Birth control/protection: None, Surgical       Hospitalization/Major Diagnostic Procedure:     Review of Systems     General/Constitutional:  Chills denies. Fatigue denies. Fever denies. Weight gain denies. Weight loss denies.    Respiratory:  Shortness of breath denies.    Cardiovascular:  Chest pain denies.    Gastrointestinal:  Constipation denies. Diarrhea denies. Nausea denies. Vomiting denies.     Hematology:  Easy bruising denies. Prolonged bleeding denies.     Genitourinary:  Frequent urination denies. Pain in lower back denies. Painful urination denies.     Musculoskeletal:  See HPI for details    Skin:  Rash denies.    Neurologic:  Dizziness denies. Gait abnormalities denies. Seizures denies. Tingling/Numbess denies.    Psychiatric:  Anxiety denies. Depressed mood denies.     Objective:   Vital Signs: There were no vitals filed for this visit.     Physical Exam       General Examination:     Constitutional: The patient is alert and oriented to lace person and time. Mood is pleasant.     Head/Face: Normal facial features normal eyebrows    Eyes: Normal extraocular motion bilaterally    Lungs: Respirations are equal and unlabored    Gait is coordinated.    Cardiovascular: There are no swelling or varicosities present.    Lymphatic: Negative for adenopathy    Skin: Normal    Neurological: Level of consciousness normal. Oriented to place person and time and situation    Psychiatric: Oriented to time place person and situation    Patient stand erect has tenderness over the left posterior iliac spine range of motion moderate restricted pain with extension and bending range of motion limited due to pain straight-leg-raising negative except back pain.    XRAY Report/ Interpretation :  AP and lateral x-rays of lumbar spine will performed today. Indications low back pain. Findings:  I did also reviewed previous MRI study please see report for details         MRI Lumbar Spine Without Contrast  Order: [id number]   Status: Final result     Visible to patient: Yes (seen)     Next appt: None     Dx: Lumbar facet arthropathy; Disc degene...     0 Result Notes    Details    Reading Physician Reading Date Result Priority   Lidia Charlton MD  [phone] 1/15/2021      Narrative & Impression  MRI of the lumbar spine     Clinical history is low back pain radiating to the left leg     The lumbar spine is in satisfactory alignment. The vertebral bodies  are of normal height. There is a vertebral body hemangioma within L3.  The intervertebral disc spaces are maintained. The conus medullaris is  normal in appearance and ends at L1.     At T12-L1, L1-2 there is no significant abnormality.     At L2-3 there is a shallow disc bulge asymmetric to the left resulting  in left foraminal narrowing and mild central canal stenosis.     At L3-4 there is broad-based disc bulge asymmetric to the left  with  facet hypertrophy resulting in mild central canal stenosis and  foraminal narrowing, left clinical history right. Correlation with  left L3 radicular symptoms is recommended     At L4-5 there is broad-based disc bulge and facet hypertrophy  resulting in mild central canal stenosis and left lateral recess and  foraminal narrowing. Correlation with left L4 radicular symptoms is  recommended. There is mild right foraminal narrowing.     At L5-S1 there is facet hypertrophy resulting in mild foraminal  narrowing, left greater than right. There is no central canal  stenosis.     The paraspinous soft tissues are normal. There is a 5.3 cm cyst  arising from the upper pole the left kidney.     IMPRESSION: Multilevel degenerative disc disease and facet hypertrophy  resulting in multilevel left foraminal narrowing at L2-3, L3-4 and  L4-5. Correlation with left L3 and L4 radicular symptoms is  recommended due to encroachment in the lateral recess     mild left foraminal narrowing at L5-S1     Electronically Signed by Lidia Charlton M.D. on 1/15/2021 1:25 PM           Assessment:       1. Lumbar radiculitis    2. Lumbar facet arthropathy    3. Disc degeneration, lumbar        Plan:       Paula was seen today for pain.    Diagnoses and all orders for this visit:    Lumbar radiculitis  -     X-Ray Lumbar Spine Ap And Lateral    Lumbar facet arthropathy  -     X-Ray Lumbar Spine Ap And Lateral    Disc degeneration, lumbar  -     X-Ray Lumbar Spine Ap And Lateral         No follow-ups on file.    Treatment options were discussed patient wishes to resume taking her gabapentin.  She will consider referral to pain management should this be unsuccessful  Treatment options were discussed with regards to the nature of the medical condition. Conservative pain intervention and surgical options were discussed in detail. The probability of success of each separate treatment option was discussed. The patient expressed a clear  understanding of the treatment options. With regards to surgery, the procedure risk, benefits, complications, and outcomes were discussed. No guarantees were given with regards to surgical outcome.   The risk of complications, morbidity, and mortality of patient management decisions have been made at the time of this visit. These are associated with the patient's problems, diagnostic procedures and treatment options. This includes the possible management options selected and those considered but not selected by the patient after shared medical decision making we discussed with the patient.   This note was created using Dragon voice recognition software that occasionally misinterpreted phrases or words.

## 2022-09-28 ENCOUNTER — OFFICE VISIT (OUTPATIENT)
Dept: FAMILY MEDICINE | Facility: CLINIC | Age: 62
End: 2022-09-28
Payer: COMMERCIAL

## 2022-09-28 ENCOUNTER — HOSPITAL ENCOUNTER (OUTPATIENT)
Dept: RADIOLOGY | Facility: HOSPITAL | Age: 62
Discharge: HOME | End: 2022-09-28
Attending: NURSE PRACTITIONER
Payer: COMMERCIAL

## 2022-09-28 VITALS — WEIGHT: 189 LBS | BODY MASS INDEX: 33.49 KG/M2 | HEART RATE: 71 BPM | TEMPERATURE: 98 F | HEIGHT: 63 IN

## 2022-09-28 DIAGNOSIS — M54.16 LUMBAR BACK PAIN WITH RADICULOPATHY AFFECTING LEFT LOWER EXTREMITY: Primary | ICD-10-CM

## 2022-09-28 DIAGNOSIS — M48.061 FORAMINAL STENOSIS OF LUMBAR REGION: ICD-10-CM

## 2022-09-28 DIAGNOSIS — F41.9 ANXIETY: ICD-10-CM

## 2022-09-28 DIAGNOSIS — Z78.0 MENOPAUSE: ICD-10-CM

## 2022-09-28 DIAGNOSIS — M47.816 LUMBAR FACET ARTHROPATHY: ICD-10-CM

## 2022-09-28 DIAGNOSIS — M54.16 LUMBAR BACK PAIN WITH RADICULOPATHY AFFECTING LEFT LOWER EXTREMITY: ICD-10-CM

## 2022-09-28 DIAGNOSIS — K21.9 GASTROESOPHAGEAL REFLUX DISEASE, UNSPECIFIED WHETHER ESOPHAGITIS PRESENT: ICD-10-CM

## 2022-09-28 DIAGNOSIS — I10 HYPERTENSION, ESSENTIAL: ICD-10-CM

## 2022-09-28 DIAGNOSIS — M51.36 DISC DEGENERATION, LUMBAR: ICD-10-CM

## 2022-09-28 DIAGNOSIS — J32.9 SINUSITIS, UNSPECIFIED CHRONICITY, UNSPECIFIED LOCATION: ICD-10-CM

## 2022-09-28 DIAGNOSIS — J06.9 UPPER RESPIRATORY TRACT INFECTION, UNSPECIFIED TYPE: ICD-10-CM

## 2022-09-28 PROCEDURE — 90471 FLU VACCINE (QUAD) GREATER THAN OR EQUAL TO 3YO PRESERVATIVE FREE IM: ICD-10-PCS | Mod: S$GLB,,, | Performed by: NURSE PRACTITIONER

## 2022-09-28 PROCEDURE — 90750 ZOSTER RECOMBINANT VACCINE: ICD-10-PCS | Mod: S$GLB,,, | Performed by: NURSE PRACTITIONER

## 2022-09-28 PROCEDURE — 90472 ZOSTER RECOMBINANT VACCINE: ICD-10-PCS | Mod: S$GLB,,, | Performed by: NURSE PRACTITIONER

## 2022-09-28 PROCEDURE — 99214 OFFICE O/P EST MOD 30 MIN: CPT | Mod: 25,S$GLB,, | Performed by: NURSE PRACTITIONER

## 2022-09-28 PROCEDURE — 1159F PR MEDICATION LIST DOCUMENTED IN MEDICAL RECORD: ICD-10-PCS | Mod: CPTII,S$GLB,, | Performed by: NURSE PRACTITIONER

## 2022-09-28 PROCEDURE — 90471 IMMUNIZATION ADMIN: CPT | Mod: S$GLB,,, | Performed by: NURSE PRACTITIONER

## 2022-09-28 PROCEDURE — 3008F BODY MASS INDEX DOCD: CPT | Mod: CPTII,S$GLB,, | Performed by: NURSE PRACTITIONER

## 2022-09-28 PROCEDURE — 1160F RVW MEDS BY RX/DR IN RCRD: CPT | Mod: CPTII,S$GLB,, | Performed by: NURSE PRACTITIONER

## 2022-09-28 PROCEDURE — 90686 FLU VACCINE (QUAD) GREATER THAN OR EQUAL TO 3YO PRESERVATIVE FREE IM: ICD-10-PCS | Mod: S$GLB,,, | Performed by: NURSE PRACTITIONER

## 2022-09-28 PROCEDURE — 90686 IIV4 VACC NO PRSV 0.5 ML IM: CPT | Mod: S$GLB,,, | Performed by: NURSE PRACTITIONER

## 2022-09-28 PROCEDURE — 90472 IMMUNIZATION ADMIN EACH ADD: CPT | Mod: S$GLB,,, | Performed by: NURSE PRACTITIONER

## 2022-09-28 PROCEDURE — 72110 X-RAY EXAM L-2 SPINE 4/>VWS: CPT | Mod: TC

## 2022-09-28 PROCEDURE — 90750 HZV VACC RECOMBINANT IM: CPT | Mod: S$GLB,,, | Performed by: NURSE PRACTITIONER

## 2022-09-28 PROCEDURE — 3008F PR BODY MASS INDEX (BMI) DOCUMENTED: ICD-10-PCS | Mod: CPTII,S$GLB,, | Performed by: NURSE PRACTITIONER

## 2022-09-28 PROCEDURE — 1160F PR REVIEW ALL MEDS BY PRESCRIBER/CLIN PHARMACIST DOCUMENTED: ICD-10-PCS | Mod: CPTII,S$GLB,, | Performed by: NURSE PRACTITIONER

## 2022-09-28 PROCEDURE — 99214 PR OFFICE/OUTPT VISIT, EST, LEVL IV, 30-39 MIN: ICD-10-PCS | Mod: 25,S$GLB,, | Performed by: NURSE PRACTITIONER

## 2022-09-28 PROCEDURE — 1159F MED LIST DOCD IN RCRD: CPT | Mod: CPTII,S$GLB,, | Performed by: NURSE PRACTITIONER

## 2022-09-28 RX ORDER — LEVOCETIRIZINE DIHYDROCHLORIDE 5 MG/1
5 TABLET, FILM COATED ORAL NIGHTLY
Qty: 90 TABLET | Refills: 1 | Status: SHIPPED | OUTPATIENT
Start: 2022-09-28 | End: 2023-05-22 | Stop reason: SDUPTHER

## 2022-09-28 RX ORDER — LANOLIN ALCOHOL/MO/W.PET/CERES
400 CREAM (GRAM) TOPICAL DAILY
Qty: 90 TABLET | Refills: 1 | Status: SHIPPED | OUTPATIENT
Start: 2022-09-28 | End: 2023-05-22 | Stop reason: SDUPTHER

## 2022-09-28 RX ORDER — SPIRONOLACTONE 25 MG/1
25 TABLET ORAL EVERY OTHER DAY
Qty: 45 TABLET | Refills: 1 | Status: SHIPPED | OUTPATIENT
Start: 2022-09-28 | End: 2023-05-22 | Stop reason: SDUPTHER

## 2022-09-28 RX ORDER — ESTRADIOL 1 MG/1
TABLET ORAL
Qty: 90 TABLET | Refills: 1 | Status: SHIPPED | OUTPATIENT
Start: 2022-09-28 | End: 2023-05-22 | Stop reason: SDUPTHER

## 2022-09-28 RX ORDER — ALBUTEROL SULFATE 90 UG/1
2 AEROSOL, METERED RESPIRATORY (INHALATION) EVERY 4 HOURS PRN
Qty: 18 G | Refills: 0 | Status: SHIPPED | OUTPATIENT
Start: 2022-09-28 | End: 2024-03-20

## 2022-09-28 RX ORDER — AZELASTINE 1 MG/ML
1 SPRAY, METERED NASAL 2 TIMES DAILY
Qty: 90 ML | Refills: 1 | Status: SHIPPED | OUTPATIENT
Start: 2022-09-28 | End: 2023-05-22 | Stop reason: SDUPTHER

## 2022-09-28 RX ORDER — PANTOPRAZOLE SODIUM 40 MG/1
40 TABLET, DELAYED RELEASE ORAL DAILY
Qty: 90 TABLET | Refills: 1 | Status: SHIPPED | OUTPATIENT
Start: 2022-09-28 | End: 2023-05-22 | Stop reason: SDUPTHER

## 2022-09-28 RX ORDER — ALPRAZOLAM 0.5 MG/1
0.5 TABLET ORAL NIGHTLY PRN
Qty: 30 TABLET | Refills: 2 | Status: SHIPPED | OUTPATIENT
Start: 2022-09-28 | End: 2023-05-22 | Stop reason: SDUPTHER

## 2022-09-28 RX ORDER — METOPROLOL SUCCINATE 50 MG/1
50 TABLET, EXTENDED RELEASE ORAL DAILY
Qty: 90 TABLET | Refills: 1 | Status: SHIPPED | OUTPATIENT
Start: 2022-09-28 | End: 2023-04-11 | Stop reason: SDUPTHER

## 2022-09-28 RX ORDER — FLUTICASONE PROPIONATE 50 MCG
1 SPRAY, SUSPENSION (ML) NASAL DAILY
Qty: 16 G | Refills: 5 | Status: SHIPPED | OUTPATIENT
Start: 2022-09-28 | End: 2023-05-22 | Stop reason: SDUPTHER

## 2022-09-28 RX ORDER — BACLOFEN 10 MG/1
10 TABLET ORAL 3 TIMES DAILY PRN
Qty: 90 TABLET | Refills: 0 | Status: SHIPPED | OUTPATIENT
Start: 2022-09-28 | End: 2022-10-10

## 2022-09-28 RX ORDER — GABAPENTIN 300 MG/1
300 CAPSULE ORAL NIGHTLY
Qty: 90 CAPSULE | Refills: 1 | Status: SHIPPED | OUTPATIENT
Start: 2022-09-28 | End: 2023-05-22

## 2022-09-28 RX ORDER — PREDNISONE 20 MG/1
20 TABLET ORAL 2 TIMES DAILY
Qty: 10 TABLET | Refills: 0 | Status: SHIPPED | OUTPATIENT
Start: 2022-09-28 | End: 2022-10-10

## 2022-09-28 NOTE — PROGRESS NOTES
SUBJECTIVE:      Patient ID: Paula Hanna is a 62 y.o. female.    Chief Complaint: Back Pain (lower), Hip Pain, and Leg Pain    HPI  Left hip and leg pain radiating  Has some relief when stands up  Vss; bp is well controlled  Denies chest pain  Denies sob  Denies fever  Denies chills  Needs refills also- xanax-  checked; manages anxiety; nonsuicidal  Xyzal albuterol and flonase for allergies works well  Prazole for gerd manages well  Aldactone and toprol for bp - vss; bp is well controlled  Denies chest pain  Denies sob  Denies fever  Denies chills      Past Surgical History:   Procedure Laterality Date    HYSTERECTOMY      Ileocolectomy          KNEE ARTHROSCOPY W/ DEBRIDEMENT      x 2, right    LAPAROSCOPIC CHOLECYSTECTOMY N/A 2021    Procedure: CHOLECYSTECTOMY, LAPAROSCOPIC;  Surgeon: Pepe Cool MD;  Location: [facility];  Service: General;  Laterality: N/A;    LUMBAR EPIDURAL INJECTION      TONSILLECTOMY      WRIST SURGERY Right     plate     Family History   Problem Relation Age of Onset    Hypertension Father     Cancer Father     Hypertension Mother     Heart disease Mother     Diabetes Brother     Hypertension Brother     Prostate cancer Brother     Diabetes Sister     Hypertension Sister     Ovarian cancer Neg Hx     Breast cancer Neg Hx       Social History     Socioeconomic History    Marital status:    Tobacco Use    Smoking status: Former     Packs/day: 0.25     Years: 22.00     Pack years: 5.50     Types: Cigarettes     Quit date: 3/1/2017     Years since quittin.5    Smokeless tobacco: Never    Tobacco comments:     less than 1 pack week   Substance and Sexual Activity    Alcohol use: Yes     Comment: socially    Drug use: No    Sexual activity: Yes     Partners: Male     Birth control/protection: None, Surgical     Current Outpatient Medications   Medication Sig Dispense Refill    aspirin (ECOTRIN) 81 MG EC tablet Take 81 mg by  mouth once daily.      ibuprofen (ADVIL,MOTRIN) 800 MG tablet TAKE 1 TABLET( 800 MG TOTAL) BY MOUTH EVERY 8 HOURS( 3 TIMES DAILY) AS NEEDED FOR PAIN 30 tablet 2    polyethylene glycol (GLYCOLAX) 17 gram/dose powder Take 17 g by mouth once daily.   0    albuterol (PROVENTIL HFA) 90 mcg/actuation inhaler Inhale 2 puffs into the lungs every 4 (four) hours as needed for Wheezing. Rescue 18 g 0    ALPRAZolam (XANAX) 0.5 MG tablet Take 1 tablet (0.5 mg total) by mouth nightly as needed for Anxiety. 30 tablet 2    azelastine (ASTELIN) 137 mcg (0.1 %) nasal spray 1 spray (137 mcg total) by Nasal route 2 (two) times daily. 90 mL 1    baclofen (LIORESAL) 10 MG tablet Take 1 tablet (10 mg total) by mouth 3 (three) times daily as needed (back spasm). 90 tablet 0    estradioL (ESTRACE) 1 MG tablet TAKE 1 TABLET(1 MG) BY MOUTH EVERY DAY 90 tablet 1    fluticasone propionate (FLONASE) 50 mcg/actuation nasal spray 1 spray (50 mcg total) by Each Nostril route once daily. 16 g 5    gabapentin (NEURONTIN) 300 MG capsule Take 1 capsule (300 mg total) by mouth every evening. 90 capsule 1    levocetirizine (XYZAL) 5 MG tablet Take 1 tablet (5 mg total) by mouth every evening. 90 tablet 1    magnesium oxide (MAG-OX) 400 mg (241.3 mg magnesium) tablet Take 1 tablet (400 mg total) by mouth once daily. 90 tablet 1    metoprolol succinate (TOPROL-XL) 50 MG 24 hr tablet Take 1 tablet (50 mg total) by mouth once daily. 90 tablet 1    pantoprazole (PROTONIX) 40 MG tablet Take 1 tablet (40 mg total) by mouth once daily. 90 tablet 1    predniSONE (DELTASONE) 20 MG tablet Take 1 tablet (20 mg total) by mouth 2 (two) times daily. 10 tablet 0    spironolactone (ALDACTONE) 25 MG tablet Take 1 tablet (25 mg total) by mouth every other day. 45 tablet 1     No current facility-administered medications for this visit.     Review of patient's allergies indicates:   Allergen Reactions    Statins-hmg-coa reductase inhibitors Other (See  "Comments)     Muscle and joint pain      Milk containing products     Naproxen Itching    Penicillins Itching     Pt states can take Keflex    Pineapple     Sulfa (sulfonamide antibiotics) Itching      Past Medical History:   Diagnosis Date    Anxiety     Back pain     Degenerative disc disease     GERD (gastroesophageal reflux disease)     Heel spur     Hypertension     Hypertrophy of both inferior nasal turbinates     Nasal septal deviation     Psoriasis     Sciatica      Past Surgical History:   Procedure Laterality Date    HYSTERECTOMY  1999    Ileocolectomy  2000        KNEE ARTHROSCOPY W/ DEBRIDEMENT      x 2, right    LAPAROSCOPIC CHOLECYSTECTOMY N/A 5/16/2021    Procedure: CHOLECYSTECTOMY, LAPAROSCOPIC;  Surgeon: Pepe Cool MD;  [facility];  Service: General;  Laterality: N/A;    LUMBAR EPIDURAL INJECTION      TONSILLECTOMY      WRIST SURGERY Right     plate       Review of Systems   Musculoskeletal:  Positive for back pain.   All other systems reviewed and are negative.   OBJECTIVE:      Vitals:    09/28/22 0945   Pulse: 71   Temp: 97.7 °F (36.5 °C)   TempSrc: Temporal   Weight: 85.7 kg (189 lb)   Height: 5' 3" (1.6 m)     Physical Exam  Vitals and nursing note reviewed.   Constitutional:       Appearance: Normal appearance. She is obese.   HENT:      Head: Normocephalic and atraumatic.   Eyes:      Pupils: Pupils are equal, round, and reactive to light.   Neck:      Comments: No thrills no bruits  No abnormal neck masses felt  Cardiovascular:      Rate and Rhythm: Normal rate and regular rhythm.      Pulses: Normal pulses.      Heart sounds: Normal heart sounds.      Comments: S1 s2 nsr  No edema noted on bilateral lower ext  Pulmonary:      Effort: Pulmonary effort is normal.      Breath sounds: Normal breath sounds.   Musculoskeletal:      Cervical back: Normal range of motion.      Comments: Bilateral positive SLE noted  Some relief when standing  Radiates down " left leg  L4-L5   Skin:     General: Skin is warm and dry.   Neurological:      General: No focal deficit present.      Mental Status: She is alert and oriented to person, place, and time. Mental status is at baseline.   Psychiatric:         Mood and Affect: Mood normal.         Behavior: Behavior normal.         Thought Content: Thought content normal.         Judgment: Judgment normal.      Comments: nonsuicidal      Assessment:       1. Lumbar back pain with radiculopathy affecting left lower extremity    2. Foraminal stenosis of lumbar region    3. Lumbar facet arthropathy    4. Disc degeneration, lumbar    5. Upper respiratory tract infection, unspecified type    6. Hypertension, essential    7. Gastroesophageal reflux disease, unspecified whether esophagitis present    8. Sinusitis, unspecified chronicity, unspecified location    9. Menopause    10. Anxiety        Plan:       Lumbar back pain with radiculopathy affecting left lower extremity  -     X-Ray Lumbar Spine 5 View; Future; Expected date: 09/28/2022  -     Ambulatory referral/consult to Physical Medicine Rehab; Future; Expected date: 09/28/2022  -     baclofen (LIORESAL) 10 MG tablet; Take 1 tablet (10 mg total) by mouth 3 (three) times daily as needed (back spasm).  Dispense: 90 tablet; Refill: 0  -     predniSONE (DELTASONE) 20 MG tablet; Take 1 tablet (20 mg total) by mouth 2 (two) times daily.  Dispense: 10 tablet; Refill: 0    Foraminal stenosis of lumbar region  -     gabapentin (NEURONTIN) 300 MG capsule; Take 1 capsule (300 mg total) by mouth every evening.  Dispense: 90 capsule; Refill: 1    Lumbar facet arthropathy  -     gabapentin (NEURONTIN) 300 MG capsule; Take 1 capsule (300 mg total) by mouth every evening.  Dispense: 90 capsule; Refill: 1    Disc degeneration, lumbar  -     magnesium oxide (MAG-OX) 400 mg (241.3 mg magnesium) tablet; Take 1 tablet (400 mg total) by mouth once daily.  Dispense: 90 tablet; Refill: 1  -     gabapentin  (NEURONTIN) 300 MG capsule; Take 1 capsule (300 mg total) by mouth every evening.  Dispense: 90 capsule; Refill: 1    Upper respiratory tract infection, unspecified type  -     levocetirizine (XYZAL) 5 MG tablet; Take 1 tablet (5 mg total) by mouth every evening.  Dispense: 90 tablet; Refill: 1  -     albuterol (PROVENTIL HFA) 90 mcg/actuation inhaler; Inhale 2 puffs into the lungs every 4 (four) hours as needed for Wheezing. Rescue  Dispense: 18 g; Refill: 0    Hypertension, essential  -     spironolactone (ALDACTONE) 25 MG tablet; Take 1 tablet (25 mg total) by mouth every other day.  Dispense: 45 tablet; Refill: 1  -     metoprolol succinate (TOPROL-XL) 50 MG 24 hr tablet; Take 1 tablet (50 mg total) by mouth once daily.  Dispense: 90 tablet; Refill: 1    Gastroesophageal reflux disease, unspecified whether esophagitis present  -     pantoprazole (PROTONIX) 40 MG tablet; Take 1 tablet (40 mg total) by mouth once daily.  Dispense: 90 tablet; Refill: 1    Sinusitis, unspecified chronicity, unspecified location  -     fluticasone propionate (FLONASE) 50 mcg/actuation nasal spray; 1 spray (50 mcg total) by Each Nostril route once daily.  Dispense: 16 g; Refill: 5    Menopause  -     estradioL (ESTRACE) 1 MG tablet; TAKE 1 TABLET(1 MG) BY MOUTH EVERY DAY  Dispense: 90 tablet; Refill: 1  -     azelastine (ASTELIN) 137 mcg (0.1 %) nasal spray; 1 spray (137 mcg total) by Nasal route 2 (two) times daily.  Dispense: 90 mL; Refill: 1    Anxiety  -     ALPRAZolam (XANAX) 0.5 MG tablet; Take 1 tablet (0.5 mg total) by mouth nightly as needed for Anxiety.  Dispense: 30 tablet; Refill: 2    Other orders  -     Influenza - Quadrivalent *Preferred* (6 months+) (PF)  -     Zoster Recombinant Vaccine    Take medications as ordered  Get x rays done  See dr jones  Follow up in 3 months or sooner if needed    No follow-ups on file.      9/28/2022 LETICIA Bee, FNP

## 2022-10-04 ENCOUNTER — TELEPHONE (OUTPATIENT)
Dept: PHYSICAL MEDICINE AND REHAB | Facility: CLINIC | Age: 62
End: 2022-10-04
Payer: COMMERCIAL

## 2022-10-04 NOTE — TELEPHONE ENCOUNTER
Called pt to see why pt is coming in to see Dr. Jacome. Pt stated. Pt stated she is coming for back pain and sciatica. I informed pt that Dr. Jacome does not do back pain. I informed her of a provider that is in [place] where she lives who specializes in back and spine. I offered her an appointment with him and pt stated she would take the appointment. All questions answered

## 2022-10-07 ENCOUNTER — TELEPHONE (OUTPATIENT)
Dept: FAMILY MEDICINE | Facility: CLINIC | Age: 62
End: 2022-10-07
Payer: COMMERCIAL

## 2022-10-07 NOTE — TELEPHONE ENCOUNTER
----- Message from Estefany Serrano sent at 10/7/2022  2:58 PM CDT -----  Type:  Same Day Appointment Request  Caller is requesting a same day appointment.  Caller declined first available appointment listed below.    Name of Caller:  Pt   When is the first available appointment? 10/26/22  Symptoms:  b/p concerns  reading 198/84, lightheaded  Best Call Back Number:  [phone]  Additional Information:   Pt requesting a call back for same day appt.Pt denied first available for 10/26/22.

## 2022-10-10 ENCOUNTER — LAB VISIT (OUTPATIENT)
Dept: LAB | Facility: HOSPITAL | Age: 62
End: 2022-10-10
Attending: FAMILY MEDICINE
Payer: COMMERCIAL

## 2022-10-10 ENCOUNTER — OFFICE VISIT (OUTPATIENT)
Dept: FAMILY MEDICINE | Facility: CLINIC | Age: 62
End: 2022-10-10
Payer: COMMERCIAL

## 2022-10-10 VITALS
BODY MASS INDEX: 33.38 KG/M2 | OXYGEN SATURATION: 99 % | DIASTOLIC BLOOD PRESSURE: 72 MMHG | SYSTOLIC BLOOD PRESSURE: 136 MMHG | HEART RATE: 68 BPM | WEIGHT: 188.38 LBS | HEIGHT: 63 IN | TEMPERATURE: 97 F

## 2022-10-10 DIAGNOSIS — I10 HYPERTENSION, ESSENTIAL: Primary | ICD-10-CM

## 2022-10-10 DIAGNOSIS — R07.9 CHEST PAIN, UNSPECIFIED TYPE: ICD-10-CM

## 2022-10-10 DIAGNOSIS — Z79.82 ASPIRIN LONG-TERM USE: ICD-10-CM

## 2022-10-10 DIAGNOSIS — R30.0 DYSURIA: ICD-10-CM

## 2022-10-10 DIAGNOSIS — I10 HYPERTENSION, ESSENTIAL: ICD-10-CM

## 2022-10-10 LAB
BILIRUB SERPL-MCNC: NORMAL MG/DL
BLOOD URINE, POC: NORMAL
CK MB SERPL-MCNC: 1.7 NG/ML (ref 0.1–6.5)
CLARITY, POC UA: CLEAR
COLOR, POC UA: YELLOW
GLUCOSE UR QL STRIP: NORMAL
KETONES UR QL STRIP: NORMAL
LEUKOCYTE ESTERASE URINE, POC: NORMAL
NITRITE, POC UA: NORMAL
PH, POC UA: 5
PROTEIN, POC: NORMAL
SPECIFIC GRAVITY, POC UA: 1.02
TROPONIN I SERPL DL<=0.01 NG/ML-MCNC: <0.03 NG/ML
UROBILINOGEN, POC UA: NORMAL

## 2022-10-10 PROCEDURE — 3075F SYST BP GE 130 - 139MM HG: CPT | Mod: CPTII,S$GLB,, | Performed by: FAMILY MEDICINE

## 2022-10-10 PROCEDURE — 1159F PR MEDICATION LIST DOCUMENTED IN MEDICAL RECORD: ICD-10-PCS | Mod: CPTII,S$GLB,, | Performed by: FAMILY MEDICINE

## 2022-10-10 PROCEDURE — 99214 OFFICE O/P EST MOD 30 MIN: CPT | Mod: S$GLB,,, | Performed by: FAMILY MEDICINE

## 2022-10-10 PROCEDURE — 1160F RVW MEDS BY RX/DR IN RCRD: CPT | Mod: CPTII,S$GLB,, | Performed by: FAMILY MEDICINE

## 2022-10-10 PROCEDURE — 93010 ELECTROCARDIOGRAM REPORT: CPT | Mod: S$GLB,,, | Performed by: INTERNAL MEDICINE

## 2022-10-10 PROCEDURE — 3008F PR BODY MASS INDEX (BMI) DOCUMENTED: ICD-10-PCS | Mod: CPTII,S$GLB,, | Performed by: FAMILY MEDICINE

## 2022-10-10 PROCEDURE — 93005 ELECTROCARDIOGRAM TRACING: CPT | Mod: S$GLB,,, | Performed by: FAMILY MEDICINE

## 2022-10-10 PROCEDURE — 3075F PR MOST RECENT SYSTOLIC BLOOD PRESS GE 130-139MM HG: ICD-10-PCS | Mod: CPTII,S$GLB,, | Performed by: FAMILY MEDICINE

## 2022-10-10 PROCEDURE — 99214 PR OFFICE/OUTPT VISIT, EST, LEVL IV, 30-39 MIN: ICD-10-PCS | Mod: S$GLB,,, | Performed by: FAMILY MEDICINE

## 2022-10-10 PROCEDURE — 3008F BODY MASS INDEX DOCD: CPT | Mod: CPTII,S$GLB,, | Performed by: FAMILY MEDICINE

## 2022-10-10 PROCEDURE — 82553 CREATINE MB FRACTION: CPT | Performed by: FAMILY MEDICINE

## 2022-10-10 PROCEDURE — 84484 ASSAY OF TROPONIN QUANT: CPT | Performed by: FAMILY MEDICINE

## 2022-10-10 PROCEDURE — 3078F PR MOST RECENT DIASTOLIC BLOOD PRESSURE < 80 MM HG: ICD-10-PCS | Mod: CPTII,S$GLB,, | Performed by: FAMILY MEDICINE

## 2022-10-10 PROCEDURE — 3078F DIAST BP <80 MM HG: CPT | Mod: CPTII,S$GLB,, | Performed by: FAMILY MEDICINE

## 2022-10-10 PROCEDURE — 36415 COLL VENOUS BLD VENIPUNCTURE: CPT | Performed by: FAMILY MEDICINE

## 2022-10-10 PROCEDURE — 93005 EKG 12-LEAD: ICD-10-PCS | Mod: S$GLB,,, | Performed by: FAMILY MEDICINE

## 2022-10-10 PROCEDURE — 81002 URINALYSIS NONAUTO W/O SCOPE: CPT | Mod: S$GLB,,, | Performed by: FAMILY MEDICINE

## 2022-10-10 PROCEDURE — 81002 POCT URINE DIPSTICK WITHOUT MICROSCOPE: ICD-10-PCS | Mod: S$GLB,,, | Performed by: FAMILY MEDICINE

## 2022-10-10 PROCEDURE — 93010 EKG 12-LEAD: ICD-10-PCS | Mod: S$GLB,,, | Performed by: INTERNAL MEDICINE

## 2022-10-10 PROCEDURE — 1159F MED LIST DOCD IN RCRD: CPT | Mod: CPTII,S$GLB,, | Performed by: FAMILY MEDICINE

## 2022-10-10 PROCEDURE — 1160F PR REVIEW ALL MEDS BY PRESCRIBER/CLIN PHARMACIST DOCUMENTED: ICD-10-PCS | Mod: CPTII,S$GLB,, | Performed by: FAMILY MEDICINE

## 2022-10-10 NOTE — PROGRESS NOTES
Subjective:       Patient ID: Paula Hanna is a 62 y.o. female.    Chief Complaint: Dizziness    Patient presents to clinic for medication side effects. Given Baclofen by Lyudmila Pressley for sciatic pain and says she felt lightheaded, pressure and tightness in her chest, and her blood pressure was elevated at 195/74 and 174/84. Took an extra half of Metoprolol for 2 days and blood pressure stabilized. She discontinued Baclofen Thursday and symptoms resolved. Blood pressure is normal today. Cardiovascular ok. Denies chest pain or palpitations. Also complaining of dysuria.   Review of Systems   Respiratory: Negative.     Cardiovascular: Negative.  Negative for chest pain and palpitations.   Genitourinary:  Positive for dysuria.   Psychiatric/Behavioral: Negative.         Objective:      Physical Exam  Constitutional:       Appearance: Normal appearance.   Neck:      Vascular: No carotid bruit.   Cardiovascular:      Rate and Rhythm: Normal rate and regular rhythm.      Pulses: Normal pulses.      Heart sounds: Normal heart sounds.   Pulmonary:      Effort: Pulmonary effort is normal.      Breath sounds: Normal breath sounds.   Lymphadenopathy:      Cervical: No cervical adenopathy.   Skin:     General: Skin is warm and dry.   Neurological:      Mental Status: She is alert.   Psychiatric:         Mood and Affect: Mood normal.         Behavior: Behavior normal.       Assessment/Plan:     Hypertension, essential  -     Cancel: CK; Future; Expected date: 10/24/2022  -     CK-MB; Future; Expected date: 10/10/2022  -     Troponin I; Future; Expected date: 10/10/2022  -     IN OFFICE EKG 12-LEAD (to [facility])  -     Stress Echo Which stress agent will be used? Treadmill Exercise; Color Flow Doppler? No; Future    Dysuria  -     POCT URINE DIPSTICK WITHOUT MICROSCOPE    Aspirin long-term use     Urinalysis normal.  Will send urine culture.  EKG done today.  Normal.  Will check CPK MB and troponin.  Will also order stress echo  if these are negative.  No further baclofen.  Possible reaction to it.

## 2022-10-12 ENCOUNTER — OFFICE VISIT (OUTPATIENT)
Dept: ORTHOPEDICS | Facility: CLINIC | Age: 62
End: 2022-10-12
Payer: COMMERCIAL

## 2022-10-12 VITALS
DIASTOLIC BLOOD PRESSURE: 78 MMHG | HEIGHT: 63 IN | BODY MASS INDEX: 33.31 KG/M2 | SYSTOLIC BLOOD PRESSURE: 126 MMHG | WEIGHT: 188 LBS

## 2022-10-12 DIAGNOSIS — M47.816 LUMBAR FACET ARTHROPATHY: ICD-10-CM

## 2022-10-12 DIAGNOSIS — M54.16 LUMBAR RADICULITIS: ICD-10-CM

## 2022-10-12 DIAGNOSIS — M51.36 DISC DEGENERATION, LUMBAR: ICD-10-CM

## 2022-10-12 DIAGNOSIS — M48.061 FORAMINAL STENOSIS OF LUMBAR REGION: Primary | ICD-10-CM

## 2022-10-12 PROCEDURE — 3078F PR MOST RECENT DIASTOLIC BLOOD PRESSURE < 80 MM HG: ICD-10-PCS | Mod: CPTII,S$GLB,, | Performed by: ORTHOPAEDIC SURGERY

## 2022-10-12 PROCEDURE — 1159F PR MEDICATION LIST DOCUMENTED IN MEDICAL RECORD: ICD-10-PCS | Mod: CPTII,S$GLB,, | Performed by: ORTHOPAEDIC SURGERY

## 2022-10-12 PROCEDURE — 99213 PR OFFICE/OUTPT VISIT, EST, LEVL III, 20-29 MIN: ICD-10-PCS | Mod: S$GLB,,, | Performed by: ORTHOPAEDIC SURGERY

## 2022-10-12 PROCEDURE — 1160F RVW MEDS BY RX/DR IN RCRD: CPT | Mod: CPTII,S$GLB,, | Performed by: ORTHOPAEDIC SURGERY

## 2022-10-12 PROCEDURE — 1159F MED LIST DOCD IN RCRD: CPT | Mod: CPTII,S$GLB,, | Performed by: ORTHOPAEDIC SURGERY

## 2022-10-12 PROCEDURE — 1160F PR REVIEW ALL MEDS BY PRESCRIBER/CLIN PHARMACIST DOCUMENTED: ICD-10-PCS | Mod: CPTII,S$GLB,, | Performed by: ORTHOPAEDIC SURGERY

## 2022-10-12 PROCEDURE — 99213 OFFICE O/P EST LOW 20 MIN: CPT | Mod: S$GLB,,, | Performed by: ORTHOPAEDIC SURGERY

## 2022-10-12 PROCEDURE — 3074F PR MOST RECENT SYSTOLIC BLOOD PRESSURE < 130 MM HG: ICD-10-PCS | Mod: CPTII,S$GLB,, | Performed by: ORTHOPAEDIC SURGERY

## 2022-10-12 PROCEDURE — 3008F PR BODY MASS INDEX (BMI) DOCUMENTED: ICD-10-PCS | Mod: CPTII,S$GLB,, | Performed by: ORTHOPAEDIC SURGERY

## 2022-10-12 PROCEDURE — 3008F BODY MASS INDEX DOCD: CPT | Mod: CPTII,S$GLB,, | Performed by: ORTHOPAEDIC SURGERY

## 2022-10-12 PROCEDURE — 3078F DIAST BP <80 MM HG: CPT | Mod: CPTII,S$GLB,, | Performed by: ORTHOPAEDIC SURGERY

## 2022-10-12 PROCEDURE — 3074F SYST BP LT 130 MM HG: CPT | Mod: CPTII,S$GLB,, | Performed by: ORTHOPAEDIC SURGERY

## 2022-10-12 NOTE — PROGRESS NOTES
Subjective:       Patient ID: Paula Hanna is a 62 y.o. female.    Chief Complaint: Pain of the Lumbar Spine (Lumbar f/u, seen in July. increased pain about 2 weeks ago. Saw PCP and was given Baclofen/Gabapentin but had adverse reactions so she stopped taking it. Intermittent leg pain to about thigh level. PCP also placed ref to Dr Michelle)      History of Present Illness    Prior to meeting with the patient I reviewed the medical chart in [facility]. This included reviewing the previous progress notes from our office, review of the patient's last appointment with their primary care provider, review of any visits to the emergency room, and review of any pain management appointments or procedures.   Patient is here follow-up for left lower extremity radiculitis/sciatica.  Recently seen by her primary care physician and was prescribed baclofen which cause a significant side effect.  Eventually she discontinue the baclofen and gabapentin because of the side effects.  But she still has the left lower extremity radicular symptoms.  It is noted that the gabapentin was helping her lower extremity symptoms.  She has since been referred to Dr. Michelle.    Current Medications  Current Outpatient Medications   Medication Sig Dispense Refill    albuterol (PROVENTIL HFA) 90 mcg/actuation inhaler Inhale 2 puffs into the lungs every 4 (four) hours as needed for Wheezing. Rescue 18 g 0    ALPRAZolam (XANAX) 0.5 MG tablet Take 1 tablet (0.5 mg total) by mouth nightly as needed for Anxiety. 30 tablet 2    aspirin (ECOTRIN) 81 MG EC tablet Take 81 mg by mouth once daily.      azelastine (ASTELIN) 137 mcg (0.1 %) nasal spray 1 spray (137 mcg total) by Nasal route 2 (two) times daily. 90 mL 1    estradioL (ESTRACE) 1 MG tablet TAKE 1 TABLET(1 MG) BY MOUTH EVERY DAY 90 tablet 1    fluticasone propionate (FLONASE) 50 mcg/actuation nasal spray 1 spray (50 mcg total) by Each Nostril route once daily. 16 g 5    ibuprofen (ADVIL,MOTRIN) 800 MG  tablet TAKE 1 TABLET( 800 MG TOTAL) BY MOUTH EVERY 8 HOURS( 3 TIMES DAILY) AS NEEDED FOR PAIN 30 tablet 2    levocetirizine (XYZAL) 5 MG tablet Take 1 tablet (5 mg total) by mouth every evening. 90 tablet 1    magnesium oxide (MAG-OX) 400 mg (241.3 mg magnesium) tablet Take 1 tablet (400 mg total) by mouth once daily. 90 tablet 1    metoprolol succinate (TOPROL-XL) 50 MG 24 hr tablet Take 1 tablet (50 mg total) by mouth once daily. 90 tablet 1    pantoprazole (PROTONIX) 40 MG tablet Take 1 tablet (40 mg total) by mouth once daily. 90 tablet 1    polyethylene glycol (GLYCOLAX) 17 gram/dose powder Take 17 g by mouth once daily.   0    spironolactone (ALDACTONE) 25 MG tablet Take 1 tablet (25 mg total) by mouth every other day. 45 tablet 1    gabapentin (NEURONTIN) 300 MG capsule Take 1 capsule (300 mg total) by mouth every evening. (Patient not taking: Reported on 10/12/2022) 90 capsule 1     No current facility-administered medications for this visit.       Allergies  Review of patient's allergies indicates:   Allergen Reactions    Statins-hmg-coa reductase inhibitors Other (See Comments)     Muscle and joint pain      Milk containing products     Naproxen Itching    Penicillins Itching     Pt states can take Keflex    Sulfa (sulfonamide antibiotics) Itching       Past Medical History  Past Medical History:   Diagnosis Date    Anxiety     Back pain     Degenerative disc disease     GERD (gastroesophageal reflux disease)     Heel spur     Hypertension     Hypertrophy of both inferior nasal turbinates     Nasal septal deviation     Psoriasis     Sciatica        Surgical History  Past Surgical History:   Procedure Laterality Date    HYSTERECTOMY  1999    Ileocolectomy  2000        KNEE ARTHROSCOPY W/ DEBRIDEMENT      x 2, right    LAPAROSCOPIC CHOLECYSTECTOMY N/A 5/16/2021    Procedure: CHOLECYSTECTOMY, LAPAROSCOPIC;  Surgeon: Pepe Cool MD;  Location: [facility];  Service: General;  Laterality: N/A;    LUMBAR  EPIDURAL INJECTION      TONSILLECTOMY      WRIST SURGERY Right     plate       Family History:   Family History   Problem Relation Age of Onset    Hypertension Father     Cancer Father     Hypertension Mother     Heart disease Mother     Diabetes Brother     Hypertension Brother     Prostate cancer Brother     Diabetes Sister     Hypertension Sister     Ovarian cancer Neg Hx     Breast cancer Neg Hx        Social History:   Social History     Socioeconomic History    Marital status:    Tobacco Use    Smoking status: Former     Packs/day: 0.25     Years: 22.00     Pack years: 5.50     Types: Cigarettes     Quit date: 3/1/2017     Years since quittin.6    Smokeless tobacco: Never    Tobacco comments:     less than 1 pack week   Substance and Sexual Activity    Alcohol use: Yes     Comment: socially    Drug use: No    Sexual activity: Yes     Partners: Male     Birth control/protection: None, Surgical       Hospitalization/Major Diagnostic Procedure:     Review of Systems     General/Constitutional:  Chills denies. Fatigue denies. Fever denies. Weight gain denies. Weight loss denies.    Respiratory:  Shortness of breath denies.    Cardiovascular:  Chest pain denies.    Gastrointestinal:  Constipation denies. Diarrhea denies. Nausea denies. Vomiting denies.     Hematology:  Easy bruising denies. Prolonged bleeding denies.     Genitourinary:  Frequent urination denies. Pain in lower back denies. Painful urination denies.     Musculoskeletal:  See HPI for details    Skin:  Rash denies.    Neurologic:  Dizziness denies. Gait abnormalities denies. Seizures denies. Tingling/Numbess denies.    Psychiatric:  Anxiety denies. Depressed mood denies.     Objective:   Vital Signs:   Vitals:    10/12/22 1008   BP: 126/78        Physical Exam      General Examination:     Constitutional: The patient is alert and oriented to lace person and time. Mood is pleasant.     Head/Face: Normal facial features normal  eyebrows    Eyes: Normal extraocular motion bilaterally    Lungs: Respirations are equal and unlabored    Gait is coordinated.    Cardiovascular: There are no swelling or varicosities present.    Lymphatic: Negative for adenopathy    Skin: Normal    Neurological: Level of consciousness normal. Oriented to place person and time and situation    Psychiatric: Oriented to time place person and situation    Lumbar exam demonstrates the patient is overweight.  She has a mild antalgic gait.  Lower extremities are distal neurovascular intact.  Negative straight leg raise  Maneuver on the left.    XRAY Report/ Interpretation:  No new studies but previous lumbar MRI demonstrates degenerative disc disease and facet arthropathy causing foraminal stenosis on the left at L2-3, L3-4, and L4-5.      Assessment:       1. Foraminal stenosis of lumbar region    2. Lumbar facet arthropathy    3. Disc degeneration, lumbar    4. Lumbar radiculitis        Plan:       Paula was seen today for pain.    Diagnoses and all orders for this visit:    Foraminal stenosis of lumbar region    Lumbar facet arthropathy    Disc degeneration, lumbar    Lumbar radiculitis       No follow-ups on file.  This is to attest that the physician's assistant [place] served in the capacity as a scribe for this patient encounter.  This is also to verify that I have reviewed the patient's history and helped formulate the treatment plan and discussed it with the physician's assistant .  I have actively participated and evaluation and treatment plan for this patient visit.  The treatment plan and medical decision-making is outlined below:  Proceed with referral and evaluation by Dr. Michelle for left L2-3 and L3-4 transforaminal BRINDA.  Follow-up with us in mid December 2022.    Treatment options were discussed with regards to the nature of the medical condition. Conservative pain intervention and surgical options were discussed in detail. The probability of  success of each separate treatment option was discussed. The patient expressed a clear understanding of the treatment options. With regards to surgery, the procedure risk, benefits, complications, and outcomes were discussed. No guarantees were given with regards to surgical outcome.   The risk of complications, morbidity, and mortality of patient management decisions have been made at the time of this visit. These are associated with the patient's problems, diagnostic procedures and treatment options. This includes the possible management options selected and those considered but not selected by the patient after shared medical decision making we discussed with the patient.     This note was created using Dragon voice recognition software that occasionally misinterpreted phrases or words.

## 2022-10-27 ENCOUNTER — TELEPHONE (OUTPATIENT)
Dept: CARDIOLOGY | Facility: HOSPITAL | Age: 62
End: 2022-10-27

## 2022-10-27 ENCOUNTER — TELEPHONE (OUTPATIENT)
Dept: PAIN MEDICINE | Facility: CLINIC | Age: 62
End: 2022-10-27
Payer: COMMERCIAL

## 2022-10-27 ENCOUNTER — OFFICE VISIT (OUTPATIENT)
Dept: SPINE | Facility: CLINIC | Age: 62
End: 2022-10-27
Payer: COMMERCIAL

## 2022-10-27 VITALS — BODY MASS INDEX: 33.32 KG/M2 | WEIGHT: 188.06 LBS | HEIGHT: 63 IN

## 2022-10-27 DIAGNOSIS — M54.16 LUMBAR RADICULOPATHY, ACUTE: Primary | ICD-10-CM

## 2022-10-27 DIAGNOSIS — M54.42 LOW BACK PAIN WITH LEFT-SIDED SCIATICA, UNSPECIFIED BACK PAIN LATERALITY, UNSPECIFIED CHRONICITY: Primary | ICD-10-CM

## 2022-10-27 PROCEDURE — 1160F RVW MEDS BY RX/DR IN RCRD: CPT | Mod: CPTII,S$GLB,, | Performed by: PHYSICAL MEDICINE & REHABILITATION

## 2022-10-27 PROCEDURE — 1160F PR REVIEW ALL MEDS BY PRESCRIBER/CLIN PHARMACIST DOCUMENTED: ICD-10-PCS | Mod: CPTII,S$GLB,, | Performed by: PHYSICAL MEDICINE & REHABILITATION

## 2022-10-27 PROCEDURE — 99204 PR OFFICE/OUTPT VISIT, NEW, LEVL IV, 45-59 MIN: ICD-10-PCS | Mod: S$GLB,,, | Performed by: PHYSICAL MEDICINE & REHABILITATION

## 2022-10-27 PROCEDURE — 1159F MED LIST DOCD IN RCRD: CPT | Mod: CPTII,S$GLB,, | Performed by: PHYSICAL MEDICINE & REHABILITATION

## 2022-10-27 PROCEDURE — 1159F PR MEDICATION LIST DOCUMENTED IN MEDICAL RECORD: ICD-10-PCS | Mod: CPTII,S$GLB,, | Performed by: PHYSICAL MEDICINE & REHABILITATION

## 2022-10-27 PROCEDURE — 99204 OFFICE O/P NEW MOD 45 MIN: CPT | Mod: S$GLB,,, | Performed by: PHYSICAL MEDICINE & REHABILITATION

## 2022-10-27 NOTE — PROGRESS NOTES
SUBJECTIVE:    Patient ID: Paula Hanna is a 62 y.o. female.    Chief Complaint: Low-back Pain    This is a 62-year-old woman who sees Dr. Cisneros for her primary care.  Other than hypertension she denies any chronic major medical problems.  No cancer history.  She has struggled with low back and left leg discomfort for years.  Her discomfort has gotten worse over the past few months.  There was no injury.  Complains of low back pain at the lumbosacral junction with radiating discomfort into the left anterior thigh to the knee.  No bowel or bladder dysfunction fever chills sweats or unexpected weight loss.  She says she had epidural steroid injections in the remote past which helped.  She has not done any physical therapy.  Her current pain level is 5/10.  She recently saw Dr. Zach Abdul, orthopedic spine surgeon who recommended transforaminal injections on left at L2-3 and L3-4.  I reviewed an MRI of the lumbar spine done 01/15/2021 which is summarized below:    The lumbar spine is in satisfactory alignment. The vertebral bodies  are of normal height. There is a vertebral body hemangioma within L3.  The intervertebral disc spaces are maintained. The conus medullaris is  normal in appearance and ends at L1.     At T12-L1, L1-2 there is no significant abnormality.     At L2-3 there is a shallow disc bulge asymmetric to the left resulting  in left foraminal narrowing and mild central canal stenosis.     At L3-4 there is broad-based disc bulge asymmetric to the left with  facet hypertrophy resulting in mild central canal stenosis and  foraminal narrowing, left clinical history right. Correlation with  left L3 radicular symptoms is recommended     At L4-5 there is broad-based disc bulge and facet hypertrophy  resulting in mild central canal stenosis and left lateral recess and  foraminal narrowing. Correlation with left L4 radicular symptoms is  recommended. There is mild right foraminal narrowing.     At L5-S1  there is facet hypertrophy resulting in mild foraminal  narrowing, left greater than right. There is no central canal  stenosis.     The paraspinous soft tissues are normal. There is a 5.3 cm cyst  arising from the upper pole the left kidney.     IMPRESSION: Multilevel degenerative disc disease and facet hypertrophy  resulting in multilevel left foraminal narrowing at L2-3, L3-4 and  L4-5. Correlation with left L3 and L4 radicular symptoms is  recommended due to encroachment in the lateral recess     mild left foraminal narrowing at L5-S1        Past Medical History:   Diagnosis Date    Anxiety     Back pain     Degenerative disc disease     GERD (gastroesophageal reflux disease)     Heel spur     Hypertension     Hypertrophy of both inferior nasal turbinates     Nasal septal deviation     Psoriasis     Sciatica      Social History     Socioeconomic History    Marital status:    Tobacco Use    Smoking status: Former     Packs/day: 0.25     Years: 22.00     Pack years: 5.50     Types: Cigarettes     Quit date: 3/1/2017     Years since quittin.6    Smokeless tobacco: Never    Tobacco comments:     less than 1 pack week   Substance and Sexual Activity    Alcohol use: Yes     Comment: socially    Drug use: No    Sexual activity: Yes     Partners: Male     Birth control/protection: None, Surgical     Past Surgical History:   Procedure Laterality Date    HYSTERECTOMY      Ileocolectomy          KNEE ARTHROSCOPY W/ DEBRIDEMENT      x 2, right    LAPAROSCOPIC CHOLECYSTECTOMY N/A 2021    Procedure: CHOLECYSTECTOMY, LAPAROSCOPIC;  Surgeon: Pepe Cool MD;  Location: [facility];  Service: General;  Laterality: N/A;    LUMBAR EPIDURAL INJECTION      TONSILLECTOMY      WRIST SURGERY Right     plate     Family History   Problem Relation Age of Onset    Hypertension Father     Cancer Father     Hypertension Mother     Heart disease Mother     Diabetes Brother     Hypertension Brother     Prostate  "cancer Brother     Diabetes Sister     Hypertension Sister     Ovarian cancer Neg Hx     Breast cancer Neg Hx      Vitals:    10/27/22 0811   Weight: 85.3 kg (188 lb 0.8 oz)   Height: 5' 3" (1.6 m)       Review of Systems   Constitutional:  Negative for chills, diaphoresis, fatigue, fever and unexpected weight change.   HENT:  Negative for trouble swallowing.    Eyes:  Negative for visual disturbance.   Respiratory:  Negative for shortness of breath.    Cardiovascular:  Negative for chest pain.   Gastrointestinal:  Negative for abdominal pain, constipation, nausea and vomiting.   Genitourinary:  Negative for difficulty urinating.   Musculoskeletal:  Negative for arthralgias, back pain, gait problem, joint swelling, myalgias, neck pain and neck stiffness.   Neurological:  Negative for dizziness, speech difficulty, weakness, light-headedness, numbness and headaches.        Objective:      Physical Exam  Neurological:      Mental Status: She is alert and oriented to person, place, and time.      Comments: She is awake and in no acute distress  Mild tenderness to palpation lumbar paraspinous musculature with no palpable masses  Forward flexion is to about 45 degrees before she complains of pain at the lumbosacral junction.  Extension at 10 degrees causes mild pain at the lumbosacral junction  She can heel and toe walk normally  Reflexes- +1-+2 reflexes at the following:   C5-Biceps   C6-Brachioradialis   C7-Triceps   L3/4-Patellar   S1-Achilles   Luis Eduardo sign negative bilaterally  Strength testing- 5/5 strength in the following muscle groups:  C5-Elbow flexion  C6-Wrist extension  C7-Elbow extension  C8-Finger flexion  T1-Finger abduction  L2-Hip flexion  L3-Knee extension  L4-Ankle dorsiflexion  L5-Great toe extension  S1-Ankle plantar flexion       Straight leg raise negative bilaterally  BRIANA test bilaterally causes ipsilateral low back pain           Assessment:       1. Low back pain with left-sided sciatica, " unspecified back pain laterality, unspecified chronicity             Plan:     She has a nonfocal neurological examination and no historical red flags.  I suspect she has low back pain on basis of degenerative disc disease and facet arthropathy.  She has symptoms of upper lumbar radiculitis with no evidence of nerve root dysfunction.  As recommended by Dr. Abdul we will proceed with transforaminal injections on left at L2-3 and L3-4.  She can follow up with me afterwards      Low back pain with left-sided sciatica, unspecified back pain laterality, unspecified chronicity

## 2022-10-27 NOTE — TELEPHONE ENCOUNTER
----- Message from Elliot Sepulveda MD sent at 10/27/2022  9:02 AM CDT -----  We can schedule      ----- Message -----  From: Elise Quintanilla LPN  Sent: 10/27/2022   8:44 AM CDT  To: Elliot Sepulveda MD    Ok to schedule?  ----- Message -----  From: Homar Michelle MD  Sent: 10/27/2022   8:27 AM CDT  To: Derick Valdez    Please schedule for transforaminal injection left L2-3 and L3-4

## 2022-10-28 ENCOUNTER — CLINICAL SUPPORT (OUTPATIENT)
Dept: CARDIOLOGY | Facility: HOSPITAL | Age: 62
End: 2022-10-28
Attending: FAMILY MEDICINE
Payer: COMMERCIAL

## 2022-10-28 DIAGNOSIS — R07.9 CHEST PAIN, UNSPECIFIED TYPE: ICD-10-CM

## 2022-10-28 LAB
CV STRESS BASE HR: 75 BPM
DIASTOLIC BLOOD PRESSURE: 87 MMHG
EJECTION FRACTION: 65 %
OHS CV CPX 1 MINUTE RECOVERY HEART RATE: 120 BPM
OHS CV CPX 85 PERCENT MAX PREDICTED HEART RATE MALE: 128
OHS CV CPX ESTIMATED METS: 7
OHS CV CPX MAX PREDICTED HEART RATE: 151
OHS CV CPX PATIENT IS FEMALE: 1
OHS CV CPX PATIENT IS MALE: 0
OHS CV CPX PEAK DIASTOLIC BLOOD PRESSURE: 106 MMHG
OHS CV CPX PEAK HEAR RATE: 136 BPM
OHS CV CPX PEAK RATE PRESSURE PRODUCT: NORMAL
OHS CV CPX PEAK SYSTOLIC BLOOD PRESSURE: 235 MMHG
OHS CV CPX PERCENT MAX PREDICTED HEART RATE ACHIEVED: 90
OHS CV CPX RATE PRESSURE PRODUCT PRESENTING: NORMAL
STRESS ECHO POST EXERCISE DUR MIN: 6 MINUTES
STRESS ECHO POST EXERCISE DUR SEC: 0 SECONDS
SYSTOLIC BLOOD PRESSURE: 155 MMHG

## 2022-10-28 PROCEDURE — 93351 STRESS ECHO (CUPID ONLY): ICD-10-PCS | Mod: 26,,, | Performed by: SPECIALIST

## 2022-10-28 PROCEDURE — 93351 STRESS TTE COMPLETE: CPT | Mod: 26,,, | Performed by: SPECIALIST

## 2022-10-28 PROCEDURE — 93351 STRESS TTE COMPLETE: CPT

## 2022-11-03 ENCOUNTER — PES CALL (OUTPATIENT)
Dept: ADMINISTRATIVE | Facility: CLINIC | Age: 62
End: 2022-11-03
Payer: COMMERCIAL

## 2022-11-16 ENCOUNTER — TELEPHONE (OUTPATIENT)
Dept: PAIN MEDICINE | Facility: CLINIC | Age: 62
End: 2022-11-16
Payer: COMMERCIAL

## 2022-11-16 NOTE — TELEPHONE ENCOUNTER
----- Message from Andrew Perez sent at 11/16/2022 12:56 PM CST -----  Type: Needs Medical Advice  Who Called:  Patient    Best Call Back Number: [phone]  Additional Information: Patient states that she would like a callback regarding her procedure not being authorized.

## 2022-11-16 NOTE — TELEPHONE ENCOUNTER
Isadora Norman RN  P Derick Zarate Staff  Per Robert from [facility]   Case was denied because it does not meet medical criteria   Call [phone] to discuss peer to peer or appeal options.   Case# [id number]       Will Dr Michelle preform p2p? If not pt will need to be cancelled  and pt informed and an appeal may need to be done later. Please let me know if a p2p will be done. This pts procedure is for 11/18 so time is of the essence. Thank you.

## 2022-11-17 ENCOUNTER — PATIENT MESSAGE (OUTPATIENT)
Dept: SPINE | Facility: CLINIC | Age: 62
End: 2022-11-17
Payer: COMMERCIAL

## 2022-11-17 NOTE — TELEPHONE ENCOUNTER
Dr Michelle pt. Do we have an update? Are we doing a p2p? If not pt needs to be cancelled. Please advise.

## 2022-11-17 NOTE — TELEPHONE ENCOUNTER
DR Michelle will do the p2p today after clinic.  We will update after we receive auth or denial FYI

## 2022-11-17 NOTE — TELEPHONE ENCOUNTER
----- Message from Lisseth Rios sent at 11/17/2022  9:40 AM CST -----  .Type:  Patient Call Back    Who Called: PT       Does the patient know what this is regarding?: PT CALLED TO SEE IF THE PROCEDURE WAS APPROVED BY HER INSURANCE. I WILL ALSO LET PT SPEAK WITH [facility]     Would the patient rather a call back     Best Call Back Number:    Additional Information: Thank You

## 2022-11-29 ENCOUNTER — PATIENT MESSAGE (OUTPATIENT)
Dept: SPINE | Facility: CLINIC | Age: 62
End: 2022-11-29
Payer: COMMERCIAL

## 2022-11-30 ENCOUNTER — TELEPHONE (OUTPATIENT)
Dept: PAIN MEDICINE | Facility: CLINIC | Age: 62
End: 2022-11-30
Payer: COMMERCIAL

## 2022-11-30 NOTE — TELEPHONE ENCOUNTER
Pt called and is sick, has sinus infection , let surgery center know to cancel, said when felt better would call and reschedule NIDIA for 12/01/2022

## 2022-11-30 NOTE — TELEPHONE ENCOUNTER
----- Message from Ngozi Krueger sent at 11/30/2022  8:48 AM CST -----  Regarding: cancellation request  Name of Who is Calling:DYLON ABDALLA [[id number]]          What is the request in detail:cancellation request on procedure              Can the clinic reply by [facility]:no           [facility]:[phone] (home)

## 2022-12-02 ENCOUNTER — TELEPHONE (OUTPATIENT)
Dept: FAMILY MEDICINE | Facility: CLINIC | Age: 62
End: 2022-12-02
Payer: COMMERCIAL

## 2023-04-10 ENCOUNTER — TELEPHONE (OUTPATIENT)
Dept: FAMILY MEDICINE | Facility: CLINIC | Age: 63
End: 2023-04-10
Payer: COMMERCIAL

## 2023-04-10 DIAGNOSIS — I10 HYPERTENSION, ESSENTIAL: ICD-10-CM

## 2023-04-10 DIAGNOSIS — F41.9 ANXIETY: ICD-10-CM

## 2023-04-10 NOTE — TELEPHONE ENCOUNTER
----- Message from Michela Loza sent at 4/10/2023  1:56 PM CDT -----  Contact: pt  Pt is calling to check status of her prescription   Please give pt a call back [phone]

## 2023-04-11 DIAGNOSIS — I10 HYPERTENSION, ESSENTIAL: ICD-10-CM

## 2023-04-11 RX ORDER — METOPROLOL SUCCINATE 50 MG/1
TABLET, EXTENDED RELEASE ORAL
Qty: 90 TABLET | Refills: 1 | OUTPATIENT
Start: 2023-04-11

## 2023-04-11 RX ORDER — ALPRAZOLAM 0.5 MG/1
TABLET ORAL
Qty: 30 TABLET | OUTPATIENT
Start: 2023-04-11

## 2023-04-11 RX ORDER — METOPROLOL SUCCINATE 50 MG/1
50 TABLET, EXTENDED RELEASE ORAL DAILY
Qty: 90 TABLET | Refills: 0 | Status: SHIPPED | OUTPATIENT
Start: 2023-04-11 | End: 2023-05-22 | Stop reason: SDUPTHER

## 2023-04-11 NOTE — TELEPHONE ENCOUNTER
Refill Routing Note   Medication(s) are not appropriate for processing by [facility] for the following reason(s):      Medication outside of protocol  No active prescription written by PCP    [facility] action(s):  Defer  Route     Medication Therapy Plan: Metoprolol- no active prescription written by PCP; defer. Xanax- med outside of [facility] protocol; route to PCP      Appointments  past 12m or future 3m with PCP    Date Provider   Last Visit   10/10/2022 Tanvir Cisneros III, MD   Next Visit   Visit date not found Tanvir Cisneros III, MD   ED visits in past 90 days: 0        Note composed:8:28 AM 04/11/2023

## 2023-04-11 NOTE — TELEPHONE ENCOUNTER
No new care gaps identified.  [facility] Embedded Care Gaps. Reference number: [id number]. 4/10/2023   9:55:22 PM CDT

## 2023-04-11 NOTE — TELEPHONE ENCOUNTER
"Patient has lab only appt 4/25/23 for \"labs [facility]\", no orders in chart.  Please place FUTURE orders in Epic if appropriate.    Thanks,   Liane lab    " [facility] Staff  Edgardo is still pending review and Nevaeh have no known ETA for release. Auth has been pending for a while and I will send msg to leadership to see if they can manage care.     MRN [id number]      Will await to see what managers can do to clear this case.

## 2023-04-11 NOTE — TELEPHONE ENCOUNTER
No new care gaps identified.  [facility] Embedded Care Gaps. Reference number: [id number]. 4/11/2023   3:35:10 PM CDT

## 2023-05-09 ENCOUNTER — TELEPHONE (OUTPATIENT)
Dept: FAMILY MEDICINE | Facility: CLINIC | Age: 63
End: 2023-05-09
Payer: COMMERCIAL

## 2023-05-09 DIAGNOSIS — Z12.31 ENCOUNTER FOR SCREENING MAMMOGRAM FOR MALIGNANT NEOPLASM OF BREAST: Primary | ICD-10-CM

## 2023-05-09 NOTE — TELEPHONE ENCOUNTER
----- Message from Yanira Love sent at 5/9/2023 12:20 PM CDT -----  Contact: pt  Type: Needs Medical Advice  Who Called:   pt  Symptoms (please be specific):  pressure in the head, sinus issues, clogged nose   How long has patient had these symptoms:  3 day   Pharmacy name and phone #:    [facility] #[id number] [address] & [address] [address]  Phone: [phone] Fax: [phone]    Best Call Back Number: [phone]  Additional Information: pt asking for something to be called in to pharm to help her

## 2023-05-10 NOTE — TELEPHONE ENCOUNTER
Spoke to pt , states just sinus pressure , no fever no cough no sob she started taking the otc sudafed for for bp  pts seemed to help , told pt if not better cb soshe can be seen or if after hours to urgent care if worse.

## 2023-05-16 ENCOUNTER — TELEPHONE (OUTPATIENT)
Dept: PODIATRY | Facility: CLINIC | Age: 63
End: 2023-05-16
Payer: COMMERCIAL

## 2023-05-16 NOTE — TELEPHONE ENCOUNTER
----- Message from Colleen Shine sent at 5/16/2023  2:01 PM CDT -----  Regarding: PATIENT CALL BACK  Name of Who is Calling: DYLON ABDALLA [[id number]]      What is the request in detail: Patient is requesting a call back to schedule a new patient appointment for pain in her foot. Patient is requesting an appointment on  5/30/2023 or 6/5/2023. No appointments was available to schedule. Please advise!      Can the clinic reply by [facility]: NO        What Number [facility] Call Back [facility]: [phone]

## 2023-05-22 ENCOUNTER — OFFICE VISIT (OUTPATIENT)
Dept: FAMILY MEDICINE | Facility: CLINIC | Age: 63
End: 2023-05-22
Payer: COMMERCIAL

## 2023-05-22 VITALS
TEMPERATURE: 97 F | DIASTOLIC BLOOD PRESSURE: 70 MMHG | HEART RATE: 76 BPM | OXYGEN SATURATION: 96 % | BODY MASS INDEX: 33.49 KG/M2 | SYSTOLIC BLOOD PRESSURE: 124 MMHG | WEIGHT: 189 LBS | HEIGHT: 63 IN

## 2023-05-22 DIAGNOSIS — K21.9 GASTROESOPHAGEAL REFLUX DISEASE, UNSPECIFIED WHETHER ESOPHAGITIS PRESENT: ICD-10-CM

## 2023-05-22 DIAGNOSIS — L40.9 PSORIASIS: ICD-10-CM

## 2023-05-22 DIAGNOSIS — L40.50 PSORIATIC ARTHRITIS: ICD-10-CM

## 2023-05-22 DIAGNOSIS — F41.9 ANXIETY: ICD-10-CM

## 2023-05-22 DIAGNOSIS — I10 HYPERTENSION, ESSENTIAL: Primary | ICD-10-CM

## 2023-05-22 DIAGNOSIS — Z13.1 SCREENING FOR DIABETES MELLITUS (DM): ICD-10-CM

## 2023-05-22 DIAGNOSIS — J32.9 SINUSITIS, UNSPECIFIED CHRONICITY, UNSPECIFIED LOCATION: ICD-10-CM

## 2023-05-22 DIAGNOSIS — M54.9 BACK PAIN, UNSPECIFIED BACK LOCATION, UNSPECIFIED BACK PAIN LATERALITY, UNSPECIFIED CHRONICITY: ICD-10-CM

## 2023-05-22 DIAGNOSIS — Z79.82 ASPIRIN LONG-TERM USE: ICD-10-CM

## 2023-05-22 DIAGNOSIS — J30.9 ALLERGIC RHINITIS, UNSPECIFIED SEASONALITY, UNSPECIFIED TRIGGER: ICD-10-CM

## 2023-05-22 DIAGNOSIS — Z79.890 HORMONE REPLACEMENT THERAPY: ICD-10-CM

## 2023-05-22 PROCEDURE — 3008F PR BODY MASS INDEX (BMI) DOCUMENTED: ICD-10-PCS | Mod: CPTII,S$GLB,, | Performed by: FAMILY MEDICINE

## 2023-05-22 PROCEDURE — 3078F DIAST BP <80 MM HG: CPT | Mod: CPTII,S$GLB,, | Performed by: FAMILY MEDICINE

## 2023-05-22 PROCEDURE — 99214 OFFICE O/P EST MOD 30 MIN: CPT | Mod: S$GLB,,, | Performed by: FAMILY MEDICINE

## 2023-05-22 PROCEDURE — 1160F PR REVIEW ALL MEDS BY PRESCRIBER/CLIN PHARMACIST DOCUMENTED: ICD-10-PCS | Mod: CPTII,S$GLB,, | Performed by: FAMILY MEDICINE

## 2023-05-22 PROCEDURE — 3074F PR MOST RECENT SYSTOLIC BLOOD PRESSURE < 130 MM HG: ICD-10-PCS | Mod: CPTII,S$GLB,, | Performed by: FAMILY MEDICINE

## 2023-05-22 PROCEDURE — 99499 RISK ADDL DX/OHS AUDIT: ICD-10-PCS | Mod: S$GLB,,, | Performed by: FAMILY MEDICINE

## 2023-05-22 PROCEDURE — 99214 PR OFFICE/OUTPT VISIT, EST, LEVL IV, 30-39 MIN: ICD-10-PCS | Mod: S$GLB,,, | Performed by: FAMILY MEDICINE

## 2023-05-22 PROCEDURE — 3008F BODY MASS INDEX DOCD: CPT | Mod: CPTII,S$GLB,, | Performed by: FAMILY MEDICINE

## 2023-05-22 PROCEDURE — 3074F SYST BP LT 130 MM HG: CPT | Mod: CPTII,S$GLB,, | Performed by: FAMILY MEDICINE

## 2023-05-22 PROCEDURE — 1159F MED LIST DOCD IN RCRD: CPT | Mod: CPTII,S$GLB,, | Performed by: FAMILY MEDICINE

## 2023-05-22 PROCEDURE — 1160F RVW MEDS BY RX/DR IN RCRD: CPT | Mod: CPTII,S$GLB,, | Performed by: FAMILY MEDICINE

## 2023-05-22 PROCEDURE — 3078F PR MOST RECENT DIASTOLIC BLOOD PRESSURE < 80 MM HG: ICD-10-PCS | Mod: CPTII,S$GLB,, | Performed by: FAMILY MEDICINE

## 2023-05-22 PROCEDURE — 1159F PR MEDICATION LIST DOCUMENTED IN MEDICAL RECORD: ICD-10-PCS | Mod: CPTII,S$GLB,, | Performed by: FAMILY MEDICINE

## 2023-05-22 PROCEDURE — 99499 UNLISTED E&M SERVICE: CPT | Mod: S$GLB,,, | Performed by: FAMILY MEDICINE

## 2023-05-22 RX ORDER — LEVOCETIRIZINE DIHYDROCHLORIDE 5 MG/1
5 TABLET, FILM COATED ORAL NIGHTLY
Qty: 90 TABLET | Refills: 1 | Status: SHIPPED | OUTPATIENT
Start: 2023-05-22 | End: 2023-10-26

## 2023-05-22 RX ORDER — ESCITALOPRAM OXALATE 10 MG/1
10 TABLET ORAL DAILY
Qty: 90 TABLET | Refills: 0 | Status: SHIPPED | OUTPATIENT
Start: 2023-05-22 | End: 2023-06-22

## 2023-05-22 RX ORDER — METOPROLOL SUCCINATE 50 MG/1
50 TABLET, EXTENDED RELEASE ORAL DAILY
Qty: 90 TABLET | Refills: 1 | Status: SHIPPED | OUTPATIENT
Start: 2023-05-22 | End: 2023-12-26 | Stop reason: SDUPTHER

## 2023-05-22 RX ORDER — FLUTICASONE PROPIONATE 50 MCG
1 SPRAY, SUSPENSION (ML) NASAL DAILY
Qty: 16 G | Refills: 5 | Status: SHIPPED | OUTPATIENT
Start: 2023-05-22 | End: 2023-12-26 | Stop reason: SDUPTHER

## 2023-05-22 RX ORDER — ESCITALOPRAM OXALATE 10 MG/1
10 TABLET ORAL DAILY
COMMUNITY
End: 2023-05-22 | Stop reason: SDUPTHER

## 2023-05-22 RX ORDER — ALPRAZOLAM 0.5 MG/1
0.5 TABLET ORAL NIGHTLY PRN
Qty: 30 TABLET | Refills: 0 | Status: SHIPPED | OUTPATIENT
Start: 2023-05-22 | End: 2023-06-22 | Stop reason: SDUPTHER

## 2023-05-22 RX ORDER — AZELASTINE 1 MG/ML
1 SPRAY, METERED NASAL 2 TIMES DAILY
Qty: 90 ML | Refills: 1 | Status: SHIPPED | OUTPATIENT
Start: 2023-05-22 | End: 2023-12-26 | Stop reason: SDUPTHER

## 2023-05-22 RX ORDER — IBUPROFEN 800 MG/1
TABLET ORAL
Qty: 30 TABLET | Refills: 2 | Status: SHIPPED | OUTPATIENT
Start: 2023-05-22 | End: 2023-12-26 | Stop reason: SDUPTHER

## 2023-05-22 RX ORDER — SPIRONOLACTONE 25 MG/1
25 TABLET ORAL DAILY
Qty: 90 TABLET | Refills: 1 | Status: SHIPPED | OUTPATIENT
Start: 2023-05-22 | End: 2023-06-22

## 2023-05-22 RX ORDER — PANTOPRAZOLE SODIUM 40 MG/1
40 TABLET, DELAYED RELEASE ORAL DAILY
Qty: 90 TABLET | Refills: 1 | Status: SHIPPED | OUTPATIENT
Start: 2023-05-22 | End: 2023-12-26 | Stop reason: SDUPTHER

## 2023-05-22 RX ORDER — LANOLIN ALCOHOL/MO/W.PET/CERES
400 CREAM (GRAM) TOPICAL DAILY
Qty: 90 TABLET | Refills: 1 | Status: SHIPPED | OUTPATIENT
Start: 2023-05-22 | End: 2023-12-26 | Stop reason: SDUPTHER

## 2023-05-22 RX ORDER — ESTRADIOL 1 MG/1
TABLET ORAL
Qty: 90 TABLET | Refills: 1 | Status: SHIPPED | OUTPATIENT
Start: 2023-05-22 | End: 2023-12-26 | Stop reason: SDUPTHER

## 2023-05-23 PROBLEM — K21.9 GASTROESOPHAGEAL REFLUX DISEASE: Status: ACTIVE | Noted: 2023-05-23

## 2023-05-23 PROBLEM — L40.9 PSORIASIS: Status: ACTIVE | Noted: 2023-05-23

## 2023-05-24 NOTE — PROGRESS NOTES
Subjective:       Patient ID: Paula Hanna is a 63 y.o. female.    Chief Complaint: Follow-up and Medication Refill    Sinusitis symptoms.  Went to urgent care had shot.  That was 1/2 weeks ago.  Slightly better.  Was on doxycycline.  No fever chills.  Hypertension is doing okay.  A little edema.  No chest pain.  Having a flare of her psoriasis.  And has psoriatic arthritis.  Anxiety issues have been more severe.  More stress.  Raising her son's children.  Rare Xanax use.  Needs refill now however.  Using aspirin.  Allergic rhinitis Xyzal prescription.  Gastroesophageal reflux doing well no dysphagia.  Back pain with sciatica using gabapentin.  Helps.  BMI is at 33.5 increased slightly.  On estrogen replacement therapy.    Physical examination.  Vital signs are noted.  Neck without bruit no adenopathy.  Chest clear.  Heart regular rate rhythm.  Abdomen bowel sounds are positive soft nontender.  Extremities ankles slightly puffy.  No edema of the feet or lower legs.  Seems to be joint related.      Objective:        Assessment:       1. Hypertension, essential    2. Anxiety    3. Sinusitis, unspecified chronicity, unspecified location    4. Gastroesophageal reflux disease, unspecified whether esophagitis present    5. Aspirin long-term use    6. Psoriatic arthritis    7. Allergic rhinitis, unspecified seasonality, unspecified trigger    8. Back pain, unspecified back location, unspecified back pain laterality, unspecified chronicity    9. BMI 33.0-33.9,adult    10. Hormone replacement therapy    11. Screening for diabetes mellitus (DM)    12. Psoriasis        Plan:       Hypertension, essential  -     Comprehensive Metabolic Panel; Future; Expected date: 05/22/2023  -     Lipid Panel; Future; Expected date: 05/22/2023    Anxiety    Sinusitis, unspecified chronicity, unspecified location    Gastroesophageal reflux disease, unspecified whether esophagitis present    Aspirin long-term use    Psoriatic  arthritis    Allergic rhinitis, unspecified seasonality, unspecified trigger  -     CBC Auto Differential; Future; Expected date: 05/22/2023    Back pain, unspecified back location, unspecified back pain laterality, unspecified chronicity    BMI 33.0-33.9,adult    Hormone replacement therapy  -     TSH; Future; Expected date: 05/22/2023    Screening for diabetes mellitus (DM)  -     Hemoglobin A1C; Future; Expected date: 05/22/2023    Psoriasis    Other orders  -     ALPRAZolam (XANAX) 0.5 MG tablet; Take 1 tablet (0.5 mg total) by mouth nightly as needed for Anxiety.  Dispense: 30 tablet; Refill: 0  -     azelastine (ASTELIN) 137 mcg (0.1 %) nasal spray; 1 spray (137 mcg total) by Nasal route 2 (two) times daily.  Dispense: 90 mL; Refill: 1  -     estradioL (ESTRACE) 1 MG tablet; TAKE 1 TABLET(1 MG) BY MOUTH EVERY DAY  Dispense: 90 tablet; Refill: 1  -     fluticasone propionate (FLONASE) 50 mcg/actuation nasal spray; 1 spray (50 mcg total) by Each Nostril route once daily.  Dispense: 16 g; Refill: 5  -     ibuprofen (ADVIL,MOTRIN) 800 MG tablet; TAKE 1 TABLET(800 MG) BY MOUTH THREE TIMES DAILY EVERY 8 HOURS AS NEEDED FOR PAIN  Dispense: 30 tablet; Refill: 2  -     levocetirizine (XYZAL) 5 MG tablet; Take 1 tablet (5 mg total) by mouth every evening.  Dispense: 90 tablet; Refill: 1  -     magnesium oxide (MAG-OX) 400 mg (241.3 mg magnesium) tablet; Take 1 tablet (400 mg total) by mouth once daily.  Dispense: 90 tablet; Refill: 1  -     metoprolol succinate (TOPROL-XL) 50 MG 24 hr tablet; Take 1 tablet (50 mg total) by mouth once daily.  Dispense: 90 tablet; Refill: 1  -     pantoprazole (PROTONIX) 40 MG tablet; Take 1 tablet (40 mg total) by mouth once daily.  Dispense: 90 tablet; Refill: 1  -     spironolactone (ALDACTONE) 25 MG tablet; Take 1 tablet (25 mg total) by mouth once daily.  Dispense: 90 tablet; Refill: 1  -     EScitalopram oxalate (LEXAPRO) 10 MG tablet; Take 1 tablet (10 mg total) by mouth once  daily.  Dispense: 90 tablet; Refill: 0      Refill Lexapro 10 mg daily 90 pills.  Xanax 0.5 30 pills p.r.n. daily maximum.  Follow-up in 4 weeks.  Mammogram in 4 days at [facility].  A1c CBC CMP lipids TSH ordered.  Increase Aldactone to 25 mg daily 90 with a refill.  Follow-up in 1 month.  Ankle swelling maybe due to her psoriatic arthritis.

## 2023-06-02 ENCOUNTER — HOSPITAL ENCOUNTER (OUTPATIENT)
Dept: RADIOLOGY | Facility: HOSPITAL | Age: 63
Discharge: HOME | End: 2023-06-02
Attending: FAMILY MEDICINE
Payer: COMMERCIAL

## 2023-06-02 DIAGNOSIS — Z12.31 ENCOUNTER FOR SCREENING MAMMOGRAM FOR MALIGNANT NEOPLASM OF BREAST: ICD-10-CM

## 2023-06-02 PROCEDURE — 77067 SCR MAMMO BI INCL CAD: CPT | Mod: TC,PO

## 2023-06-09 ENCOUNTER — TELEPHONE (OUTPATIENT)
Dept: FAMILY MEDICINE | Facility: CLINIC | Age: 63
End: 2023-06-09
Payer: COMMERCIAL

## 2023-06-09 NOTE — TELEPHONE ENCOUNTER
----- Message from Tanvir Cisneros III, MD sent at 6/2/2023  8:04 PM CDT -----  NORMAL followup as needed.

## 2023-06-19 ENCOUNTER — LAB VISIT (OUTPATIENT)
Dept: LAB | Facility: HOSPITAL | Age: 63
End: 2023-06-19
Attending: FAMILY MEDICINE
Payer: COMMERCIAL

## 2023-06-19 DIAGNOSIS — Z13.1 SCREENING FOR DIABETES MELLITUS (DM): ICD-10-CM

## 2023-06-19 DIAGNOSIS — Z79.890 HORMONE REPLACEMENT THERAPY: ICD-10-CM

## 2023-06-19 DIAGNOSIS — I10 HYPERTENSION, ESSENTIAL: ICD-10-CM

## 2023-06-19 DIAGNOSIS — J30.9 ALLERGIC RHINITIS, UNSPECIFIED SEASONALITY, UNSPECIFIED TRIGGER: ICD-10-CM

## 2023-06-19 LAB
ALBUMIN SERPL BCP-MCNC: 3.8 G/DL (ref 3.5–5.2)
ALP SERPL-CCNC: 66 U/L (ref 55–135)
ALT SERPL W/O P-5'-P-CCNC: 17 U/L (ref 10–44)
ANION GAP SERPL CALC-SCNC: 6 MMOL/L (ref 8–16)
AST SERPL-CCNC: 20 U/L (ref 10–40)
BASOPHILS # BLD AUTO: 0.07 K/UL (ref 0–0.2)
BASOPHILS NFR BLD: 1.2 % (ref 0–1.9)
BILIRUB SERPL-MCNC: 0.6 MG/DL (ref 0.1–1)
BUN SERPL-MCNC: 20 MG/DL (ref 8–23)
CALCIUM SERPL-MCNC: 8.7 MG/DL (ref 8.7–10.5)
CHLORIDE SERPL-SCNC: 110 MMOL/L (ref 95–110)
CHOLEST SERPL-MCNC: 166 MG/DL (ref 120–199)
CHOLEST/HDLC SERPL: 2.8 {RATIO} (ref 2–5)
CO2 SERPL-SCNC: 23 MMOL/L (ref 23–29)
CREAT SERPL-MCNC: 1 MG/DL (ref 0.5–1.4)
DIFFERENTIAL METHOD: ABNORMAL
EOSINOPHIL # BLD AUTO: 0.2 K/UL (ref 0–0.5)
EOSINOPHIL NFR BLD: 3.2 % (ref 0–8)
ERYTHROCYTE [DISTWIDTH] IN BLOOD BY AUTOMATED COUNT: 14.6 % (ref 11.5–14.5)
EST. GFR  (NO RACE VARIABLE): >60 ML/MIN/1.73 M^2
ESTIMATED AVG GLUCOSE: 117 MG/DL (ref 68–131)
GLUCOSE SERPL-MCNC: 102 MG/DL (ref 70–110)
HBA1C MFR BLD: 5.7 % (ref 4.5–6.2)
HCT VFR BLD AUTO: 37.7 % (ref 37–48.5)
HDLC SERPL-MCNC: 59 MG/DL (ref 40–75)
HDLC SERPL: 35.5 % (ref 20–50)
HGB BLD-MCNC: 12 G/DL (ref 12–16)
IMM GRANULOCYTES # BLD AUTO: 0.01 K/UL (ref 0–0.04)
IMM GRANULOCYTES NFR BLD AUTO: 0.2 % (ref 0–0.5)
LDLC SERPL CALC-MCNC: 84.2 MG/DL (ref 63–159)
LYMPHOCYTES # BLD AUTO: 2.9 K/UL (ref 1–4.8)
LYMPHOCYTES NFR BLD: 49 % (ref 18–48)
MCH RBC QN AUTO: 29 PG (ref 27–31)
MCHC RBC AUTO-ENTMCNC: 31.8 G/DL (ref 32–36)
MCV RBC AUTO: 91 FL (ref 82–98)
MONOCYTES # BLD AUTO: 0.5 K/UL (ref 0.3–1)
MONOCYTES NFR BLD: 7.8 % (ref 4–15)
NEUTROPHILS # BLD AUTO: 2.3 K/UL (ref 1.8–7.7)
NEUTROPHILS NFR BLD: 38.6 % (ref 38–73)
NONHDLC SERPL-MCNC: 107 MG/DL
NRBC BLD-RTO: 0 /100 WBC
PLATELET # BLD AUTO: 223 K/UL (ref 150–450)
PMV BLD AUTO: 10.4 FL (ref 9.2–12.9)
POTASSIUM SERPL-SCNC: 4.1 MMOL/L (ref 3.5–5.1)
PROT SERPL-MCNC: 6.8 G/DL (ref 6–8.4)
RBC # BLD AUTO: 4.14 M/UL (ref 4–5.4)
SODIUM SERPL-SCNC: 139 MMOL/L (ref 136–145)
TRIGL SERPL-MCNC: 114 MG/DL (ref 30–150)
TSH SERPL DL<=0.005 MIU/L-ACNC: 1.96 UIU/ML (ref 0.34–5.6)
WBC # BLD AUTO: 5.92 K/UL (ref 3.9–12.7)

## 2023-06-19 PROCEDURE — 84443 ASSAY THYROID STIM HORMONE: CPT | Performed by: FAMILY MEDICINE

## 2023-06-19 PROCEDURE — 80053 COMPREHEN METABOLIC PANEL: CPT | Performed by: FAMILY MEDICINE

## 2023-06-19 PROCEDURE — 85025 COMPLETE CBC W/AUTO DIFF WBC: CPT | Performed by: FAMILY MEDICINE

## 2023-06-19 PROCEDURE — 36415 COLL VENOUS BLD VENIPUNCTURE: CPT | Performed by: FAMILY MEDICINE

## 2023-06-19 PROCEDURE — 80061 LIPID PANEL: CPT | Performed by: FAMILY MEDICINE

## 2023-06-19 PROCEDURE — 83036 HEMOGLOBIN GLYCOSYLATED A1C: CPT | Performed by: FAMILY MEDICINE

## 2023-06-22 ENCOUNTER — OFFICE VISIT (OUTPATIENT)
Dept: FAMILY MEDICINE | Facility: CLINIC | Age: 63
End: 2023-06-22
Payer: COMMERCIAL

## 2023-06-22 ENCOUNTER — LAB VISIT (OUTPATIENT)
Dept: LAB | Facility: HOSPITAL | Age: 63
End: 2023-06-22
Attending: FAMILY MEDICINE
Payer: COMMERCIAL

## 2023-06-22 VITALS
DIASTOLIC BLOOD PRESSURE: 80 MMHG | WEIGHT: 192.19 LBS | HEART RATE: 68 BPM | SYSTOLIC BLOOD PRESSURE: 136 MMHG | BODY MASS INDEX: 34.05 KG/M2 | TEMPERATURE: 97 F | HEIGHT: 63 IN | OXYGEN SATURATION: 98 %

## 2023-06-22 DIAGNOSIS — Z79.82 ASPIRIN LONG-TERM USE: ICD-10-CM

## 2023-06-22 DIAGNOSIS — R25.2 LEG CRAMP: ICD-10-CM

## 2023-06-22 DIAGNOSIS — F41.9 ANXIETY: Primary | ICD-10-CM

## 2023-06-22 DIAGNOSIS — L40.50 PSORIATIC ARTHRITIS: ICD-10-CM

## 2023-06-22 DIAGNOSIS — R60.0 PEDAL EDEMA: ICD-10-CM

## 2023-06-22 LAB — MAGNESIUM SERPL-MCNC: 2.2 MG/DL (ref 1.6–2.6)

## 2023-06-22 PROCEDURE — 36415 COLL VENOUS BLD VENIPUNCTURE: CPT | Performed by: FAMILY MEDICINE

## 2023-06-22 PROCEDURE — 1159F MED LIST DOCD IN RCRD: CPT | Mod: CPTII,S$GLB,, | Performed by: FAMILY MEDICINE

## 2023-06-22 PROCEDURE — 3079F PR MOST RECENT DIASTOLIC BLOOD PRESSURE 80-89 MM HG: ICD-10-PCS | Mod: CPTII,S$GLB,, | Performed by: FAMILY MEDICINE

## 2023-06-22 PROCEDURE — 3044F HG A1C LEVEL LT 7.0%: CPT | Mod: CPTII,S$GLB,, | Performed by: FAMILY MEDICINE

## 2023-06-22 PROCEDURE — 3075F SYST BP GE 130 - 139MM HG: CPT | Mod: CPTII,S$GLB,, | Performed by: FAMILY MEDICINE

## 2023-06-22 PROCEDURE — 3079F DIAST BP 80-89 MM HG: CPT | Mod: CPTII,S$GLB,, | Performed by: FAMILY MEDICINE

## 2023-06-22 PROCEDURE — 3008F PR BODY MASS INDEX (BMI) DOCUMENTED: ICD-10-PCS | Mod: CPTII,S$GLB,, | Performed by: FAMILY MEDICINE

## 2023-06-22 PROCEDURE — 3044F PR MOST RECENT HEMOGLOBIN A1C LEVEL <7.0%: ICD-10-PCS | Mod: CPTII,S$GLB,, | Performed by: FAMILY MEDICINE

## 2023-06-22 PROCEDURE — 99214 PR OFFICE/OUTPT VISIT, EST, LEVL IV, 30-39 MIN: ICD-10-PCS | Mod: S$GLB,,, | Performed by: FAMILY MEDICINE

## 2023-06-22 PROCEDURE — 3008F BODY MASS INDEX DOCD: CPT | Mod: CPTII,S$GLB,, | Performed by: FAMILY MEDICINE

## 2023-06-22 PROCEDURE — 1159F PR MEDICATION LIST DOCUMENTED IN MEDICAL RECORD: ICD-10-PCS | Mod: CPTII,S$GLB,, | Performed by: FAMILY MEDICINE

## 2023-06-22 PROCEDURE — 3075F PR MOST RECENT SYSTOLIC BLOOD PRESS GE 130-139MM HG: ICD-10-PCS | Mod: CPTII,S$GLB,, | Performed by: FAMILY MEDICINE

## 2023-06-22 PROCEDURE — 1160F RVW MEDS BY RX/DR IN RCRD: CPT | Mod: CPTII,S$GLB,, | Performed by: FAMILY MEDICINE

## 2023-06-22 PROCEDURE — 1160F PR REVIEW ALL MEDS BY PRESCRIBER/CLIN PHARMACIST DOCUMENTED: ICD-10-PCS | Mod: CPTII,S$GLB,, | Performed by: FAMILY MEDICINE

## 2023-06-22 PROCEDURE — 99214 OFFICE O/P EST MOD 30 MIN: CPT | Mod: S$GLB,,, | Performed by: FAMILY MEDICINE

## 2023-06-22 PROCEDURE — 83735 ASSAY OF MAGNESIUM: CPT | Performed by: FAMILY MEDICINE

## 2023-06-22 RX ORDER — ALPRAZOLAM 0.5 MG/1
0.5 TABLET ORAL NIGHTLY PRN
Qty: 30 TABLET | Refills: 1 | Status: SHIPPED | OUTPATIENT
Start: 2023-06-22 | End: 2023-12-26 | Stop reason: SDUPTHER

## 2023-06-23 ENCOUNTER — TELEPHONE (OUTPATIENT)
Dept: FAMILY MEDICINE | Facility: CLINIC | Age: 63
End: 2023-06-23
Payer: COMMERCIAL

## 2023-06-23 NOTE — TELEPHONE ENCOUNTER
----- Message from Tanvir Cisneros III, MD sent at 6/22/2023  8:52 PM CDT -----  NORMAL followup as needed.

## 2023-06-25 NOTE — PROGRESS NOTES
Anxiety Xanax doing okay for her.  About every other day.  Afraid of starting the Lexapro.  A lot of stress currently.  Pedal edema increase his by the end of the day.  TSH 1.9.  Leg cramps increased out back tongue to 25 daily and cramping so discontinued it.  Cramps in the shin.  On Mag-Ox.  Does have psoriatic arthritis.  Using aspirin.  Cholesterol 166 HDL 59 LDL 84 potassium 4.1 CMP normal CBC normal.  A1c 5.7.    Physical examination.  Vital signs noted.  No acute distress.  Neck without bruit.  Chest clear.  Heart regular rate and rhythm.  Abdomen bowel sounds are positive soft nontender.  Extremities without edema positive pedal pulses.  Straight leg raising is negative.  Subjective:       Patient ID: Paula Hanna is a 63 y.o. female.    Chief Complaint: Follow-up    HPI    Objective:        Assessment:       1. Anxiety    2. Pedal edema    3. Leg cramp    4. Psoriatic arthritis    5. Aspirin long-term use    6. BMI 34.0-34.9,adult        Plan:       Anxiety    Pedal edema    Leg cramp  -     Magnesium; Future; Expected date: 06/22/2023    Psoriatic arthritis    Aspirin long-term use    BMI 34.0-34.9,adult    Other orders  -     ALPRAZolam (XANAX) 0.5 MG tablet; Take 1 tablet (0.5 mg total) by mouth nightly as needed for Anxiety.  Dispense: 30 tablet; Refill: 1    Check a magnesium level.  Go ahead and stay off the Lexapro.  Use the Xanax p.r.n. daily maximum 30 with a refill.  Try the Aldactone at 25 mg every other day.  Follow-up p.r.n. for refills

## 2023-07-26 ENCOUNTER — OFFICE VISIT (OUTPATIENT)
Dept: ORTHOPEDICS | Facility: CLINIC | Age: 63
End: 2023-07-26
Payer: COMMERCIAL

## 2023-07-26 VITALS — WEIGHT: 192 LBS | HEIGHT: 63 IN | BODY MASS INDEX: 34.02 KG/M2

## 2023-07-26 DIAGNOSIS — M17.12 PRIMARY OSTEOARTHRITIS OF LEFT KNEE: Primary | ICD-10-CM

## 2023-07-26 PROCEDURE — 20610 DRAIN/INJ JOINT/BURSA W/O US: CPT | Mod: LT,S$GLB,, | Performed by: ORTHOPAEDIC SURGERY

## 2023-07-26 PROCEDURE — 3044F HG A1C LEVEL LT 7.0%: CPT | Mod: CPTII,S$GLB,, | Performed by: ORTHOPAEDIC SURGERY

## 2023-07-26 PROCEDURE — 99213 PR OFFICE/OUTPT VISIT, EST, LEVL III, 20-29 MIN: ICD-10-PCS | Mod: 25,S$GLB,, | Performed by: ORTHOPAEDIC SURGERY

## 2023-07-26 PROCEDURE — 3044F PR MOST RECENT HEMOGLOBIN A1C LEVEL <7.0%: ICD-10-PCS | Mod: CPTII,S$GLB,, | Performed by: ORTHOPAEDIC SURGERY

## 2023-07-26 PROCEDURE — 1160F RVW MEDS BY RX/DR IN RCRD: CPT | Mod: CPTII,S$GLB,, | Performed by: ORTHOPAEDIC SURGERY

## 2023-07-26 PROCEDURE — 1159F PR MEDICATION LIST DOCUMENTED IN MEDICAL RECORD: ICD-10-PCS | Mod: CPTII,S$GLB,, | Performed by: ORTHOPAEDIC SURGERY

## 2023-07-26 PROCEDURE — 1160F PR REVIEW ALL MEDS BY PRESCRIBER/CLIN PHARMACIST DOCUMENTED: ICD-10-PCS | Mod: CPTII,S$GLB,, | Performed by: ORTHOPAEDIC SURGERY

## 2023-07-26 PROCEDURE — 20610 LARGE JOINT ASPIRATION/INJECTION: L KNEE: ICD-10-PCS | Mod: LT,S$GLB,, | Performed by: ORTHOPAEDIC SURGERY

## 2023-07-26 PROCEDURE — 3008F BODY MASS INDEX DOCD: CPT | Mod: CPTII,S$GLB,, | Performed by: ORTHOPAEDIC SURGERY

## 2023-07-26 PROCEDURE — 3008F PR BODY MASS INDEX (BMI) DOCUMENTED: ICD-10-PCS | Mod: CPTII,S$GLB,, | Performed by: ORTHOPAEDIC SURGERY

## 2023-07-26 PROCEDURE — 1159F MED LIST DOCD IN RCRD: CPT | Mod: CPTII,S$GLB,, | Performed by: ORTHOPAEDIC SURGERY

## 2023-07-26 PROCEDURE — 99213 OFFICE O/P EST LOW 20 MIN: CPT | Mod: 25,S$GLB,, | Performed by: ORTHOPAEDIC SURGERY

## 2023-07-26 RX ORDER — ESCITALOPRAM OXALATE 10 MG/1
10 TABLET ORAL
COMMUNITY
Start: 2023-06-29 | End: 2023-12-26

## 2023-07-26 RX ORDER — SPIRONOLACTONE 25 MG/1
25 TABLET ORAL
COMMUNITY
Start: 2023-06-29 | End: 2023-12-26 | Stop reason: SDUPTHER

## 2023-07-26 RX ORDER — TRIAMCINOLONE ACETONIDE 40 MG/ML
40 INJECTION, SUSPENSION INTRA-ARTICULAR; INTRAMUSCULAR
Status: DISCONTINUED | OUTPATIENT
Start: 2023-07-26 | End: 2023-07-26 | Stop reason: HOSPADM

## 2023-07-26 RX ADMIN — TRIAMCINOLONE ACETONIDE 40 MG: 40 INJECTION, SUSPENSION INTRA-ARTICULAR; INTRAMUSCULAR at 02:07

## 2023-07-26 NOTE — PROGRESS NOTES
Subjective:       Patient ID: Paula Hanna is a 63 y.o. female.    Chief Complaint: Pain of the Left Knee (Left knee pain that has been going on for a while. States that it is off and on. Describes as a burning pain. Painful to walk, stand and rise from a sitting position. )      History of Present Illness    Prior to meeting with the patient I reviewed the medical chart in [facility]. This included reviewing the previous progress notes from our office, review of the patient's last appointment with their primary care provider, review of any visits to the emergency room, and review of any pain management appointments or procedures.   Left knee pain is been going on for many months on the medial aspect denies trauma.  Has tried topical medications unsuccessfully.    Current Medications  Current Outpatient Medications   Medication Sig Dispense Refill    albuterol (PROVENTIL HFA) 90 mcg/actuation inhaler Inhale 2 puffs into the lungs every 4 (four) hours as needed for Wheezing. Rescue 18 g 0    ALPRAZolam (XANAX) 0.5 MG tablet Take 1 tablet (0.5 mg total) by mouth nightly as needed for Anxiety. 30 tablet 1    aspirin (ECOTRIN) 81 MG EC tablet Take 81 mg by mouth once daily.      azelastine (ASTELIN) 137 mcg (0.1 %) nasal spray 1 spray (137 mcg total) by Nasal route 2 (two) times daily. 90 mL 1    estradioL (ESTRACE) 1 MG tablet TAKE 1 TABLET(1 MG) BY MOUTH EVERY DAY 90 tablet 1    fluticasone propionate (FLONASE) 50 mcg/actuation nasal spray 1 spray (50 mcg total) by Each Nostril route once daily. 16 g 5    ibuprofen (ADVIL,MOTRIN) 800 MG tablet TAKE 1 TABLET(800 MG) BY MOUTH THREE TIMES DAILY EVERY 8 HOURS AS NEEDED FOR PAIN 30 tablet 2    levocetirizine (XYZAL) 5 MG tablet Take 1 tablet (5 mg total) by mouth every evening. 90 tablet 1    magnesium oxide (MAG-OX) 400 mg (241.3 mg magnesium) tablet Take 1 tablet (400 mg total) by mouth once daily. 90 tablet 1    metoprolol succinate (TOPROL-XL) 50 MG 24 hr tablet Take 1  tablet (50 mg total) by mouth once daily. 90 tablet 1    pantoprazole (PROTONIX) 40 MG tablet Take 1 tablet (40 mg total) by mouth once daily. 90 tablet 1    polyethylene glycol (GLYCOLAX) 17 gram/dose powder Take 17 g by mouth once daily.   0    EScitalopram oxalate (LEXAPRO) 10 MG tablet Take 10 mg by mouth.      spironolactone (ALDACTONE) 25 MG tablet Take 25 mg by mouth.       No current facility-administered medications for this visit.       Allergies  Review of patient's allergies indicates:   Allergen Reactions    Statins-hmg-coa reductase inhibitors Other (See Comments)     Muscle and joint pain      Milk containing products (dairy)     Naproxen Itching    Penicillins Itching     Pt states can take Keflex    Sulfa (sulfonamide antibiotics) Itching       Past Medical History  Past Medical History:   Diagnosis Date    Anxiety     Back pain     Degenerative disc disease     GERD (gastroesophageal reflux disease)     Heel spur     Hypertension     Hypertrophy of both inferior nasal turbinates     Nasal septal deviation     Psoriasis     Sciatica        Surgical History  Past Surgical History:   Procedure Laterality Date    HYSTERECTOMY  1999    Ileocolectomy  2000        KNEE ARTHROSCOPY W/ DEBRIDEMENT      x 2, right    LAPAROSCOPIC CHOLECYSTECTOMY N/A 5/16/2021    Procedure: CHOLECYSTECTOMY, LAPAROSCOPIC;  Surgeon: Pepe Cool MD;  Location: [facility];  Service: General;  Laterality: N/A;    LUMBAR EPIDURAL INJECTION      TONSILLECTOMY      WRIST SURGERY Right     plate       Family History:   Family History   Problem Relation Age of Onset    Hypertension Father     Cancer Father     Hypertension Mother     Heart disease Mother     Diabetes Brother     Hypertension Brother     Prostate cancer Brother     Diabetes Sister     Hypertension Sister     Ovarian cancer Neg Hx     Breast cancer Neg Hx        Social History:   Social History     Socioeconomic History    Marital status:    Tobacco Use     Smoking status: Former     Packs/day: 0.25     Years: 22.00     Pack years: 5.50     Types: Cigarettes     Quit date: 3/1/2017     Years since quittin.4    Smokeless tobacco: Never    Tobacco comments:     less than 1 pack week   Substance and Sexual Activity    Alcohol use: Yes     Comment: socially    Drug use: No    Sexual activity: Yes     Partners: Male     Birth control/protection: None, Surgical       Hospitalization/Major Diagnostic Procedure:     Review of Systems     General/Constitutional:  Chills denies. Fatigue denies. Fever denies. Weight gain denies. Weight loss denies.    Respiratory:  Shortness of breath denies.    Cardiovascular:  Chest pain denies.    Gastrointestinal:  Constipation denies. Diarrhea denies. Nausea denies. Vomiting denies.     Hematology:  Easy bruising denies. Prolonged bleeding denies.     Genitourinary:  Frequent urination denies. Pain in lower back denies. Painful urination denies.     Musculoskeletal:  See HPI for details    Skin:  Rash denies.    Neurologic:  Dizziness denies. Gait abnormalities denies. Seizures denies. Tingling/Numbess denies.    Psychiatric:  Anxiety denies. Depressed mood denies.     Objective:   Vital Signs: There were no vitals filed for this visit.     Physical Exam      General Examination:     Constitutional: The patient is alert and oriented to lace person and time. Mood is pleasant.     Head/Face: Normal facial features normal eyebrows    Eyes: Normal extraocular motion bilaterally    Lungs: Respirations are equal and unlabored    Gait is coordinated.    Cardiovascular: There are no swelling or varicosities present.    Lymphatic: Negative for adenopathy    Skin: Normal    Neurological: Level of consciousness normal. Oriented to place person and time and situation    Psychiatric: Oriented to time place person and situation    Tibia vera tenderness of the medial joint line and medial tibial plateau full range of motion no swelling no instability  Francesca's negative  XRAY Report/ Interpretation:  AP lateral x-rays left knee were taken today minimal medial joint space narrowing of the left knee moderate joint space narrowing right knee      Assessment:       1. Primary osteoarthritis of left knee        Plan:       Paula was seen today for pain.    Diagnoses and all orders for this visit:    Primary osteoarthritis of left knee  -     X-Ray Knee 1 or 2 View Left  -     Large Joint Aspiration/Injection: L knee         Follow up if symptoms worsen or fail to improve.    Likely early onset medial compartment osteoarthritis.  Treatment options discussed.  A steroid xylocaine injection was given into the left knee without side effects and a compressive dressing applied.  Patient advised to ambulate as tolerated if symptoms continue although resolve she will call back and we will schedule MRI to rule out degenerative medial meniscus tear she agrees with treatment plan  Treatment options were discussed with regards to the nature of the medical condition. Conservative pain intervention and surgical options were discussed in detail. The probability of success of each separate treatment option was discussed. The patient expressed a clear understanding of the treatment options. With regards to surgery, the procedure risk, benefits, complications, and outcomes were discussed. No guarantees were given with regards to surgical outcome.   The risk of complications, morbidity, and mortality of patient management decisions have been made at the time of this visit. These are associated with the patient's problems, diagnostic procedures and treatment options. This includes the possible management options selected and those considered but not selected by the patient after shared medical decision making we discussed with the patient.     This note was created using Dragon voice recognition software that occasionally misinterpreted phrases or words.

## 2023-07-26 NOTE — PROCEDURES
Large Joint Aspiration/Injection: L knee    Date/Time: 7/26/2023 2:00 PM  Performed by: Zach Abdul MD  Authorized by: Zach Abdul MD     Consent Done?:  Yes (Verbal)  Indications:  Pain and arthritis  Site marked: the procedure site was marked    Timeout: prior to procedure the correct patient, procedure, and site was verified    Prep: patient was prepped and draped in usual sterile fashion      Local anesthesia used?: Yes    Local anesthetic:  Lidocaine 1% without epinephrine    Details:  Needle Size:  22 G  Ultrasonic Guidance for needle placement?: No    Location:  Knee  Site:  L knee  Medications:  40 mg triamcinolone acetonide 40 mg/mL  Patient tolerance:  Patient tolerated the procedure well with no immediate complications

## 2023-10-26 RX ORDER — LEVOCETIRIZINE DIHYDROCHLORIDE 5 MG/1
5 TABLET, FILM COATED ORAL NIGHTLY
Qty: 90 TABLET | Refills: 1 | Status: SHIPPED | OUTPATIENT
Start: 2023-10-26 | End: 2023-12-26 | Stop reason: SDUPTHER

## 2023-12-26 ENCOUNTER — OFFICE VISIT (OUTPATIENT)
Dept: FAMILY MEDICINE | Facility: CLINIC | Age: 63
End: 2023-12-26
Payer: COMMERCIAL

## 2023-12-26 VITALS
WEIGHT: 198.94 LBS | SYSTOLIC BLOOD PRESSURE: 138 MMHG | TEMPERATURE: 97 F | DIASTOLIC BLOOD PRESSURE: 82 MMHG | OXYGEN SATURATION: 99 % | BODY MASS INDEX: 35.25 KG/M2 | HEART RATE: 65 BPM | HEIGHT: 63 IN

## 2023-12-26 DIAGNOSIS — K63.5 POLYP OF COLON, UNSPECIFIED PART OF COLON, UNSPECIFIED TYPE: ICD-10-CM

## 2023-12-26 DIAGNOSIS — G47.8 NON-RESTORATIVE SLEEP: ICD-10-CM

## 2023-12-26 DIAGNOSIS — R06.83 SNORING: ICD-10-CM

## 2023-12-26 DIAGNOSIS — J30.9 ALLERGIC RHINITIS, UNSPECIFIED SEASONALITY, UNSPECIFIED TRIGGER: ICD-10-CM

## 2023-12-26 DIAGNOSIS — L40.50 PSORIATIC ARTHRITIS: ICD-10-CM

## 2023-12-26 DIAGNOSIS — Z78.0 MENOPAUSE: ICD-10-CM

## 2023-12-26 DIAGNOSIS — I10 HYPERTENSION, ESSENTIAL: Primary | ICD-10-CM

## 2023-12-26 DIAGNOSIS — R53.83 FATIGUE, UNSPECIFIED TYPE: ICD-10-CM

## 2023-12-26 DIAGNOSIS — Z79.82 ASPIRIN LONG-TERM USE: ICD-10-CM

## 2023-12-26 DIAGNOSIS — M54.2 ARTHRALGIA, CERVICAL SPINE: ICD-10-CM

## 2023-12-26 DIAGNOSIS — K21.9 GASTROESOPHAGEAL REFLUX DISEASE, UNSPECIFIED WHETHER ESOPHAGITIS PRESENT: ICD-10-CM

## 2023-12-26 DIAGNOSIS — Z12.11 COLON CANCER SCREENING: ICD-10-CM

## 2023-12-26 DIAGNOSIS — F41.9 ANXIETY: ICD-10-CM

## 2023-12-26 DIAGNOSIS — E66.01 SEVERE OBESITY (BMI 35.0-39.9) WITH COMORBIDITY: ICD-10-CM

## 2023-12-26 DIAGNOSIS — M25.562 ARTHRALGIA OF LEFT KNEE: ICD-10-CM

## 2023-12-26 PROCEDURE — 3008F PR BODY MASS INDEX (BMI) DOCUMENTED: ICD-10-PCS | Mod: CPTII,S$GLB,, | Performed by: FAMILY MEDICINE

## 2023-12-26 PROCEDURE — 90686 IIV4 VACC NO PRSV 0.5 ML IM: CPT | Mod: S$GLB,,, | Performed by: FAMILY MEDICINE

## 2023-12-26 PROCEDURE — G0008 FLU VACCINE (QUAD) GREATER THAN OR EQUAL TO 3YO PRESERVATIVE FREE IM: ICD-10-PCS | Mod: S$GLB,,, | Performed by: FAMILY MEDICINE

## 2023-12-26 PROCEDURE — 3075F SYST BP GE 130 - 139MM HG: CPT | Mod: CPTII,S$GLB,, | Performed by: FAMILY MEDICINE

## 2023-12-26 PROCEDURE — 3079F PR MOST RECENT DIASTOLIC BLOOD PRESSURE 80-89 MM HG: ICD-10-PCS | Mod: CPTII,S$GLB,, | Performed by: FAMILY MEDICINE

## 2023-12-26 PROCEDURE — 3079F DIAST BP 80-89 MM HG: CPT | Mod: CPTII,S$GLB,, | Performed by: FAMILY MEDICINE

## 2023-12-26 PROCEDURE — 99214 OFFICE O/P EST MOD 30 MIN: CPT | Mod: S$GLB,,, | Performed by: FAMILY MEDICINE

## 2023-12-26 PROCEDURE — 3075F PR MOST RECENT SYSTOLIC BLOOD PRESS GE 130-139MM HG: ICD-10-PCS | Mod: CPTII,S$GLB,, | Performed by: FAMILY MEDICINE

## 2023-12-26 PROCEDURE — 99214 PR OFFICE/OUTPT VISIT, EST, LEVL IV, 30-39 MIN: ICD-10-PCS | Mod: S$GLB,,, | Performed by: FAMILY MEDICINE

## 2023-12-26 PROCEDURE — 3044F HG A1C LEVEL LT 7.0%: CPT | Mod: CPTII,S$GLB,, | Performed by: FAMILY MEDICINE

## 2023-12-26 PROCEDURE — G0008 ADMIN INFLUENZA VIRUS VAC: HCPCS | Mod: S$GLB,,, | Performed by: FAMILY MEDICINE

## 2023-12-26 PROCEDURE — 3044F PR MOST RECENT HEMOGLOBIN A1C LEVEL <7.0%: ICD-10-PCS | Mod: CPTII,S$GLB,, | Performed by: FAMILY MEDICINE

## 2023-12-26 PROCEDURE — 3008F BODY MASS INDEX DOCD: CPT | Mod: CPTII,S$GLB,, | Performed by: FAMILY MEDICINE

## 2023-12-26 PROCEDURE — 90686 FLU VACCINE (QUAD) GREATER THAN OR EQUAL TO 3YO PRESERVATIVE FREE IM: ICD-10-PCS | Mod: S$GLB,,, | Performed by: FAMILY MEDICINE

## 2023-12-26 RX ORDER — IBUPROFEN 800 MG/1
TABLET ORAL
Qty: 30 TABLET | Refills: 2 | Status: SHIPPED | OUTPATIENT
Start: 2023-12-26

## 2023-12-26 RX ORDER — ALPRAZOLAM 0.5 MG/1
0.5 TABLET ORAL NIGHTLY PRN
Qty: 30 TABLET | Refills: 2 | Status: SHIPPED | OUTPATIENT
Start: 2023-12-26 | End: 2024-03-20 | Stop reason: SDUPTHER

## 2023-12-26 RX ORDER — FLUTICASONE PROPIONATE 50 MCG
1 SPRAY, SUSPENSION (ML) NASAL DAILY
Qty: 16 G | Refills: 5 | Status: SHIPPED | OUTPATIENT
Start: 2023-12-26

## 2023-12-26 RX ORDER — METOPROLOL SUCCINATE 50 MG/1
50 TABLET, EXTENDED RELEASE ORAL DAILY
Qty: 90 TABLET | Refills: 1 | OUTPATIENT
Start: 2023-12-26 | End: 2024-03-18

## 2023-12-26 RX ORDER — AZELASTINE 1 MG/ML
1 SPRAY, METERED NASAL 2 TIMES DAILY
Qty: 90 ML | Refills: 1 | Status: SHIPPED | OUTPATIENT
Start: 2023-12-26 | End: 2024-12-25

## 2023-12-26 RX ORDER — LANOLIN ALCOHOL/MO/W.PET/CERES
400 CREAM (GRAM) TOPICAL DAILY
Qty: 90 TABLET | Refills: 1 | Status: SHIPPED | OUTPATIENT
Start: 2023-12-26

## 2023-12-26 RX ORDER — SPIRONOLACTONE 25 MG/1
25 TABLET ORAL DAILY
Qty: 90 TABLET | Refills: 1 | Status: SHIPPED | OUTPATIENT
Start: 2023-12-26

## 2023-12-26 RX ORDER — PANTOPRAZOLE SODIUM 40 MG/1
40 TABLET, DELAYED RELEASE ORAL DAILY
Qty: 90 TABLET | Refills: 1 | Status: SHIPPED | OUTPATIENT
Start: 2023-12-26

## 2023-12-26 RX ORDER — LEVOCETIRIZINE DIHYDROCHLORIDE 5 MG/1
5 TABLET, FILM COATED ORAL NIGHTLY
Qty: 90 TABLET | Refills: 1 | Status: SHIPPED | OUTPATIENT
Start: 2023-12-26

## 2023-12-26 RX ORDER — ESTRADIOL 1 MG/1
TABLET ORAL
Qty: 90 TABLET | Refills: 1 | Status: SHIPPED | OUTPATIENT
Start: 2023-12-26

## 2023-12-27 ENCOUNTER — LAB VISIT (OUTPATIENT)
Dept: LAB | Facility: HOSPITAL | Age: 63
End: 2023-12-27
Attending: FAMILY MEDICINE
Payer: COMMERCIAL

## 2023-12-27 ENCOUNTER — OFFICE VISIT (OUTPATIENT)
Dept: ORTHOPEDICS | Facility: CLINIC | Age: 63
End: 2023-12-27
Payer: COMMERCIAL

## 2023-12-27 VITALS — WEIGHT: 198 LBS | BODY MASS INDEX: 35.08 KG/M2 | HEIGHT: 63 IN

## 2023-12-27 DIAGNOSIS — M17.12 PRIMARY OSTEOARTHRITIS OF LEFT KNEE: Primary | ICD-10-CM

## 2023-12-27 DIAGNOSIS — I10 HYPERTENSION, ESSENTIAL: ICD-10-CM

## 2023-12-27 DIAGNOSIS — M25.562 ARTHRALGIA OF LEFT KNEE: ICD-10-CM

## 2023-12-27 DIAGNOSIS — Z79.82 ASPIRIN LONG-TERM USE: ICD-10-CM

## 2023-12-27 PROBLEM — K63.5 POLYP OF COLON: Status: ACTIVE | Noted: 2023-12-27

## 2023-12-27 PROBLEM — J30.9 ALLERGIC RHINITIS: Status: ACTIVE | Noted: 2023-12-27

## 2023-12-27 PROBLEM — G47.8 NON-RESTORATIVE SLEEP: Status: ACTIVE | Noted: 2023-12-27

## 2023-12-27 LAB
ALBUMIN SERPL BCP-MCNC: 4.3 G/DL (ref 3.5–5.2)
ALP SERPL-CCNC: 76 U/L (ref 55–135)
ALT SERPL W/O P-5'-P-CCNC: 14 U/L (ref 10–44)
ANION GAP SERPL CALC-SCNC: 6 MMOL/L (ref 8–16)
AST SERPL-CCNC: 16 U/L (ref 10–40)
BASOPHILS # BLD AUTO: 0.05 K/UL (ref 0–0.2)
BASOPHILS NFR BLD: 1.1 % (ref 0–1.9)
BILIRUB SERPL-MCNC: 0.6 MG/DL (ref 0.1–1)
BUN SERPL-MCNC: 17 MG/DL (ref 8–23)
CALCIUM SERPL-MCNC: 8.9 MG/DL (ref 8.7–10.5)
CHLORIDE SERPL-SCNC: 107 MMOL/L (ref 95–110)
CO2 SERPL-SCNC: 28 MMOL/L (ref 23–29)
CREAT SERPL-MCNC: 0.9 MG/DL (ref 0.5–1.4)
DIFFERENTIAL METHOD BLD: NORMAL
EOSINOPHIL # BLD AUTO: 0.2 K/UL (ref 0–0.5)
EOSINOPHIL NFR BLD: 3.6 % (ref 0–8)
ERYTHROCYTE [DISTWIDTH] IN BLOOD BY AUTOMATED COUNT: 14 % (ref 11.5–14.5)
EST. GFR  (NO RACE VARIABLE): >60 ML/MIN/1.73 M^2
GLUCOSE SERPL-MCNC: 97 MG/DL (ref 70–110)
HCT VFR BLD AUTO: 38.8 % (ref 37–48.5)
HGB BLD-MCNC: 12.4 G/DL (ref 12–16)
IMM GRANULOCYTES # BLD AUTO: 0.01 K/UL (ref 0–0.04)
IMM GRANULOCYTES NFR BLD AUTO: 0.2 % (ref 0–0.5)
LYMPHOCYTES # BLD AUTO: 1.9 K/UL (ref 1–4.8)
LYMPHOCYTES NFR BLD: 42.5 % (ref 18–48)
MCH RBC QN AUTO: 29 PG (ref 27–31)
MCHC RBC AUTO-ENTMCNC: 32 G/DL (ref 32–36)
MCV RBC AUTO: 91 FL (ref 82–98)
MONOCYTES # BLD AUTO: 0.4 K/UL (ref 0.3–1)
MONOCYTES NFR BLD: 9.2 % (ref 4–15)
NEUTROPHILS # BLD AUTO: 1.9 K/UL (ref 1.8–7.7)
NEUTROPHILS NFR BLD: 43.4 % (ref 38–73)
NRBC BLD-RTO: 0 /100 WBC
PLATELET # BLD AUTO: 208 K/UL (ref 150–450)
PMV BLD AUTO: 10.7 FL (ref 9.2–12.9)
POTASSIUM SERPL-SCNC: 3.9 MMOL/L (ref 3.5–5.1)
PROT SERPL-MCNC: 7 G/DL (ref 6–8.4)
RBC # BLD AUTO: 4.27 M/UL (ref 4–5.4)
SODIUM SERPL-SCNC: 141 MMOL/L (ref 136–145)
URATE SERPL-MCNC: 5.5 MG/DL (ref 2.4–5.7)
WBC # BLD AUTO: 4.45 K/UL (ref 3.9–12.7)

## 2023-12-27 PROCEDURE — 84550 ASSAY OF BLOOD/URIC ACID: CPT | Performed by: FAMILY MEDICINE

## 2023-12-27 PROCEDURE — 20610 LARGE JOINT ASPIRATION/INJECTION: L KNEE: ICD-10-PCS | Mod: LT,S$GLB,, | Performed by: ORTHOPAEDIC SURGERY

## 2023-12-27 PROCEDURE — 80053 COMPREHEN METABOLIC PANEL: CPT | Performed by: FAMILY MEDICINE

## 2023-12-27 PROCEDURE — 3008F PR BODY MASS INDEX (BMI) DOCUMENTED: ICD-10-PCS | Mod: CPTII,S$GLB,, | Performed by: ORTHOPAEDIC SURGERY

## 2023-12-27 PROCEDURE — 1159F PR MEDICATION LIST DOCUMENTED IN MEDICAL RECORD: ICD-10-PCS | Mod: CPTII,S$GLB,, | Performed by: ORTHOPAEDIC SURGERY

## 2023-12-27 PROCEDURE — 99213 OFFICE O/P EST LOW 20 MIN: CPT | Mod: 25,S$GLB,, | Performed by: ORTHOPAEDIC SURGERY

## 2023-12-27 PROCEDURE — 85025 COMPLETE CBC W/AUTO DIFF WBC: CPT | Performed by: FAMILY MEDICINE

## 2023-12-27 PROCEDURE — 3008F BODY MASS INDEX DOCD: CPT | Mod: CPTII,S$GLB,, | Performed by: ORTHOPAEDIC SURGERY

## 2023-12-27 PROCEDURE — 1160F PR REVIEW ALL MEDS BY PRESCRIBER/CLIN PHARMACIST DOCUMENTED: ICD-10-PCS | Mod: CPTII,S$GLB,, | Performed by: ORTHOPAEDIC SURGERY

## 2023-12-27 PROCEDURE — 3044F HG A1C LEVEL LT 7.0%: CPT | Mod: CPTII,S$GLB,, | Performed by: ORTHOPAEDIC SURGERY

## 2023-12-27 PROCEDURE — 3044F PR MOST RECENT HEMOGLOBIN A1C LEVEL <7.0%: ICD-10-PCS | Mod: CPTII,S$GLB,, | Performed by: ORTHOPAEDIC SURGERY

## 2023-12-27 PROCEDURE — 1159F MED LIST DOCD IN RCRD: CPT | Mod: CPTII,S$GLB,, | Performed by: ORTHOPAEDIC SURGERY

## 2023-12-27 PROCEDURE — 20610 DRAIN/INJ JOINT/BURSA W/O US: CPT | Mod: LT,S$GLB,, | Performed by: ORTHOPAEDIC SURGERY

## 2023-12-27 PROCEDURE — 36415 COLL VENOUS BLD VENIPUNCTURE: CPT | Performed by: FAMILY MEDICINE

## 2023-12-27 PROCEDURE — 1160F RVW MEDS BY RX/DR IN RCRD: CPT | Mod: CPTII,S$GLB,, | Performed by: ORTHOPAEDIC SURGERY

## 2023-12-27 PROCEDURE — 99213 PR OFFICE/OUTPT VISIT, EST, LEVL III, 20-29 MIN: ICD-10-PCS | Mod: 25,S$GLB,, | Performed by: ORTHOPAEDIC SURGERY

## 2023-12-27 RX ORDER — TRIAMCINOLONE ACETONIDE 40 MG/ML
40 INJECTION, SUSPENSION INTRA-ARTICULAR; INTRAMUSCULAR
Status: DISCONTINUED | OUTPATIENT
Start: 2023-12-27 | End: 2023-12-27 | Stop reason: HOSPADM

## 2023-12-27 RX ADMIN — TRIAMCINOLONE ACETONIDE 40 MG: 40 INJECTION, SUSPENSION INTRA-ARTICULAR; INTRAMUSCULAR at 10:12

## 2023-12-27 NOTE — PROCEDURES
Large Joint Aspiration/Injection: L knee    Date/Time: 12/27/2023 10:30 AM    Performed by: Zach Abdul MD  Authorized by: Zach Abdul MD    Consent Done?:  Yes (Verbal)  Indications:  Pain  Site marked: the procedure site was marked    Timeout: prior to procedure the correct patient, procedure, and site was verified    Prep: patient was prepped and draped in usual sterile fashion      Local anesthesia used?: Yes    Local anesthetic:  Lidocaine 1% without epinephrine    Details:  Needle Size:  22 G  Ultrasonic Guidance for needle placement?: No    Approach:  Anterolateral  Location:  Knee  Site:  L knee  Medications:  40 mg triamcinolone acetonide 40 mg/mL  Patient tolerance:  Patient tolerated the procedure well with no immediate complications

## 2023-12-27 NOTE — PROGRESS NOTES
Subjective:       Patient ID: Paula Hanna is a 63 y.o. female.    Chief Complaint: Pain of the Left Knee (Left knee pain started back in Oct/Nov after injection on 7/26/23. The pain is like a burning throbbing pain. Some swelling in medial side of the knee. Open to another injection)      History of Present Illness    Prior to meeting with the patient I reviewed the medical chart in [facility]. This included reviewing the previous progress notes from our office, review of the patient's last appointment with their primary care provider, review of any visits to the emergency room, and review of any pain management appointments or procedures.   Follow-up of chronic left knee pain was given a steroid injection in July which worked until October pain has returned she is interested in another injection.    Current Medications  Current Outpatient Medications   Medication Sig Dispense Refill    ALPRAZolam (XANAX) 0.5 MG tablet Take 1 tablet (0.5 mg total) by mouth nightly as needed for Anxiety. 30 tablet 2    aspirin (ECOTRIN) 81 MG EC tablet Take 81 mg by mouth once daily.      azelastine (ASTELIN) 137 mcg (0.1 %) nasal spray 1 spray (137 mcg total) by Nasal route 2 (two) times daily. 90 mL 1    estradioL (ESTRACE) 1 MG tablet TAKE 1 TABLET(1 MG) BY MOUTH EVERY DAY 90 tablet 1    fluticasone propionate (FLONASE) 50 mcg/actuation nasal spray 1 spray (50 mcg total) by Each Nostril route once daily. 16 g 5    ibuprofen (ADVIL,MOTRIN) 800 MG tablet TAKE 1 TABLET(800 MG) BY MOUTH THREE TIMES DAILY EVERY 8 HOURS AS NEEDED FOR PAIN 30 tablet 2    levocetirizine (XYZAL) 5 MG tablet Take 1 tablet (5 mg total) by mouth every evening. 90 tablet 1    magnesium oxide (MAG-OX) 400 mg (241.3 mg magnesium) tablet Take 1 tablet (400 mg total) by mouth once daily. 90 tablet 1    metoprolol succinate (TOPROL-XL) 50 MG 24 hr tablet Take 1 tablet (50 mg total) by mouth once daily. 90 tablet 1    pantoprazole (PROTONIX) 40 MG tablet Take 1  tablet (40 mg total) by mouth once daily. 90 tablet 1    polyethylene glycol (GLYCOLAX) 17 gram/dose powder Take 17 g by mouth once daily.   0    spironolactone (ALDACTONE) 25 MG tablet Take 1 tablet (25 mg total) by mouth once daily. 90 tablet 1    albuterol (PROVENTIL HFA) 90 mcg/actuation inhaler Inhale 2 puffs into the lungs every 4 (four) hours as needed for Wheezing. Rescue 18 g 0     No current facility-administered medications for this visit.       Allergies  Review of patient's allergies indicates:   Allergen Reactions    Statins-hmg-coa reductase inhibitors Other (See Comments)     Muscle and joint pain      Milk containing products (dairy)     Naproxen Itching    Penicillins Itching     Pt states can take Keflex    Sulfa (sulfonamide antibiotics) Itching       Past Medical History  Past Medical History:   Diagnosis Date    Anxiety     Back pain     Degenerative disc disease     GERD (gastroesophageal reflux disease)     Heel spur     Hypertension     Hypertrophy of both inferior nasal turbinates     Nasal septal deviation     Psoriasis     Sciatica        Surgical History  Past Surgical History:   Procedure Laterality Date    HYSTERECTOMY  1999    Ileocolectomy  2000        KNEE ARTHROSCOPY W/ DEBRIDEMENT      x 2, right    LAPAROSCOPIC CHOLECYSTECTOMY N/A 5/16/2021    Procedure: CHOLECYSTECTOMY, LAPAROSCOPIC;  Surgeon: Pepe Cool MD;  Location: [facility];  Service: General;  Laterality: N/A;    LUMBAR EPIDURAL INJECTION      TONSILLECTOMY      WRIST SURGERY Right     plate       Family History:   Family History   Problem Relation Age of Onset    Hypertension Father     Cancer Father     Hypertension Mother     Heart disease Mother     Diabetes Brother     Hypertension Brother     Prostate cancer Brother     Diabetes Sister     Hypertension Sister     Ovarian cancer Neg Hx     Breast cancer Neg Hx        Social History:   Social History     Socioeconomic History    Marital status:     Tobacco Use    Smoking status: Former     Current packs/day: 0.00     Average packs/day: 0.3 packs/day for 22.0 years (5.5 ttl pk-yrs)     Types: Cigarettes     Start date: 3/1/1995     Quit date: 3/1/2017     Years since quittin.8    Smokeless tobacco: Never    Tobacco comments:     less than 1 pack week   Substance and Sexual Activity    Alcohol use: Yes     Comment: socially    Drug use: No    Sexual activity: Yes     Partners: Male     Birth control/protection: None, Surgical     Social Determinants of Health     Stress: No Stress Concern Present (2020)    [employer] [employer] - Occupational Stress Questionnaire     Feeling of Stress : Not at all       Hospitalization/Major Diagnostic Procedure:     Review of Systems     General/Constitutional:  Chills denies. Fatigue denies. Fever denies. Weight gain denies. Weight loss denies.    Respiratory:  Shortness of breath denies.    Cardiovascular:  Chest pain denies.    Gastrointestinal:  Constipation denies. Diarrhea denies. Nausea denies. Vomiting denies.     Hematology:  Easy bruising denies. Prolonged bleeding denies.     Genitourinary:  Frequent urination denies. Pain in lower back denies. Painful urination denies.     Musculoskeletal:  See HPI for details    Skin:  Rash denies.    Neurologic:  Dizziness denies. Gait abnormalities denies. Seizures denies. Tingling/Numbess denies.    Psychiatric:  Anxiety denies. Depressed mood denies.     Objective:   Vital Signs: There were no vitals filed for this visit.     Physical Exam      General Examination:     Constitutional: The patient is alert and oriented to lace person and time. Mood is pleasant.     Head/Face: Normal facial features normal eyebrows    Eyes: Normal extraocular motion bilaterally    Lungs: Respirations are equal and unlabored    Gait is coordinated.    Cardiovascular: There are no swelling or varicosities present.    Lymphatic: Negative for adenopathy    Skin:  Normal    Neurological: Level of consciousness normal. Oriented to place person and time and situation    Psychiatric: Oriented to time place person and situation    Tender over the medial joint line crepitance no swelling pain with valgus stress test  XRAY Report/ Interpretation:  Prior x-rays reviewed      Assessment:       1. Primary osteoarthritis of left knee        Plan:       Paula was seen today for pain.    Diagnoses and all orders for this visit:    Primary osteoarthritis of left knee         Follow up in about 3 months (around 3/27/2024).    To the and a sterile conditions 40 mg Kenalog and xylocaine mixture was injected into left knee joint under sterile conditions without any side effects noted patient advised activity as tolerated return 3 months with standing x-rays left knee  Treatment options were discussed with regards to the nature of the medical condition. Conservative pain intervention and surgical options were discussed in detail. The probability of success of each separate treatment option was discussed. The patient expressed a clear understanding of the treatment options. With regards to surgery, the procedure risk, benefits, complications, and outcomes were discussed. No guarantees were given with regards to surgical outcome.   The risk of complications, morbidity, and mortality of patient management decisions have been made at the time of this visit. These are associated with the patient's problems, diagnostic procedures and treatment options. This includes the possible management options selected and those considered but not selected by the patient after shared medical decision making we discussed with the patient.     This note was created using Dragon voice recognition software that occasionally misinterpreted phrases or words.

## 2023-12-29 PROBLEM — E66.01 SEVERE OBESITY (BMI 35.0-39.9) WITH COMORBIDITY: Status: ACTIVE | Noted: 2023-12-29

## 2023-12-29 NOTE — PROGRESS NOTES
Subjective:       Patient ID: Paula Hanna is a 63 y.o. female.    Chief Complaint: Medication Refill    On estrogen replacement.  Hypertension is controlled.  No chest pain or palpitations.  Using her aspirin.  Gastroesophageal reflux disease needs refill of this.  Allergic rhinitis Xyzal Flonase and Astelin being used.  Cervical arthralgia.  Arthralgia of her left knee.  Anxiety issues.  Xanax p.r.n..  Maximum about 3 per week.  Psoriatic arthritis no medications for this.  Colon cancer screening due in July of 2024.  History of colon polyps.  Snoring fatigue and non restorative sleeps needs sleep study.  Seeing orthopedics tomorrow regarding her knee.  No chest pain palpitations PND orthopnea no cough or sputum.    Physical examination.  Vital signs noted.  Neck without bruit.  Chest clear.  Heart regular rate rhythm.  Abdomen bowel sounds are positive soft nontender no guarding or rebound.  Extremities without edema positive pedal pulses.  Left lateral ankle is slightly swollen only.        Objective:        Assessment:       1. Hypertension, essential    2. Gastroesophageal reflux disease, unspecified whether esophagitis present    3. Aspirin long-term use    4. Allergic rhinitis, unspecified seasonality, unspecified trigger    5. Arthralgia, cervical spine    6. Anxiety    7. Psoriatic arthritis    8. Colon cancer screening    9. Polyp of colon, unspecified part of colon, unspecified type    10. Fatigue, unspecified type    11. Snoring    12. Arthralgia of left knee    13. Non-restorative sleep    14. Menopause    15. BMI 35.0-35.9,adult    16. Severe obesity (BMI 35.0-39.9) with comorbidity        Plan:       Hypertension, essential  -     Comprehensive Metabolic Panel; Future; Expected date: 12/26/2023    Gastroesophageal reflux disease, unspecified whether esophagitis present    Aspirin long-term use  -     CBC Auto Differential; Future; Expected date: 12/26/2023    Allergic rhinitis, unspecified  seasonality, unspecified trigger    Arthralgia, cervical spine    Anxiety    Psoriatic arthritis    Colon cancer screening    Polyp of colon, unspecified part of colon, unspecified type    Fatigue, unspecified type  -     Home Sleep Study; Future    Snoring  -     Home Sleep Study; Future    Arthralgia of left knee  -     Uric Acid; Future; Expected date: 12/26/2023    Non-restorative sleep  -     Home Sleep Study; Future    Menopause    BMI 35.0-35.9,adult    Severe obesity (BMI 35.0-39.9) with comorbidity    Other orders  -     estradioL (ESTRACE) 1 MG tablet; TAKE 1 TABLET(1 MG) BY MOUTH EVERY DAY  Dispense: 90 tablet; Refill: 1  -     ibuprofen (ADVIL,MOTRIN) 800 MG tablet; TAKE 1 TABLET(800 MG) BY MOUTH THREE TIMES DAILY EVERY 8 HOURS AS NEEDED FOR PAIN  Dispense: 30 tablet; Refill: 2  -     ALPRAZolam (XANAX) 0.5 MG tablet; Take 1 tablet (0.5 mg total) by mouth nightly as needed for Anxiety.  Dispense: 30 tablet; Refill: 2  -     azelastine (ASTELIN) 137 mcg (0.1 %) nasal spray; 1 spray (137 mcg total) by Nasal route 2 (two) times daily.  Dispense: 90 mL; Refill: 1  -     fluticasone propionate (FLONASE) 50 mcg/actuation nasal spray; 1 spray (50 mcg total) by Each Nostril route once daily.  Dispense: 16 g; Refill: 5  -     metoprolol succinate (TOPROL-XL) 50 MG 24 hr tablet; Take 1 tablet (50 mg total) by mouth once daily.  Dispense: 90 tablet; Refill: 1  -     pantoprazole (PROTONIX) 40 MG tablet; Take 1 tablet (40 mg total) by mouth once daily.  Dispense: 90 tablet; Refill: 1  -     magnesium oxide (MAG-OX) 400 mg (241.3 mg magnesium) tablet; Take 1 tablet (400 mg total) by mouth once daily.  Dispense: 90 tablet; Refill: 1  -     levocetirizine (XYZAL) 5 MG tablet; Take 1 tablet (5 mg total) by mouth every evening.  Dispense: 90 tablet; Refill: 1  -     spironolactone (ALDACTONE) 25 MG tablet; Take 1 tablet (25 mg total) by mouth once daily.  Dispense: 90 tablet; Refill: 1  -     Influenza - Quadrivalent  (PF)    Refill all of her medications.  Flu shot.  RSV recommended.  Needs home sleep study.  Follow-up in 6 months.  CBC CMP and uric acid ordered.

## 2024-01-12 ENCOUNTER — TELEPHONE (OUTPATIENT)
Dept: FAMILY MEDICINE | Facility: CLINIC | Age: 64
End: 2024-01-12
Payer: MEDICARE

## 2024-01-12 NOTE — TELEPHONE ENCOUNTER
----- Message from Aristides Griffin sent at 1/12/2024  8:10 AM CST -----  Regarding: results  Contact: patient  Type:  Patient Returning Call    Who Called:patient  Who Left Message for Patient:office nurse  Does the patient know what this is regarding?:results  Would the patient rather a call back or a response via [facility]? Please call to advise  Best Call Back Number:[phone]  Additional Information:

## 2024-01-17 DIAGNOSIS — Z78.0 MENOPAUSE: ICD-10-CM

## 2024-02-21 ENCOUNTER — HOSPITAL ENCOUNTER (OUTPATIENT)
Dept: RADIOLOGY | Facility: CLINIC | Age: 64
Discharge: HOME | End: 2024-02-21
Attending: FAMILY MEDICINE
Payer: COMMERCIAL

## 2024-02-21 DIAGNOSIS — Z78.0 MENOPAUSE: ICD-10-CM

## 2024-02-21 PROCEDURE — 77080 DXA BONE DENSITY AXIAL: CPT | Mod: TC,PO

## 2024-02-21 PROCEDURE — 77080 DXA BONE DENSITY AXIAL: CPT | Mod: 26,,, | Performed by: RADIOLOGY

## 2024-03-12 ENCOUNTER — OFFICE VISIT (OUTPATIENT)
Dept: FAMILY MEDICINE | Facility: CLINIC | Age: 64
End: 2024-03-12
Payer: MEDICARE

## 2024-03-12 VITALS
OXYGEN SATURATION: 95 % | BODY MASS INDEX: 34.57 KG/M2 | SYSTOLIC BLOOD PRESSURE: 138 MMHG | HEIGHT: 63 IN | WEIGHT: 195.13 LBS | DIASTOLIC BLOOD PRESSURE: 86 MMHG | TEMPERATURE: 98 F | HEART RATE: 76 BPM

## 2024-03-12 DIAGNOSIS — Z12.31 BREAST CANCER SCREENING BY MAMMOGRAM: ICD-10-CM

## 2024-03-12 DIAGNOSIS — Z79.82 ASPIRIN LONG-TERM USE: ICD-10-CM

## 2024-03-12 DIAGNOSIS — J32.9 SINUSITIS, UNSPECIFIED CHRONICITY, UNSPECIFIED LOCATION: ICD-10-CM

## 2024-03-12 DIAGNOSIS — M54.2 ARTHRALGIA, CERVICAL SPINE: Primary | ICD-10-CM

## 2024-03-12 DIAGNOSIS — L40.50 PSORIATIC ARTHRITIS: ICD-10-CM

## 2024-03-12 DIAGNOSIS — E66.01 SEVERE OBESITY (BMI 35.0-39.9) WITH COMORBIDITY: ICD-10-CM

## 2024-03-12 DIAGNOSIS — J40 BRONCHITIS: ICD-10-CM

## 2024-03-12 DIAGNOSIS — I10 HYPERTENSION, ESSENTIAL: ICD-10-CM

## 2024-03-12 LAB
CTP QC/QA: YES
SARS-COV-2 RDRP RESP QL NAA+PROBE: NEGATIVE

## 2024-03-12 PROCEDURE — 99999 PR PBB SHADOW E&M-EST. PATIENT-LVL V: CPT | Mod: PBBFAC,,, | Performed by: FAMILY MEDICINE

## 2024-03-12 PROCEDURE — 99214 OFFICE O/P EST MOD 30 MIN: CPT | Mod: S$GLB,,, | Performed by: FAMILY MEDICINE

## 2024-03-12 PROCEDURE — G2211 COMPLEX E/M VISIT ADD ON: HCPCS | Mod: S$GLB,,, | Performed by: FAMILY MEDICINE

## 2024-03-12 PROCEDURE — 87635 SARS-COV-2 COVID-19 AMP PRB: CPT | Mod: QW,S$GLB,, | Performed by: FAMILY MEDICINE

## 2024-03-12 RX ORDER — TRAMADOL HYDROCHLORIDE 50 MG/1
50 TABLET ORAL
COMMUNITY
Start: 2023-10-02 | End: 2024-03-20

## 2024-03-12 RX ORDER — DOXYCYCLINE 100 MG/1
100 CAPSULE ORAL EVERY 12 HOURS
Qty: 20 CAPSULE | Refills: 0 | Status: SHIPPED | OUTPATIENT
Start: 2024-03-12 | End: 2024-04-11

## 2024-03-12 RX ORDER — ESCITALOPRAM OXALATE 10 MG/1
10 TABLET ORAL DAILY
COMMUNITY
Start: 2024-01-08 | End: 2024-06-11

## 2024-03-12 RX ORDER — PREDNISONE 20 MG/1
TABLET ORAL
Qty: 10 TABLET | Refills: 0 | Status: SHIPPED | OUTPATIENT
Start: 2024-03-12 | End: 2024-03-20

## 2024-03-15 NOTE — PROGRESS NOTES
Subjective:       Patient ID: Paula Hanna is a 64 y.o. female.    Chief Complaint: Neck Pain    Cervical arthralgia.  Pain left occipital area yesterday.  Ibuprofen p.r.n..  Hypertension is controlled.  Cardiovascular no chest pain or palpitations.  Bronchitis symptoms for few days.  Cleaned out a shed.  Also sinus infection pain pressure.  Using aspirin.  BMI of 34.  COVID testing here is negative.      Physical examination.  Vital signs noted.  Tender left posterior neck increased by rotation.  Neck without adenopathy.  Chest is clear to auscultation.  Heart regular rate rhythm without murmur gallop.  Extremities without edema.        Objective:        Assessment:       1. Arthralgia, cervical spine    2. Hypertension, essential    3. Bronchitis    4. Sinusitis, unspecified chronicity, unspecified location    5. Aspirin long-term use    6. BMI 34.0-34.9,adult    7. Breast cancer screening by mammogram    8. Severe obesity (BMI 35.0-39.9) with comorbidity    9. Psoriatic arthritis        Plan:       Arthralgia, cervical spine    Hypertension, essential    Bronchitis  -     POCT COVID-19 Rapid Screening    Sinusitis, unspecified chronicity, unspecified location  -     POCT COVID-19 Rapid Screening    Aspirin long-term use    BMI 34.0-34.9,adult    Breast cancer screening by mammogram  -     Mammo Digital Screening Bilat; Future; Expected date: 03/12/2024    Severe obesity (BMI 35.0-39.9) with comorbidity    Psoriatic arthritis    Other orders  -     predniSONE (DELTASONE) 20 MG tablet; 2 tabs daily for 5 days  Dispense: 10 tablet; Refill: 0  -     doxycycline (VIBRAMYCIN) 100 MG Cap; Take 1 capsule (100 mg total) by mouth every 12 (twelve) hours.  Dispense: 20 capsule; Refill: 0    Mammogram ordered.  Prednisone 20 mg 2 a day for 5 days.  Vibramycin 100 mg b.i.d. for 10 days.  Prevnar 20 will come back in a couple of weeks.  Delsym p.r.n. for cough.  Same blood pressure medications.  Continue her aspirin.

## 2024-03-16 ENCOUNTER — NURSE TRIAGE (OUTPATIENT)
Dept: ADMINISTRATIVE | Facility: CLINIC | Age: 64
End: 2024-03-16
Payer: MEDICARE

## 2024-03-16 ENCOUNTER — HOSPITAL ENCOUNTER (EMERGENCY)
Facility: HOSPITAL | Age: 64
Discharge: HOME | End: 2024-03-16
Attending: EMERGENCY MEDICINE
Payer: MEDICARE

## 2024-03-16 VITALS
HEART RATE: 60 BPM | WEIGHT: 190 LBS | OXYGEN SATURATION: 100 % | SYSTOLIC BLOOD PRESSURE: 184 MMHG | DIASTOLIC BLOOD PRESSURE: 81 MMHG | BODY MASS INDEX: 33.66 KG/M2 | RESPIRATION RATE: 16 BRPM | TEMPERATURE: 98 F

## 2024-03-16 DIAGNOSIS — M54.2 NECK PAIN: Primary | ICD-10-CM

## 2024-03-16 DIAGNOSIS — I10 HYPERTENSION: ICD-10-CM

## 2024-03-16 LAB
ALBUMIN SERPL BCP-MCNC: 4.2 G/DL (ref 3.5–5.2)
ALP SERPL-CCNC: 68 U/L (ref 55–135)
ALT SERPL W/O P-5'-P-CCNC: 11 U/L (ref 10–44)
ANION GAP SERPL CALC-SCNC: 6 MMOL/L (ref 8–16)
AST SERPL-CCNC: 6 U/L (ref 10–40)
BASOPHILS # BLD AUTO: 0.04 K/UL (ref 0–0.2)
BASOPHILS NFR BLD: 0.3 % (ref 0–1.9)
BILIRUB SERPL-MCNC: 0.6 MG/DL (ref 0.1–1)
BUN SERPL-MCNC: 22 MG/DL (ref 8–23)
CALCIUM SERPL-MCNC: 9.3 MG/DL (ref 8.7–10.5)
CHLORIDE SERPL-SCNC: 104 MMOL/L (ref 95–110)
CO2 SERPL-SCNC: 30 MMOL/L (ref 23–29)
CREAT SERPL-MCNC: 1 MG/DL (ref 0.5–1.4)
DIFFERENTIAL METHOD BLD: ABNORMAL
EOSINOPHIL # BLD AUTO: 0 K/UL (ref 0–0.5)
EOSINOPHIL NFR BLD: 0.2 % (ref 0–8)
ERYTHROCYTE [DISTWIDTH] IN BLOOD BY AUTOMATED COUNT: 15.1 % (ref 11.5–14.5)
EST. GFR  (NO RACE VARIABLE): >60 ML/MIN/1.73 M^2
GLUCOSE SERPL-MCNC: 92 MG/DL (ref 70–110)
HCT VFR BLD AUTO: 38.9 % (ref 37–48.5)
HGB BLD-MCNC: 12.7 G/DL (ref 12–16)
IMM GRANULOCYTES # BLD AUTO: 0.05 K/UL (ref 0–0.04)
IMM GRANULOCYTES NFR BLD AUTO: 0.4 % (ref 0–0.5)
LYMPHOCYTES # BLD AUTO: 4.6 K/UL (ref 1–4.8)
LYMPHOCYTES NFR BLD: 37.7 % (ref 18–48)
MCH RBC QN AUTO: 29.2 PG (ref 27–31)
MCHC RBC AUTO-ENTMCNC: 32.6 G/DL (ref 32–36)
MCV RBC AUTO: 89 FL (ref 82–98)
MONOCYTES # BLD AUTO: 0.8 K/UL (ref 0.3–1)
MONOCYTES NFR BLD: 6.6 % (ref 4–15)
NEUTROPHILS # BLD AUTO: 6.7 K/UL (ref 1.8–7.7)
NEUTROPHILS NFR BLD: 54.8 % (ref 38–73)
NRBC BLD-RTO: 0 /100 WBC
PLATELET # BLD AUTO: 236 K/UL (ref 150–450)
PLATELET BLD QL SMEAR: ABNORMAL
PMV BLD AUTO: 10.4 FL (ref 9.2–12.9)
POTASSIUM SERPL-SCNC: 3.5 MMOL/L (ref 3.5–5.1)
PROT SERPL-MCNC: 7 G/DL (ref 6–8.4)
RBC # BLD AUTO: 4.35 M/UL (ref 4–5.4)
SODIUM SERPL-SCNC: 140 MMOL/L (ref 136–145)
WBC # BLD AUTO: 12.19 K/UL (ref 3.9–12.7)

## 2024-03-16 PROCEDURE — 93005 ELECTROCARDIOGRAM TRACING: CPT | Performed by: GENERAL PRACTICE

## 2024-03-16 PROCEDURE — 99284 EMERGENCY DEPT VISIT MOD MDM: CPT

## 2024-03-16 PROCEDURE — 80053 COMPREHEN METABOLIC PANEL: CPT | Performed by: EMERGENCY MEDICINE

## 2024-03-16 PROCEDURE — 85025 COMPLETE CBC W/AUTO DIFF WBC: CPT | Performed by: EMERGENCY MEDICINE

## 2024-03-16 PROCEDURE — 25000003 PHARM REV CODE 250: Performed by: EMERGENCY MEDICINE

## 2024-03-16 PROCEDURE — 93010 ELECTROCARDIOGRAM REPORT: CPT | Mod: ,,, | Performed by: GENERAL PRACTICE

## 2024-03-16 RX ORDER — CLONIDINE HYDROCHLORIDE 0.1 MG/1
0.1 TABLET ORAL
Status: COMPLETED | OUTPATIENT
Start: 2024-03-16 | End: 2024-03-16

## 2024-03-16 RX ORDER — METHOCARBAMOL 500 MG/1
500 TABLET, FILM COATED ORAL 3 TIMES DAILY PRN
Qty: 15 TABLET | Refills: 0 | Status: SHIPPED | OUTPATIENT
Start: 2024-03-16 | End: 2024-03-27

## 2024-03-16 RX ADMIN — CLONIDINE HYDROCHLORIDE 0.1 MG: 0.1 TABLET ORAL at 04:03

## 2024-03-16 NOTE — ED PROVIDER NOTES
Encounter Date: 3/16/2024       History     Chief Complaint   Patient presents with    Hypertension     Reports elevated sbp >200 at home     Patient reports history of hypotensive.  She is compliant with metoprolol 50 mg every night.  Patient with recent diagnosis of left-sided neck pain with possible radiculopathy and sinusitis.  She is currently taking prednisone and antibiotics.  Patient is taking over-the-counter antihistamines with no decongestant.  Patient reports her blood pressure was elevated with systolic blood pressure over 200 last night.  She has no chest pain shortness breath.  No fever chills.      Review of patient's allergies indicates:   Allergen Reactions    Statins-hmg-coa reductase inhibitors Other (See Comments)     Muscle and joint pain      Milk containing products (dairy)     Naproxen Itching    Penicillins Itching     Pt states can take Keflex    Sulfa (sulfonamide antibiotics) Itching     Past Medical History:   Diagnosis Date    Anxiety     Back pain     Degenerative disc disease     GERD (gastroesophageal reflux disease)     Heel spur     Hypertension     Hypertrophy of both inferior nasal turbinates     Nasal septal deviation     Psoriasis     Sciatica      Past Surgical History:   Procedure Laterality Date    HYSTERECTOMY  1999    Ileocolectomy  2000        KNEE ARTHROSCOPY W/ DEBRIDEMENT      x 2, right    LAPAROSCOPIC CHOLECYSTECTOMY N/A 5/16/2021    Procedure: CHOLECYSTECTOMY, LAPAROSCOPIC;  Surgeon: Pepe Cool MD;  Location: [facility];  Service: General;  Laterality: N/A;    LUMBAR EPIDURAL INJECTION      TONSILLECTOMY      WRIST SURGERY Right     plate     Family History   Problem Relation Age of Onset    Hypertension Father     Cancer Father     Hypertension Mother     Heart disease Mother     Diabetes Brother     Hypertension Brother     Prostate cancer Brother     Diabetes Sister     Hypertension Sister     Ovarian cancer Neg Hx     Breast cancer Neg Hx      Social  History     Tobacco Use    Smoking status: Former     Current packs/day: 0.00     Average packs/day: 0.3 packs/day for 22.0 years (5.5 ttl pk-yrs)     Types: Cigarettes     Start date: 3/1/1995     Quit date: 3/1/2017     Years since quittin.0    Smokeless tobacco: Never    Tobacco comments:     less than 1 pack week   Substance Use Topics    Alcohol use: Yes     Comment: socially    Drug use: No     Review of Systems   Constitutional:  Negative for chills and fever.   HENT:  Positive for congestion.    Eyes:  Negative for visual disturbance.   Respiratory:  Negative for shortness of breath.    Cardiovascular:  Negative for chest pain and palpitations.   Gastrointestinal:  Negative for abdominal pain and vomiting.   Genitourinary:  Negative for dysuria.   Musculoskeletal:  Negative for joint swelling.   Neurological:  Negative for headaches.   Psychiatric/Behavioral:  Negative for confusion.        Physical Exam     Initial Vitals [24 1508]   BP Pulse Resp Temp SpO2   (!) 214/104 65 18 98.1 °F (36.7 °C) 97 %      MAP       --         Physical Exam    Nursing note and vitals reviewed.  Constitutional: She is not diaphoretic. No distress.   HENT:   Head: Normocephalic and atraumatic.   Eyes: Conjunctivae are normal.   Neck:   Normal range of motion.  Cardiovascular:  Normal rate.           Pulmonary/Chest: Breath sounds normal.   Abdominal: Abdomen is soft. There is no abdominal tenderness.   Musculoskeletal:         General: Normal range of motion.      Cervical back: Normal range of motion.      Comments: There is some tenderness to left lower paracervical and left trapezius area.  Palpation reproduces pain exactly.  Both arms with full strength and sensation.  No arm swelling.  2+ pulses bilaterally.     Neurological: She is alert and oriented to person, place, and time. She has normal strength. No cranial nerve deficit or sensory deficit.   No gross deficits   Skin: No rash noted.   Psychiatric: She has  a normal mood and affect.         ED Course   Procedures  Labs Reviewed   CBC W/ AUTO DIFFERENTIAL - Abnormal; Notable for the following components:       Result Value    RDW 15.1 (*)     Immature Grans (Abs) 0.05 (*)     All other components within normal limits   COMPREHENSIVE METABOLIC PANEL - Abnormal; Notable for the following components:    CO2 30 (*)     AST 6 (*)     Anion Gap 6 (*)     All other components within normal limits        ECG Results              EKG 12-lead (In process)        Collection Time Result Time QRS Duration OHS QTC Calculation    03/16/24 16:16:46 03/16/24 16:52:33 144 468                     In process by Interface, Lab In [facility] (03/16/24 16:52:39)                   Narrative:    Test Reason : I10,    Vent. Rate : 056 BPM     Atrial Rate : 056 BPM     P-R Int : 142 ms          QRS Dur : 144 ms      QT Int : 486 ms       P-R-T Axes : 065 -02 040 degrees     QTc Int : 468 ms    Sinus bradycardia  Right bundle branch block  Abnormal ECG  When compared with ECG of 10-OCT-2022 12:38,  No significant change was found    Referred By: [email or web address]   SELF           Confirmed By:                                   Imaging Results    None          Medications   cloNIDine tablet 0.1 mg (0.1 mg Oral Given 3/16/24 1623)     Medical Decision Making  Patient presents with asymptomatic hypertension.  Differential diagnosis includes hypertensive emergency, medication adverse effect, end-organ damage.  Original blood pressure 214/104.  At time of discharge blood pressure 181/83.  Patient continues to be asymptomatic.  CBC and CMP unremarkable.  EKG with ventricular rate of 56, sinus rhythm, right bundle-branch block.  Old EKGs reviewed.  Patient did have history of right bundle-branch block.  Will hold prednisone.  Will begin Robaxin for musculoskeletal neck pain.  Patient follow with her doctor.    Amount and/or Complexity of Data Reviewed  Labs: ordered. Decision-making details documented in ED  Course.  ECG/medicine tests:  Decision-making details documented in ED Course.    Risk  Prescription drug management.                                      Clinical Impression:  Final diagnoses:  [I10] Hypertension  [M54.2] Neck pain (Primary)          ED Disposition Condition    Discharge Stable          ED Prescriptions       Medication Sig Dispense Start Date End Date Auth. Provider    methocarbamoL (ROBAXIN) 500 MG Tab Take 1 tablet (500 mg total) by mouth 3 (three) times daily as needed (Pain). 15 tablet 3/16/2024 3/26/2024 Yoan Han MD          Follow-up Information       Follow up With Specialties Details Why Contact Info Additional Information    [facility] - Emergency Dept Emergency Medicine  If symptoms worsen [address] [id number] [phone] 1st floor    Tanvir Cisneros III, MD Family Medicine In 2 days For blood pressure check and further evaluation [address]  [address]  [place] [address]  [phone]                Yoan Han MD  03/16/24 1727       Yoan Han MD  03/16/24 172

## 2024-03-18 ENCOUNTER — HOSPITAL ENCOUNTER (EMERGENCY)
Facility: HOSPITAL | Age: 64
Discharge: HOME | End: 2024-03-18
Attending: EMERGENCY MEDICINE
Payer: MEDICARE

## 2024-03-18 ENCOUNTER — TELEPHONE (OUTPATIENT)
Dept: FAMILY MEDICINE | Facility: CLINIC | Age: 64
End: 2024-03-18
Payer: MEDICARE

## 2024-03-18 VITALS
WEIGHT: 190 LBS | SYSTOLIC BLOOD PRESSURE: 190 MMHG | HEIGHT: 63 IN | BODY MASS INDEX: 33.66 KG/M2 | RESPIRATION RATE: 16 BRPM | HEART RATE: 73 BPM | OXYGEN SATURATION: 98 % | TEMPERATURE: 98 F | DIASTOLIC BLOOD PRESSURE: 86 MMHG

## 2024-03-18 DIAGNOSIS — R42 DIZZINESS: ICD-10-CM

## 2024-03-18 LAB
ANION GAP SERPL CALC-SCNC: 7 MMOL/L (ref 8–16)
BASOPHILS # BLD AUTO: 0.05 K/UL (ref 0–0.2)
BASOPHILS NFR BLD: 0.6 % (ref 0–1.9)
BUN SERPL-MCNC: 20 MG/DL (ref 8–23)
CALCIUM SERPL-MCNC: 9.6 MG/DL (ref 8.7–10.5)
CHLORIDE SERPL-SCNC: 103 MMOL/L (ref 95–110)
CO2 SERPL-SCNC: 31 MMOL/L (ref 23–29)
CREAT SERPL-MCNC: 1.1 MG/DL (ref 0.5–1.4)
DIFFERENTIAL METHOD BLD: ABNORMAL
EOSINOPHIL # BLD AUTO: 0.1 K/UL (ref 0–0.5)
EOSINOPHIL NFR BLD: 1 % (ref 0–8)
ERYTHROCYTE [DISTWIDTH] IN BLOOD BY AUTOMATED COUNT: 15 % (ref 11.5–14.5)
EST. GFR  (NO RACE VARIABLE): 55.8 ML/MIN/1.73 M^2
GLUCOSE SERPL-MCNC: 104 MG/DL (ref 70–110)
GLUCOSE SERPL-MCNC: 112 MG/DL (ref 70–110)
HCT VFR BLD AUTO: 42.1 % (ref 37–48.5)
HGB BLD-MCNC: 13.6 G/DL (ref 12–16)
IMM GRANULOCYTES # BLD AUTO: 0.03 K/UL (ref 0–0.04)
IMM GRANULOCYTES NFR BLD AUTO: 0.4 % (ref 0–0.5)
LYMPHOCYTES # BLD AUTO: 3 K/UL (ref 1–4.8)
LYMPHOCYTES NFR BLD: 37.7 % (ref 18–48)
MCH RBC QN AUTO: 29.1 PG (ref 27–31)
MCHC RBC AUTO-ENTMCNC: 32.3 G/DL (ref 32–36)
MCV RBC AUTO: 90 FL (ref 82–98)
MONOCYTES # BLD AUTO: 0.7 K/UL (ref 0.3–1)
MONOCYTES NFR BLD: 8.7 % (ref 4–15)
NEUTROPHILS # BLD AUTO: 4.2 K/UL (ref 1.8–7.7)
NEUTROPHILS NFR BLD: 51.6 % (ref 38–73)
NRBC BLD-RTO: 0 /100 WBC
PLATELET # BLD AUTO: 245 K/UL (ref 150–450)
PMV BLD AUTO: 10.5 FL (ref 9.2–12.9)
POTASSIUM SERPL-SCNC: 3.4 MMOL/L (ref 3.5–5.1)
RBC # BLD AUTO: 4.68 M/UL (ref 4–5.4)
SODIUM SERPL-SCNC: 141 MMOL/L (ref 136–145)
WBC # BLD AUTO: 8.04 K/UL (ref 3.9–12.7)

## 2024-03-18 PROCEDURE — 93010 ELECTROCARDIOGRAM REPORT: CPT | Mod: ,,, | Performed by: GENERAL PRACTICE

## 2024-03-18 PROCEDURE — 80048 BASIC METABOLIC PNL TOTAL CA: CPT | Performed by: EMERGENCY MEDICINE

## 2024-03-18 PROCEDURE — 82962 GLUCOSE BLOOD TEST: CPT

## 2024-03-18 PROCEDURE — 85025 COMPLETE CBC W/AUTO DIFF WBC: CPT | Performed by: NURSE PRACTITIONER

## 2024-03-18 PROCEDURE — 93005 ELECTROCARDIOGRAM TRACING: CPT | Performed by: GENERAL PRACTICE

## 2024-03-18 PROCEDURE — 99284 EMERGENCY DEPT VISIT MOD MDM: CPT | Mod: 25

## 2024-03-18 RX ORDER — LISINOPRIL 10 MG/1
10 TABLET ORAL DAILY
Qty: 90 TABLET | Refills: 3 | OUTPATIENT
Start: 2024-03-18 | End: 2024-03-18

## 2024-03-18 RX ORDER — METOPROLOL SUCCINATE 50 MG/1
100 TABLET, EXTENDED RELEASE ORAL DAILY
Qty: 90 TABLET | Refills: 1 | OUTPATIENT
Start: 2024-03-18 | End: 2024-03-18

## 2024-03-18 RX ORDER — METOPROLOL SUCCINATE 50 MG/1
100 TABLET, EXTENDED RELEASE ORAL DAILY
Qty: 90 TABLET | Refills: 1 | Status: SHIPPED | OUTPATIENT
Start: 2024-03-18 | End: 2024-03-20

## 2024-03-18 RX ORDER — LISINOPRIL 10 MG/1
10 TABLET ORAL DAILY
Qty: 90 TABLET | Refills: 3 | Status: SHIPPED | OUTPATIENT
Start: 2024-03-18 | End: 2024-03-20

## 2024-03-18 NOTE — TELEPHONE ENCOUNTER
----- Message from Sandra Mars sent at 3/18/2024 11:34 AM CDT -----  Type: Needs Medical Advice  Who Called:  patient  Best Call Back Number: [phone] (home)   Additional Information: patient is calling because her BP is steadily going up, requesting a call back asap

## 2024-03-18 NOTE — DISCHARGE INSTRUCTIONS
Take 50mg of your toprol xl tonight. Take two pills (100mg) in the morning. If your blood pressure is still uncontrolled start the 10 mg lisinopril these have been sent electronically to the pharmacy.

## 2024-03-18 NOTE — ED PROVIDER NOTES
"Encounter Date: 3/18/2024       History     Chief Complaint   Patient presents with    Hypertension     States was seen here 2 days ago for same     64-year-old female with a history of hypertension on metoprolol XL 50 mg daily presents with hypertension.  She feels slightly dizzy but otherwise no complaints.  Blood pressure was 160 earlier today and then she rechecked it and it was 200 so came to the ER.  Seen several days ago for similar complaints.  She has some neck pain that her primary care doctor thinks is a "pinched nerve" that is improving since it started.  She has no chest pain or heaviness or chest pressure.  She is compliant with medications.  No other complaints.        Review of patient's allergies indicates:   Allergen Reactions    Statins-hmg-coa reductase inhibitors Other (See Comments)     Muscle and joint pain      Milk containing products (dairy)     Naproxen Itching    Penicillins Itching     Pt states can take Keflex    Sulfa (sulfonamide antibiotics) Itching     Past Medical History:   Diagnosis Date    Anxiety     Back pain     Degenerative disc disease     GERD (gastroesophageal reflux disease)     Heel spur     Hypertension     Hypertrophy of both inferior nasal turbinates     Nasal septal deviation     Psoriasis     Sciatica      Past Surgical History:   Procedure Laterality Date    HYSTERECTOMY  1999    Ileocolectomy  2000        KNEE ARTHROSCOPY W/ DEBRIDEMENT      x 2, right    LAPAROSCOPIC CHOLECYSTECTOMY N/A 5/16/2021    Procedure: CHOLECYSTECTOMY, LAPAROSCOPIC;  Surgeon: Pepe Cool MD;  Location: [facility];  Service: General;  Laterality: N/A;    LUMBAR EPIDURAL INJECTION      TONSILLECTOMY      WRIST SURGERY Right     plate     Family History   Problem Relation Age of Onset    Hypertension Father     Cancer Father     Hypertension Mother     Heart disease Mother     Diabetes Brother     Hypertension Brother     Prostate cancer Brother     Diabetes Sister     Hypertension " Sister     Ovarian cancer Neg Hx     Breast cancer Neg Hx      Social History     Tobacco Use    Smoking status: Former     Current packs/day: 0.00     Average packs/day: 0.3 packs/day for 22.0 years (5.5 ttl pk-yrs)     Types: Cigarettes     Start date: 3/1/1995     Quit date: 3/1/2017     Years since quittin.0    Smokeless tobacco: Never    Tobacco comments:     less than 1 pack week   Substance Use Topics    Alcohol use: Yes     Comment: socially    Drug use: No     Review of Systems   Constitutional:  Negative for fever.   Respiratory:  Negative for shortness of breath.    Cardiovascular:  Negative for chest pain.   Gastrointestinal:  Negative for nausea.   Musculoskeletal:  Positive for neck pain (better since onset). Negative for back pain.   Neurological:  Negative for weakness.       Physical Exam     Initial Vitals [24 1310]   BP Pulse Resp Temp SpO2   (!) 240/113 74 16 97.8 °F (36.6 °C) 96 %      MAP       --         Physical Exam    Nursing note and vitals reviewed.  Constitutional: She appears well-developed and well-nourished. She is not diaphoretic. No distress.   HENT:   Head: Normocephalic and atraumatic.   Eyes: EOM are normal.   Neck: Neck supple.   Normal range of motion.  Cardiovascular:  Normal rate, regular rhythm and normal heart sounds.     Exam reveals no gallop and no friction rub.       No murmur heard.  Pulmonary/Chest: Breath sounds normal. No respiratory distress. She has no wheezes. She has no rhonchi. She has no rales.   Musculoskeletal:         General: Normal range of motion.      Cervical back: Normal range of motion and neck supple.     Neurological: She is alert and oriented to person, place, and time.   Skin: Skin is warm and dry.   Psychiatric: She has a normal mood and affect. Her behavior is normal. Judgment and thought content normal.         ED Course   Procedures  Labs Reviewed   CBC W/ AUTO DIFFERENTIAL   BASIC METABOLIC PANEL          Imaging Results    None    "       Medications - No data to display  Medical Decision Making  64-year-old female history of hypertension typically well managed on 50 mg of Toprol-XL presents with elevated blood pressure for several days.  She has some posterior neck pain that is improving the primary care thinks is a "pinched nerve." No chest pain no shortness of breath no extremity numbness or weakness.  Blood pressure initially systolic 240 in the ER but came down to 190 without any treatment.  I will double her Toprol-XL and have added lisinopril if needed.  I communicated this to primary care doctor antoine who acknowledges receipt of my secure chat.  She has a nurse visit on Wednesday as ER follow-up from the previous ER visit.  I see no reason for any further ER tests, no indication to emergently lower her blood pressure.  Patient and  voiced understanding of instructions.    Amount and/or Complexity of Data Reviewed  Independent Historian: spouse  External Data Reviewed: notes.     Details: Previous ER notes reviewed  Labs:  Decision-making details documented in ED Course.  ECG/medicine tests: independent interpretation performed. Decision-making details documented in ED Course.    Risk  Prescription drug management.               ED Course as of 03/18/24 1643   Mon Mar 18, 2024   1345 BP(!): 240/113 [EF]   1345 Temp: 97.8 °F (36.6 °C) [EF]   1345 Temp Source: Oral [EF]   1345 Pulse: 74 [EF]   1345 Resp: 16 [EF]   1345 SpO2: 96 % [EF]   1410 Sinus rhythm 81 beats per minute normal axis right bundle no ST elevation or depression independently interpreted no change from prior [EF]   1426 BP(!): 203/97 [EF]   1426 POC Glucose: 104  Blood pressure coming down without medication [EF]   1503  [EF]      ED Course User Index  [EF] Ethan Bryant MD                           Clinical Impression:  Final diagnoses:  [R42] Dizziness                 Ethan Bryant MD  03/18/24 1646    "

## 2024-03-18 NOTE — FIRST PROVIDER EVALUATION
Emergency Department TeleTriage Encounter Note      CHIEF COMPLAINT    Chief Complaint   Patient presents with    Hypertension     States was seen here 2 days ago for same       VITAL SIGNS   Initial Vitals [03/18/24 1310]   BP Pulse Resp Temp SpO2   (!) 240/113 74 16 97.8 °F (36.6 °C) 96 %      MAP       --            ALLERGIES    Review of patient's allergies indicates:   Allergen Reactions    Statins-hmg-coa reductase inhibitors Other (See Comments)     Muscle and joint pain      Milk containing products (dairy)     Naproxen Itching    Penicillins Itching     Pt states can take Keflex    Sulfa (sulfonamide antibiotics) Itching       PROVIDER TRIAGE NOTE  Verbal consent for the teletriage evaluation was given by the patient at the start of the evaluation.  All efforts will be made to maintain patient's privacy during the evaluation.      This is a teletriage evaluation of a 64 y.o. female presenting to the ED with c/o elevated blood pressure; seen 2 days ago for same complaint. Reports some dizziness.  Limited physical exam via telehealth: The patient is awake, alert, answering questions appropriately and is not in respiratory distress.  As the Teletriage provider, I performed an initial assessment and ordered appropriate labs and imaging studies, if any, to facilitate the patient's care once placed in the ED. Once a room is available, care and a full evaluation will be completed by an alternate ED provider.  Any additional orders and the final disposition will be determined by that provider.  All imaging and labs will not be followed-up by the Teletriage Team, including myself.          ORDERS  Labs Reviewed - No data to display    ED Orders (720h ago, onward)      Start Ordered     Status Ordering Provider    03/18/24 1318 03/18/24 1317  Saline lock IV  Once         Ordered REKHA MARTINEZ    03/18/24 1318 03/18/24 1317  Cardiac Monitoring - Adult  Continuous        Comments: Notify Physician If:    Ordered  Progress Note     Patient: Meet Larios               Sex: male           MRN: [id number]       YOB: 1985      Age:  36 year old               Subjective:  RRT called overnight for AMS. Narcan was given, with significant improvement in alertness.   No other events overnight  Pain controlled  No other complaints    Objective:  Blood pressure 109/66, pulse 73, temperature 97.9 °F (36.6 °C), temperature source Oral, resp. rate 18, height 5' 5\" (1.651 m), weight 104.5 kg (230 lb 6.1 oz), SpO2 100 %.    Intake/Output     Intake/Output Summary (Last 24 hours) at 11/29/2021 0638  Last data filed at 11/28/2021 1041  Gross per 24 hour   Intake 247.9 ml   Output 35 ml   Net 212.9 ml     Intake/Output last 3 shifts:  I/O last 3 completed shifts:  In: 647.9 [P.O.:640; I.V.:7.9]  Out: 35 [Drains:35]    Physical Exam:   General: No acute distress, Resting comfortably in bed  HEENT: Normocephalic, Supple neck  CV: Regular Rate. Palpable bilateral femoral pulses, palpable L DP/PT, non palpable R DP/PT on the right, R DP/PT dopplerable  Resp: Non-labored breathing, symmetric chest rise  Abdomen: Soft, non-tender, no guarding, no rebound, no distension, ostomy in place, suprapubic catheter in place   MSK: Moving all four extremities   Neuro: Alert and Oriented x3    Medication  Scheduled Meds:  • lidocaine  7 mL Subcutaneous Once   • midodrine  10 mg Oral TID   • sodium hypochlorite   Topical 2 times per day   • sevelamer carbonate  2,400 mg Oral TID WC   • [Held by provider] pregabalin  75 mg Oral Q24H   • sodium chloride (PF)  2 mL Intracatheter 2 times per day   • cefepime (MAXIPIME) IVPB  1,000 mg Intravenous Daily   • B complex-vitamin C-folic acid  1 tablet Oral Daily   • atorvastatin  10 mg Oral QHS   • buPROPion XL  150 mg Oral Daily   • [Held by provider] carvedilol  3.125 mg Oral Once per day on Mon Wed Fri   • [Held by provider] carvedilol  6.25 mg Oral BID   • lidocaine  2 patch Transdermal QHS   •  REKHA MARTINEZ    03/18/24 1318 03/18/24 1317  Pulse Oximetry Continuous  Continuous         Ordered REKHA MARTINEZ    03/18/24 1318 03/18/24 1317  EKG 12-lead  Once         Ordered REKHA MARTINEZ    03/18/24 1318 03/18/24 1317  CBC auto differential  STAT         Ordered REKHA MARTINEZ    03/18/24 1318 03/18/24 1317  Comprehensive metabolic panel  STAT         Ordered REKHA MARTINEZ    03/18/24 1318 03/18/24 1317  POCT glucose  Once         Ordered REKHA MARTINEZ    03/18/24 1318 03/18/24 1317  Urinalysis, Reflex to Urine Culture Urine, Clean Catch  STAT         Ordered REKHA MARTINEZ              Virtual Visit Note: The provider triage portion of this emergency department evaluation and documentation was performed via [employer], a HIPAA-compliant telemedicine application, in concert with a tele-presenter in the room. A face to face patient evaluation with one of my colleagues will occur once the patient is placed in an emergency department room.      DISCLAIMER: This note was prepared with [employer] voice recognition transcription software. Garbled syntax, mangled pronouns, and other bizarre constructions may be attributed to that software system.     melatonin  6 mg Oral Nightly   • nystatin   Topical BID   • polyethylene glycol  17 g Oral Daily   • senna  2 tablet Oral BID   • sertraline  25 mg Oral QHS   • sertraline  125 mg Oral Daily     Continuous Infusions:  • heparin (porcine) 25,000 units/250 mL in dextrose 5 % infusion 25 Units/kg/hr (11/29/21 0535)   • sodium chloride 0.9% infusion     • sodium chloride 0.9% infusion       PRN Meds:.heparin (porcine), heparin (porcine), sodium chloride, [Held by provider] HYDROcodone-acetaminophen, sodium chloride, sodium chloride, sodium chloride, dextrose, dextrose, glucagon, dextrose, dextrose, calcium carbonate, ondansetron, prochlorperazine, labetalol, midodrine, sodium chloride, [Held by provider] HYDROmorphone, acetaminophen, Baclofen, docusate sodium, ipratropium-albuterol, meclizine, polyvinyl alcohol, albumin human 25%    Lab/Data Reviewed:  Recent Results (from the past 24 hour(s))   Partial Thromboplastin Time    Collection Time: 11/28/21  9:44 AM   Result Value Ref Range    PTT 32 (H) 22 - 30 sec   Partial Thromboplastin Time    Collection Time: 11/28/21  4:51 PM   Result Value Ref Range    PTT 37 (H) 22 - 30 sec   Partial Thromboplastin Time    Collection Time: 11/29/21 12:33 AM   Result Value Ref Range    PTT 30 22 - 30 sec   GLUCOSE, BEDSIDE - POINT OF CARE    Collection Time: 11/29/21 12:44 AM   Result Value Ref Range    GLUCOSE, BEDSIDE - POINT OF CARE 104 (H) 70 - 99 mg/dL   Electrocardiogram 12-Lead    Collection Time: 11/29/21  2:14 AM   Result Value Ref Range    Ventricular Rate EKG/Min (BPM) 71     Atrial Rate (BPM) 71     CT-Interval (MSEC) 224     QRS-Interval (MSEC) 118     QT-Interval (MSEC) 398     QTc 433     P Axis (Degrees) 56     R Axis (Degrees) 142     T Axis (Degrees) -34     REPORT TEXT       Sinus rhythm  Prolonged CT interval  IRBBB and LPFB     Blood Gas, Arterial    Collection Time: 11/29/21  2:29 AM   Result Value Ref Range    pH, Arterial 7.23 (LL) 7.35 - 7.45 Units    pCO2,  Arterial 70 (HH) 35 - 48 mm Hg    pO2, Arterial 155 (H) 83 - 108 mm Hg    HCO3, Arterial 28 22 - 28 mmol/L    Base Excess/ Deficit, Arterial 1 -2 - 3 mmol/L    O2 Saturation, Arterial 99 95 - 99 %    Oxyhemoglobin, Arterial 97 94 - 98 %    Hemoglobin, Blood Gas 8.7 (L) 13.0 - 17.0 g/dL   Lactic Acid Venous With Reflex    Collection Time: 11/29/21  2:45 AM   Result Value Ref Range    Lactate, Venous 0.9 0.0 - 2.0 mmol/L   GLUCOSE, BEDSIDE - POINT OF CARE    Collection Time: 11/29/21  2:59 AM   Result Value Ref Range    GLUCOSE, BEDSIDE - POINT OF CARE 115 (H) 70 - 99 mg/dL   Blood Gas, Arterial    Collection Time: 11/29/21  5:45 AM   Result Value Ref Range    pH, Arterial 7.24 (LL) 7.35 - 7.45 Units    pCO2, Arterial 68 (HH) 35 - 48 mm Hg    pO2, Arterial 141 (H) 83 - 108 mm Hg    HCO3, Arterial 28 22 - 28 mmol/L    Base Excess/ Deficit, Arterial 0 -2 - 3 mmol/L    O2 Saturation, Arterial 99 95 - 99 %    Oxyhemoglobin, Arterial 96 94 - 98 %    Hemoglobin, Blood Gas 8.4 (L) 13.0 - 17.0 g/dL         Other Studies     CTA ABDOMINAL AORTA ILIOFEMORAL BILATERAL RUNOFF W CONTRAST  Result Date: 11/28/2021  Impression: 1.  No evidence of thromboembolic disease or significant stenosis within the legs.  Anterior and posterior tibial arteries are patent bilaterally, though evaluation is somewhat limited by contrast bolus timing.  Right dorsalis pedis artery is small and poorly visualized.  Duplex ultrasound may be considered for further evaluation of infrapopliteal vessels. 2.  Visceral findings within the lower chest, abdomen, and pelvis are similar to comparison studies, with findings as above. Electronically Signed by: ALMA MOHAN MD Signed on: 11/28/2021 8:55 AM       Assessment/Plan  36 year old male with multiple co-morbidities including HTN, ESRD on HD TTS, H/o chronic hydronephrosis and pyelonephritis s/p nephrectomy, and suprapubic cath, T2DM, spina bifida, hx of sacral decub with diverting transverse loop  colostomy who was initially admitted for pulmonary edema. Pt with RLE pain, vascular surgery consulted for evaluation of possible limb ischemia.    - No acute vascular surgery intervention indicated at this time  - On heparin drip; PTT 30  - CTA without evidence of thromboembolic disease or significant stenosis within  the legs  - Pain control prn  - Rest per primary    Will discuss w/ attending.    Mecca Beckham MD  PGY-1 Surgery

## 2024-03-20 ENCOUNTER — OFFICE VISIT (OUTPATIENT)
Dept: FAMILY MEDICINE | Facility: CLINIC | Age: 64
End: 2024-03-20
Payer: MEDICARE

## 2024-03-20 VITALS
BODY MASS INDEX: 33.98 KG/M2 | SYSTOLIC BLOOD PRESSURE: 150 MMHG | HEART RATE: 61 BPM | WEIGHT: 191.81 LBS | DIASTOLIC BLOOD PRESSURE: 86 MMHG | OXYGEN SATURATION: 97 % | RESPIRATION RATE: 18 BRPM | HEIGHT: 63 IN | TEMPERATURE: 97 F

## 2024-03-20 DIAGNOSIS — I10 HYPERTENSION, ESSENTIAL: Primary | ICD-10-CM

## 2024-03-20 DIAGNOSIS — K21.9 GASTROESOPHAGEAL REFLUX DISEASE, UNSPECIFIED WHETHER ESOPHAGITIS PRESENT: ICD-10-CM

## 2024-03-20 DIAGNOSIS — Z79.82 ASPIRIN LONG-TERM USE: ICD-10-CM

## 2024-03-20 DIAGNOSIS — F41.9 ANXIETY: ICD-10-CM

## 2024-03-20 PROBLEM — J11.1 INFLUENZA: Status: RESOLVED | Noted: 2018-01-25 | Resolved: 2024-03-20

## 2024-03-20 PROBLEM — Z12.39 BREAST SCREENING: Status: RESOLVED | Noted: 2017-11-28 | Resolved: 2024-03-20

## 2024-03-20 PROCEDURE — 99213 OFFICE O/P EST LOW 20 MIN: CPT | Mod: S$GLB,,, | Performed by: NURSE PRACTITIONER

## 2024-03-20 PROCEDURE — 99999 PR PBB SHADOW E&M-EST. PATIENT-LVL IV: CPT | Mod: PBBFAC,,, | Performed by: NURSE PRACTITIONER

## 2024-03-20 RX ORDER — CARVEDILOL 6.25 MG/1
6.25 TABLET ORAL 2 TIMES DAILY WITH MEALS
Qty: 60 TABLET | Refills: 1 | Status: SHIPPED | OUTPATIENT
Start: 2024-03-20 | End: 2024-05-20

## 2024-03-20 RX ORDER — LISINOPRIL 10 MG/1
10 TABLET ORAL DAILY
Qty: 30 TABLET | Refills: 1 | Status: SHIPPED | OUTPATIENT
Start: 2024-03-20 | End: 2024-04-02

## 2024-03-20 RX ORDER — ALPRAZOLAM 0.5 MG/1
0.5 TABLET ORAL NIGHTLY PRN
Qty: 30 TABLET | Refills: 2 | Status: SHIPPED | OUTPATIENT
Start: 2024-03-20

## 2024-03-20 NOTE — PROGRESS NOTES
SUBJECTIVE:      Patient ID: Paula Hanna is a 64 y.o. female.    Chief Complaint: Follow-up (ER)    HPI  Went to er for hypertensive emergency  Has been on metoprolol for a long time  No hx of asthma or copd  Will stop metoprolol and start co reg bid  Did not start the lisinopril yet as provider in er scared anxiety worse in patient telling her to stop lisinopril if lips get numb  Explained that would be an anaphlyatic reaction and could happen w any medication and if does to stop the medication take benadryl and go to er  Bp is elevated- will start lisinopril today  Is also on aldactone- will continue    Denies chest pain  Denies sob  Denies fever  Denies chills    Xanax prn anxiety-  checked; nonsuicidal    Past Surgical History:   Procedure Laterality Date    HYSTERECTOMY      Ileocolectomy          KNEE ARTHROSCOPY W/ DEBRIDEMENT      x 2, right    LAPAROSCOPIC CHOLECYSTECTOMY N/A 2021    Procedure: CHOLECYSTECTOMY, LAPAROSCOPIC;  Surgeon: Pepe Cool MD;  Location: [facility];  Service: General;  Laterality: N/A;    LUMBAR EPIDURAL INJECTION      TONSILLECTOMY      WRIST SURGERY Right     plate     Family History   Problem Relation Age of Onset    Hypertension Father     Cancer Father     Hypertension Mother     Heart disease Mother     Diabetes Brother     Hypertension Brother     Prostate cancer Brother     Diabetes Sister     Hypertension Sister     Ovarian cancer Neg Hx     Breast cancer Neg Hx       Social History     Socioeconomic History    Marital status:    Tobacco Use    Smoking status: Former     Current packs/day: 0.00     Average packs/day: 0.3 packs/day for 22.0 years (5.5 ttl pk-yrs)     Types: Cigarettes     Start date: 3/1/1995     Quit date: 3/1/2017     Years since quittin.0    Smokeless tobacco: Never    Tobacco comments:     less than 1 pack week   Substance and Sexual Activity    Alcohol use: Yes     Comment: socially     Drug use: No    Sexual activity: Yes     Partners: Male     Birth control/protection: None, Surgical     Social Determinants of Health     Stress: No Stress Concern Present (5/12/2020)    [employer] [place] [employer] - Occupational Stress Questionnaire     Feeling of Stress : Not at all     Current Outpatient Medications   Medication Sig Dispense Refill    aspirin (ECOTRIN) 81 MG EC tablet Take 81 mg by mouth once daily.      azelastine (ASTELIN) 137 mcg (0.1 %) nasal spray 1 spray (137 mcg total) by Nasal route 2 (two) times daily. 90 mL 1    doxycycline (VIBRAMYCIN) 100 MG Cap Take 1 capsule (100 mg total) by mouth every 12 (twelve) hours. 20 capsule 0    EScitalopram oxalate (LEXAPRO) 10 MG tablet Take 10 mg by mouth.      estradioL (ESTRACE) 1 MG tablet TAKE 1 TABLET(1 MG) BY MOUTH EVERY DAY 90 tablet 1    fluticasone propionate (FLONASE) 50 mcg/actuation nasal spray 1 spray (50 mcg total) by Each Nostril route once daily. 16 g 5    ibuprofen (ADVIL,MOTRIN) 800 MG tablet TAKE 1 TABLET(800 MG) BY MOUTH THREE TIMES DAILY EVERY 8 HOURS AS NEEDED FOR PAIN 30 tablet 2    levocetirizine (XYZAL) 5 MG tablet Take 1 tablet (5 mg total) by mouth every evening. 90 tablet 1    magnesium oxide (MAG-OX) 400 mg (241.3 mg magnesium) tablet Take 1 tablet (400 mg total) by mouth once daily. 90 tablet 1    methocarbamoL (ROBAXIN) 500 MG Tab Take 1 tablet (500 mg total) by mouth 3 (three) times daily as needed (Pain). 15 tablet 0    pantoprazole (PROTONIX) 40 MG tablet Take 1 tablet (40 mg total) by mouth once daily. 90 tablet 1    polyethylene glycol (GLYCOLAX) 17 gram/dose powder Take 17 g by mouth once daily.   0    spironolactone (ALDACTONE) 25 MG tablet Take 1 tablet (25 mg total) by mouth once daily. 90 tablet 1    ALPRAZolam (XANAX) 0.5 MG tablet Take 1 tablet (0.5 mg total) by mouth nightly as needed for Anxiety. 30 tablet 2    carvediloL (COREG) 6.25 MG tablet Take 1 tablet (6.25 mg total) by  "mouth 2 (two) times daily with meals. 60 tablet 1    lisinopriL 10 MG tablet Take 1 tablet (10 mg total) by mouth once daily. Take this if blood pressure still elevated on 100mg of metoprolol (toprol xl) 30 tablet 1     No current facility-administered medications for this visit.     Review of patient's allergies indicates:   Allergen Reactions    Statins-hmg-coa reductase inhibitors Other (See Comments)     Muscle and joint pain      Milk containing products (dairy)     Naproxen Itching    Penicillins Itching     Pt states can take Keflex    Sulfa (sulfonamide antibiotics) Itching      Past Medical History:   Diagnosis Date    Anxiety     Back pain     Degenerative disc disease     GERD (gastroesophageal reflux disease)     Heel spur     Hypertension     Hypertrophy of both inferior nasal turbinates     Nasal septal deviation     Psoriasis     Sciatica      Past Surgical History:   Procedure Laterality Date    HYSTERECTOMY  1999    Ileocolectomy  2000        KNEE ARTHROSCOPY W/ DEBRIDEMENT      x 2, right    LAPAROSCOPIC CHOLECYSTECTOMY N/A 5/16/2021    Procedure: CHOLECYSTECTOMY, LAPAROSCOPIC;  Surgeon: Pepe Cool MD;  Location: [facility] OR;  Service: General;  Laterality: N/A;    LUMBAR EPIDURAL INJECTION      TONSILLECTOMY      WRIST SURGERY Right     plate       Review of Systems   All other systems reviewed and are negative.   OBJECTIVE:      Vitals:    03/20/24 1211   BP: (!) 150/86   BP Location: Left arm   Patient Position: Sitting   BP Method: Large (Manual)   Pulse: 61   Resp: 18   Temp: 97.4 °F (36.3 °C)   SpO2: 97%   Weight: 87 kg (191 lb 12.8 oz)   Height: 5' 3" (1.6 m)     Physical Exam  Vitals and nursing note reviewed.   Constitutional:       Appearance: Normal appearance. She is obese.   HENT:      Head: Normocephalic and atraumatic.   Eyes:      Pupils: Pupils are equal, round, and reactive to light.   Cardiovascular:      Rate and Rhythm: Normal rate and regular " rhythm.      Pulses: Normal pulses.      Heart sounds: Normal heart sounds.      Comments: No edema on bilateral lower ext  Pulmonary:      Effort: Pulmonary effort is normal.      Breath sounds: Normal breath sounds.   Musculoskeletal:      Cervical back: Normal range of motion.   Skin:     General: Skin is warm and dry.   Neurological:      General: No focal deficit present.      Mental Status: She is alert and oriented to person, place, and time. Mental status is at baseline.   Psychiatric:         Mood and Affect: Mood normal.         Behavior: Behavior normal.         Thought Content: Thought content normal.         Judgment: Judgment normal.      Comments: nonsuicidal      Assessment:       1. Hypertension, essential    2. Aspirin long-term use    3. Gastroesophageal reflux disease, unspecified whether esophagitis present    4. Anxiety        Plan:       Hypertension, essential  -     lisinopriL 10 MG tablet; Take 1 tablet (10 mg total) by mouth once daily. Take this if blood pressure still elevated on 100mg of metoprolol (toprol xl)  Dispense: 30 tablet; Refill: 1  -     carvediloL (COREG) 6.25 MG tablet; Take 1 tablet (6.25 mg total) by mouth 2 (two) times daily with meals.  Dispense: 60 tablet; Refill: 1    Aspirin long-term use    Gastroesophageal reflux disease, unspecified whether esophagitis present    Anxiety  -     ALPRAZolam (XANAX) 0.5 MG tablet; Take 1 tablet (0.5 mg total) by mouth nightly as needed for Anxiety.  Dispense: 30 tablet; Refill: 2      Take medications as ordered  Lisinopril 10 mg daily  Stop metoprolol  Start 6.25 mg bid coreg  Follow up in 3 weeks or sooner if needed  No follow-ups on file.      3/20/2024 LETICIA Bee, FNP

## 2024-03-27 ENCOUNTER — OFFICE VISIT (OUTPATIENT)
Dept: ORTHOPEDICS | Facility: CLINIC | Age: 64
End: 2024-03-27
Payer: MEDICARE

## 2024-03-27 VITALS — HEIGHT: 63 IN | WEIGHT: 191.81 LBS | BODY MASS INDEX: 33.98 KG/M2

## 2024-03-27 DIAGNOSIS — M47.812 FACET ARTHRITIS OF CERVICAL REGION: ICD-10-CM

## 2024-03-27 DIAGNOSIS — M47.812 CERVICAL SPONDYLOSIS: ICD-10-CM

## 2024-03-27 DIAGNOSIS — M17.12 PRIMARY OSTEOARTHRITIS OF LEFT KNEE: Primary | ICD-10-CM

## 2024-03-27 PROCEDURE — 99213 OFFICE O/P EST LOW 20 MIN: CPT | Mod: S$GLB,,, | Performed by: ORTHOPAEDIC SURGERY

## 2024-03-27 RX ORDER — TIZANIDINE 4 MG/1
4 TABLET ORAL 2 TIMES DAILY
Qty: 60 TABLET | Refills: 0 | Status: SHIPPED | OUTPATIENT
Start: 2024-03-27

## 2024-03-27 RX ORDER — MELOXICAM 15 MG/1
15 TABLET ORAL DAILY
Qty: 30 TABLET | Refills: 2 | Status: SHIPPED | OUTPATIENT
Start: 2024-03-27

## 2024-03-27 NOTE — PROGRESS NOTES
Subjective:       Patient ID: Paula Hanna is a 64 y.o. female.    Chief Complaint: Pain of the Left Knee (Patient is here for a f/up on Left knee pain, injected 12.27.23, states her pain is the same as her last visit, with flare ups of pain) and Pain of the Neck (Patient states she has been having neck pain x 1 month, no known injury, denies numbness & tingling. ROM is painful)      History of Present Illness    Prior to meeting with the patient I reviewed the medical chart in [facility]. This included reviewing the previous progress notes from our office, review of the patient's last appointment with their primary care provider, review of any visits to the emergency room, and review of any pain management appointments or procedures.   One month history of bilateral neck pain without radiation and without trauma symptoms are worse with active range of motion no history of any radicular symptoms left knee is progressively feeling better but has a history of medial compartment osteoarthritis.    Current Medications  Current Outpatient Medications   Medication Sig Dispense Refill    ALPRAZolam (XANAX) 0.5 MG tablet Take 1 tablet (0.5 mg total) by mouth nightly as needed for Anxiety. 30 tablet 2    aspirin (ECOTRIN) 81 MG EC tablet Take 81 mg by mouth once daily.      azelastine (ASTELIN) 137 mcg (0.1 %) nasal spray 1 spray (137 mcg total) by Nasal route 2 (two) times daily. 90 mL 1    carvediloL (COREG) 6.25 MG tablet Take 1 tablet (6.25 mg total) by mouth 2 (two) times daily with meals. 60 tablet 1    EScitalopram oxalate (LEXAPRO) 10 MG tablet Take 10 mg by mouth.      estradioL (ESTRACE) 1 MG tablet TAKE 1 TABLET(1 MG) BY MOUTH EVERY DAY 90 tablet 1    fluticasone propionate (FLONASE) 50 mcg/actuation nasal spray 1 spray (50 mcg total) by Each Nostril route once daily. 16 g 5    ibuprofen (ADVIL,MOTRIN) 800 MG tablet TAKE 1 TABLET(800 MG) BY MOUTH THREE TIMES DAILY EVERY 8 HOURS AS NEEDED FOR PAIN 30 tablet 2     levocetirizine (XYZAL) 5 MG tablet Take 1 tablet (5 mg total) by mouth every evening. 90 tablet 1    lisinopriL 10 MG tablet Take 1 tablet (10 mg total) by mouth once daily. Take this if blood pressure still elevated on 100mg of metoprolol (toprol xl) 30 tablet 1    magnesium oxide (MAG-OX) 400 mg (241.3 mg magnesium) tablet Take 1 tablet (400 mg total) by mouth once daily. 90 tablet 1    methocarbamoL (ROBAXIN) 500 MG Tab Take 1 tablet (500 mg total) by mouth 3 (three) times daily as needed (Pain). 15 tablet 0    pantoprazole (PROTONIX) 40 MG tablet Take 1 tablet (40 mg total) by mouth once daily. 90 tablet 1    polyethylene glycol (GLYCOLAX) 17 gram/dose powder Take 17 g by mouth once daily.   0    spironolactone (ALDACTONE) 25 MG tablet Take 1 tablet (25 mg total) by mouth once daily. 90 tablet 1    doxycycline (VIBRAMYCIN) 100 MG Cap Take 1 capsule (100 mg total) by mouth every 12 (twelve) hours. (Patient not taking: Reported on 3/27/2024) 20 capsule 0    meloxicam (MOBIC) 15 MG tablet Take 1 tablet (15 mg total) by mouth once daily. 30 tablet 2    tiZANidine (ZANAFLEX) 4 MG tablet Take 1 tablet (4 mg total) by mouth 2 (two) times daily. 60 tablet 0     No current facility-administered medications for this visit.       Allergies  Review of patient's allergies indicates:   Allergen Reactions    Statins-hmg-coa reductase inhibitors Other (See Comments)     Muscle and joint pain      Milk containing products (dairy)     Naproxen Itching    Penicillins Itching     Pt states can take Keflex    Sulfa (sulfonamide antibiotics) Itching       Past Medical History  Past Medical History:   Diagnosis Date    Anxiety     Back pain     Degenerative disc disease     GERD (gastroesophageal reflux disease)     Heel spur     Hypertension     Hypertrophy of both inferior nasal turbinates     Nasal septal deviation     Psoriasis     Sciatica        Surgical History  Past Surgical History:   Procedure Laterality Date    HYSTERECTOMY       Ileocolectomy          KNEE ARTHROSCOPY W/ DEBRIDEMENT      x 2, right    LAPAROSCOPIC CHOLECYSTECTOMY N/A 2021    Procedure: CHOLECYSTECTOMY, LAPAROSCOPIC;  Surgeon: Pepe Cool MD;  Location: [facility];  Service: General;  Laterality: N/A;    LUMBAR EPIDURAL INJECTION      TONSILLECTOMY      WRIST SURGERY Right     plate       Family History:   Family History   Problem Relation Age of Onset    Hypertension Father     Cancer Father     Hypertension Mother     Heart disease Mother     Diabetes Brother     Hypertension Brother     Prostate cancer Brother     Diabetes Sister     Hypertension Sister     Ovarian cancer Neg Hx     Breast cancer Neg Hx        Social History:   Social History     Socioeconomic History    Marital status:    Tobacco Use    Smoking status: Former     Current packs/day: 0.00     Average packs/day: 0.3 packs/day for 22.0 years (5.5 ttl pk-yrs)     Types: Cigarettes     Start date: 3/1/1995     Quit date: 3/1/2017     Years since quittin.0    Smokeless tobacco: Never    Tobacco comments:     less than 1 pack week   Substance and Sexual Activity    Alcohol use: Yes     Comment: socially    Drug use: No    Sexual activity: Yes     Partners: Male     Birth control/protection: None, Surgical     Social Determinants of Health     Stress: No Stress Concern Present (2020)    [employer] - Occupational Stress Questionnaire     Feeling of Stress : Not at all       Hospitalization/Major Diagnostic Procedure:     Review of Systems     General/Constitutional:  Chills denies. Fatigue denies. Fever denies. Weight gain denies. Weight loss denies.    Respiratory:  Shortness of breath denies.    Cardiovascular:  Chest pain denies.    Gastrointestinal:  Constipation denies. Diarrhea denies. Nausea denies. Vomiting denies.     Hematology:  Easy bruising denies. Prolonged bleeding denies.     Genitourinary:  Frequent urination denies. Pain in  lower back denies. Painful urination denies.     Musculoskeletal:  See HPI for details    Skin:  Rash denies.    Neurologic:  Dizziness denies. Gait abnormalities denies. Seizures denies. Tingling/Numbess denies.    Psychiatric:  Anxiety denies. Depressed mood denies.     Objective:   Vital Signs: There were no vitals filed for this visit.     Physical Exam      General Examination:     Constitutional: The patient is alert and oriented to lace person and time. Mood is pleasant.     Head/Face: Normal facial features normal eyebrows    Eyes: Normal extraocular motion bilaterally    Lungs: Respirations are equal and unlabored    Gait is coordinated.    Cardiovascular: There are no swelling or varicosities present.    Lymphatic: Negative for adenopathy    Skin: Normal    Neurological: Level of consciousness normal. Oriented to place person and time and situation    Psychiatric: Oriented to time place person and situation    Tibia vera noted minimal tendon mild tenderness medial joint line full range motion cervical exam bilateral trapezius tenderness left greater than right full range of motion with endpoint pain Spurling's maneuver causes neck pain neurovascular normal both upper extremity  XRAY Report/ Interpretation:  Standing AP and lateral x-rays both knees were taken today early medial compartment osteoarthritis both knees no acute changes.  AP and lateral x-rays of the cervical spine were performed today. Indications neck pain. Findings:  This degeneration narrowing and osteophyte formation C5-6 C6-7 levels      Assessment:       1. Primary osteoarthritis of left knee    2. Facet arthritis of cervical region        Plan:       aPula was seen today for pain and pain.    Diagnoses and all orders for this visit:    Primary osteoarthritis of left knee  -     X-Ray Knee 1 or 2 View Left    Facet arthritis of cervical region  -     X-Ray Cervical Spine AP And Lateral  -     tiZANidine (ZANAFLEX) 4 MG tablet; Take 1  tablet (4 mg total) by mouth 2 (two) times daily.  -     meloxicam (MOBIC) 15 MG tablet; Take 1 tablet (15 mg total) by mouth once daily.         Follow up if symptoms worsen or fail to improve.    I believe her neck pain is more mechanical pain we gave her cervical exercise program and advised she obtain a Jelly cervical exercise but we will order a muscle relaxer and anti-inflammatory I have advised that if her symptoms do not return resolve in 2 weeks or if she begins to have any symptoms radiating to arms that she should contact me for further follow-up patient agrees with treatment plan.    Treatment options were discussed with regards to the nature of the medical condition. Conservative pain intervention and surgical options were discussed in detail. The probability of success of each separate treatment option was discussed. The patient expressed a clear understanding of the treatment options. With regards to surgery, the procedure risk, benefits, complications, and outcomes were discussed. No guarantees were given with regards to surgical outcome.   The risk of complications, morbidity, and mortality of patient management decisions have been made at the time of this visit. These are associated with the patient's problems, diagnostic procedures and treatment options. This includes the possible management options selected and those considered but not selected by the patient after shared medical decision making we discussed with the patient.     This note was created using Dragon voice recognition software that occasionally misinterpreted phrases or words.

## 2024-04-02 ENCOUNTER — TELEPHONE (OUTPATIENT)
Dept: FAMILY MEDICINE | Facility: CLINIC | Age: 64
End: 2024-04-02
Payer: MEDICARE

## 2024-04-02 LAB
OHS QRS DURATION: 144 MS
OHS QTC CALCULATION: 468 MS

## 2024-04-02 RX ORDER — LOSARTAN POTASSIUM 25 MG/1
25 TABLET ORAL DAILY
Qty: 90 TABLET | Refills: 0 | Status: SHIPPED | OUTPATIENT
Start: 2024-04-02 | End: 2024-05-02 | Stop reason: SDUPTHER

## 2024-04-02 NOTE — TELEPHONE ENCOUNTER
Spoke to pt about 6 min ago waiting on sharp to come out room with patient. She stated no SOB and throat not closing just cough especially at night     ----- Message from Nanyc Nuñez, Patient Care Assistant sent at 4/2/2024  9:30 AM CDT -----  Contact: Pt  Type: Needs Medical Advice    Who Called: Pt  Best Call Back Number: [phone]  Inquiry/Question: Pt si calling to follow up on her portal message. Please advise Thank you~

## 2024-04-09 LAB
OHS QRS DURATION: 156 MS
OHS QTC CALCULATION: 513 MS

## 2024-04-11 ENCOUNTER — OFFICE VISIT (OUTPATIENT)
Dept: FAMILY MEDICINE | Facility: CLINIC | Age: 64
End: 2024-04-11
Payer: MEDICARE

## 2024-04-11 VITALS — BODY MASS INDEX: 33.6 KG/M2 | WEIGHT: 189.63 LBS | HEIGHT: 63 IN | HEART RATE: 78 BPM | OXYGEN SATURATION: 96 %

## 2024-04-11 DIAGNOSIS — I10 HYPERTENSION, ESSENTIAL: ICD-10-CM

## 2024-04-11 DIAGNOSIS — R05.9 COUGH, UNSPECIFIED TYPE: Primary | ICD-10-CM

## 2024-04-11 PROCEDURE — 99999 PR PBB SHADOW E&M-EST. PATIENT-LVL IV: CPT | Mod: PBBFAC,,, | Performed by: FAMILY MEDICINE

## 2024-04-11 PROCEDURE — 3288F FALL RISK ASSESSMENT DOCD: CPT | Mod: CPTII,S$GLB,, | Performed by: FAMILY MEDICINE

## 2024-04-11 PROCEDURE — 3008F BODY MASS INDEX DOCD: CPT | Mod: CPTII,S$GLB,, | Performed by: FAMILY MEDICINE

## 2024-04-11 PROCEDURE — 1159F MED LIST DOCD IN RCRD: CPT | Mod: CPTII,S$GLB,, | Performed by: FAMILY MEDICINE

## 2024-04-11 PROCEDURE — 1101F PT FALLS ASSESS-DOCD LE1/YR: CPT | Mod: CPTII,S$GLB,, | Performed by: FAMILY MEDICINE

## 2024-04-11 PROCEDURE — 1160F RVW MEDS BY RX/DR IN RCRD: CPT | Mod: CPTII,S$GLB,, | Performed by: FAMILY MEDICINE

## 2024-04-11 PROCEDURE — 99213 OFFICE O/P EST LOW 20 MIN: CPT | Mod: S$GLB,,, | Performed by: FAMILY MEDICINE

## 2024-04-11 PROCEDURE — 4010F ACE/ARB THERAPY RXD/TAKEN: CPT | Mod: CPTII,S$GLB,, | Performed by: FAMILY MEDICINE

## 2024-04-12 ENCOUNTER — PATIENT MESSAGE (OUTPATIENT)
Dept: FAMILY MEDICINE | Facility: CLINIC | Age: 64
End: 2024-04-12
Payer: MEDICARE

## 2024-04-12 ENCOUNTER — OFFICE VISIT (OUTPATIENT)
Dept: OBSTETRICS AND GYNECOLOGY | Facility: CLINIC | Age: 64
End: 2024-04-12
Payer: MEDICARE

## 2024-04-12 VITALS
WEIGHT: 192.88 LBS | SYSTOLIC BLOOD PRESSURE: 128 MMHG | HEIGHT: 63 IN | DIASTOLIC BLOOD PRESSURE: 74 MMHG | BODY MASS INDEX: 34.18 KG/M2 | RESPIRATION RATE: 17 BRPM

## 2024-04-12 DIAGNOSIS — N76.4 VULVAR ABSCESS: Primary | ICD-10-CM

## 2024-04-12 DIAGNOSIS — N90.89 VULVAR LESION: ICD-10-CM

## 2024-04-12 DIAGNOSIS — N95.1 VASOMOTOR SYMPTOMS DUE TO MENOPAUSE: ICD-10-CM

## 2024-04-12 PROCEDURE — 1159F MED LIST DOCD IN RCRD: CPT | Mod: CPTII,S$GLB,, | Performed by: GENERAL PRACTICE

## 2024-04-12 PROCEDURE — 88305 TISSUE EXAM BY PATHOLOGIST: CPT | Mod: 26,,, | Performed by: PATHOLOGY

## 2024-04-12 PROCEDURE — 88305 TISSUE EXAM BY PATHOLOGIST: CPT | Performed by: PATHOLOGY

## 2024-04-12 PROCEDURE — 87070 CULTURE OTHR SPECIMN AEROBIC: CPT | Performed by: GENERAL PRACTICE

## 2024-04-12 PROCEDURE — 3008F BODY MASS INDEX DOCD: CPT | Mod: CPTII,S$GLB,, | Performed by: GENERAL PRACTICE

## 2024-04-12 PROCEDURE — 3074F SYST BP LT 130 MM HG: CPT | Mod: CPTII,S$GLB,, | Performed by: GENERAL PRACTICE

## 2024-04-12 PROCEDURE — 87075 CULTR BACTERIA EXCEPT BLOOD: CPT | Performed by: GENERAL PRACTICE

## 2024-04-12 PROCEDURE — 1101F PT FALLS ASSESS-DOCD LE1/YR: CPT | Mod: CPTII,S$GLB,, | Performed by: GENERAL PRACTICE

## 2024-04-12 PROCEDURE — 10060 I&D ABSCESS SIMPLE/SINGLE: CPT | Mod: S$GLB,,, | Performed by: GENERAL PRACTICE

## 2024-04-12 PROCEDURE — 4010F ACE/ARB THERAPY RXD/TAKEN: CPT | Mod: CPTII,S$GLB,, | Performed by: GENERAL PRACTICE

## 2024-04-12 PROCEDURE — 99999 PR PBB SHADOW E&M-EST. PATIENT-LVL III: CPT | Mod: PBBFAC,,, | Performed by: GENERAL PRACTICE

## 2024-04-12 PROCEDURE — 3288F FALL RISK ASSESSMENT DOCD: CPT | Mod: CPTII,S$GLB,, | Performed by: GENERAL PRACTICE

## 2024-04-12 PROCEDURE — 99204 OFFICE O/P NEW MOD 45 MIN: CPT | Mod: 25,S$GLB,, | Performed by: GENERAL PRACTICE

## 2024-04-12 PROCEDURE — 3078F DIAST BP <80 MM HG: CPT | Mod: CPTII,S$GLB,, | Performed by: GENERAL PRACTICE

## 2024-04-12 PROCEDURE — 87076 CULTURE ANAEROBE IDENT EACH: CPT | Mod: 59 | Performed by: GENERAL PRACTICE

## 2024-04-12 PROCEDURE — 56605 BIOPSY OF VULVA/PERINEUM: CPT | Mod: 51,S$GLB,, | Performed by: GENERAL PRACTICE

## 2024-04-12 RX ORDER — DOXYCYCLINE 100 MG/1
100 CAPSULE ORAL 2 TIMES DAILY
Qty: 14 CAPSULE | Refills: 0 | Status: SHIPPED | OUTPATIENT
Start: 2024-04-12 | End: 2024-04-19

## 2024-04-12 RX ORDER — ESTRADIOL 0.1 MG/D
1 FILM, EXTENDED RELEASE TRANSDERMAL
Qty: 24 PATCH | Refills: 3 | Status: SHIPPED | OUTPATIENT
Start: 2024-04-15 | End: 2025-04-15

## 2024-04-12 NOTE — PROGRESS NOTES
HISTORY OF THE PRESENT ILLNESS    2024  Paula Hanna is a 64 y.o. here for evaluation of two bumps on her bottom.  First one on inside - there about a month.  PCM had ordered doxycycline (no exam).  She started the Abx but didn't finish them.  Then developed another bump that is more like a boil just this past week - started draining on its own.  Resumed the doxycycline.  Hasn't had either before.    Also asking for Rx for estradiol patch (specifically the Vivelle-Dot) instead of the tablet.  Has used before and prefers this method.    G'sP's:   LMP: RODOLFO  for pain, still has ovaries  Relationship:  9yrs and sexually active  Contraception: post-hysterectomy    PAP (DEC 2014) = neg    HRT: yes, estradiol   BLEEDING: denies  MMG (2023) = neg     PAST MEDICAL HISTORY  HTN  BMI 34  HLD  Anxiety  GERD  Psoriasis  Back pain    PAST SURGICAL HISTORY  RODOLFO  done for pain, still has her ovaries  Knee  Cholecystectomy  BTL  Tonsils  Wrist   Ileocolectomy     ALLERGIES  Review of patient's allergies indicates:   Allergen Reactions    Statins-hmg-coa reductase inhibitors Other (See Comments)     Muscle and joint pain      Lisinopril Other (See Comments)     COUGH, PATIENT ADVICE [facility]    Milk containing products (dairy)     Naproxen Itching    Penicillins Itching     Pt states can take Keflex    Sulfa (sulfonamide antibiotics) Itching       MEDICATIONS  Current Outpatient Medications   Medication Instructions    ALPRAZolam (XANAX) 0.5 mg, Oral, Nightly PRN    aspirin (ECOTRIN) 81 mg, Oral, Daily    azelastine (ASTELIN) 137 mcg, Nasal, 2 times daily    carvediloL (COREG) 6.25 mg, Oral, 2 times daily with meals    EScitalopram oxalate (LEXAPRO) 10 mg, Oral    estradioL (ESTRACE) 1 MG tablet TAKE 1 TABLET(1 MG) BY MOUTH EVERY DAY    fluticasone propionate (FLONASE) 50 mcg, Each Nostril, Daily    ibuprofen (ADVIL,MOTRIN) 800 MG tablet TAKE 1 TABLET(800 MG) BY MOUTH THREE TIMES DAILY  EVERY 8 HOURS AS NEEDED FOR PAIN    levocetirizine (XYZAL) 5 mg, Oral, Nightly    losartan (COZAAR) 25 mg, Oral, Daily    magnesium oxide (MAG-OX) 400 mg, Oral, Daily    meloxicam (MOBIC) 15 mg, Oral, Daily    pantoprazole (PROTONIX) 40 mg, Oral, Daily    polyethylene glycol (GLYCOLAX) 17 g, Oral, Daily    spironolactone (ALDACTONE) 25 mg, Oral, Daily    tiZANidine (ZANAFLEX) 4 mg, Oral, 2 times daily       GYNECOLOGIC HISTORY  PAP'S: no h/o abnormals    Genital HSV: no  Other STI'S: no past history    Menarche: 12 yoa    Non-menstrual pelvic pressure/pain: No  Dyspareunia: No    Daytime hot flashes: 1 / Nighttime hot flashes: 1  Vaginal dryness: yes    OBSTETRIC HISTORY  Living children: 2  Vaginal deliveries: 3  Stillbirth >20wks: 1    SOCIAL HISTORY  Lives with:  and grandchild  Smoker: non-smoker  Alcohol: denies  Drugs: denies  Domestic Violence: no  Occupation: homemaker      --------------------------------------------------------------------------------------------------------------    PHYSICAL EXAM  VITALS:  Vitals:    04/12/24 0957   BP: 128/74   Resp: 17       GEN = alert/oriented, nad, pleasant  HEENT = sclera anicteric, EOM grossly normal   =      External:  nefg, in right gluteal fold there is a draining abscess ~2cm by palpation, on medial left labia majora there is a 4mm raised white nodule c/w epidermal inclusion cyst (patient desires removal)     Vagina: normal and without lesions and mildly atrophied and without lesions     Discharge: minimal and white     Cervix: surgically absent        Informed Consent Discussion:  -Risks related to the procedure: bleeding, infection, pain, scarring.  -Benefit/purpose to procedure: drain abscess to relieve pain and prevent progression, obtain culture to guide antibiotic selection, removal of bothersome lesion  -Alternative: continue with sitz baths, antibiotics only, do nothing.  This could prolong the problem, allow it to progress / worsen, and/or  fail to improve condition.    Site Verification:  Proper patient, procedure, and site were confirmed prior to starting.    Procedures:   ABSCESS: Area cleaned with iodine.  1% lidocaine with epi injected for anesthesia.  11-blade scalpel used to extend the pre-existing opening ~7mm in length over the center of the lesion.  Wound culture obtained.  Minimal purulence noted.  Silver nitrate and pressure achieved hemostasis.  The patient tolerated the procedure well.  EBL minimal    VULVAR BIOPSY:  Biopsy site(s): left labial cyst.  Area(s) cleaned with iodine.  1% lidocaine with 1:100,000 epinephrine injected for anesthesia.  5mm punch biopsy instrument used to obtain the specimen(s).  Bleeding controlled with application of silver nitrate.  The patient tolerated the procedure well.      ASSESSMENT AND PLAN:  64 y.o. s/p I&D of vulvar abscess and punch biopsy for excision of bothersome labial lesion.  Requesting change to HRT (pill to patch).    return precautions reviewed  pelvic rest x 4wks or until biopsy site(s) healed  encouraged use of peribottle or sitz bath to aid in debridement and healing  will contact patient with biopsy results  labs: f/u wound culture  Rx: doxycycline x 7d    Advised PAP testing not indicated but should have pelvic exam periodically    MMG up to date    Rx for estradiol patch and d/c'ed estradiol tablet  RTC for periodic GYN exam, sooner prn    MD JOSEPH

## 2024-04-14 RX ORDER — AMLODIPINE BESYLATE 2.5 MG/1
2.5 TABLET ORAL DAILY
Qty: 30 TABLET | Refills: 2 | Status: SHIPPED | OUTPATIENT
Start: 2024-04-14 | End: 2025-04-14

## 2024-04-14 NOTE — PROGRESS NOTES
Subjective:       Patient ID: Paula Hanna is a 64 y.o. female.    Chief Complaint: Follow-up and Cough    Coughing especially at night since she went on the lisinopril using multiple over-the-counter agents.  Dry cough.  No history of asthma.  No sputum no fever.  Hypertension is controlled.  Started lisinopril early March.  Discontinued it on April 2nd but still coughing.  On her Coreg.  She was change to losartan.    Physical examination.  Vital signs noted.  Chest clear.  Heart regular rate rhythm.  Extremities without edema.        Objective:        Assessment:       1. Cough, unspecified type    2. Hypertension, essential    3. BMI 33.0-33.9,adult        Plan:       Cough, unspecified type    Hypertension, essential    BMI 33.0-33.9,adult    Other orders  -     amLODIPine (NORVASC) 2.5 MG tablet; Take 1 tablet (2.5 mg total) by mouth once daily.  Dispense: 30 tablet; Refill: 2      Cough still most likely due to ACE inhibitor.  Delsym p.r.n..  Discontinue losartan for now.  Try Norvasc 2.5 mg daily 30 with 2 refills.  Follow-up in 3 weeks.

## 2024-04-15 LAB — BACTERIA SPEC AEROBE CULT: NORMAL

## 2024-04-16 LAB
FINAL PATHOLOGIC DIAGNOSIS: NORMAL
GROSS: NORMAL
Lab: NORMAL

## 2024-04-16 NOTE — PROGRESS NOTES
Mrs. Mitchell,    The biopsy result is benign (no cancer or pre-cancer).  It was a little inclusion cyst like I had suspected.    Sincerely,  Dr. Weston

## 2024-04-17 LAB
BACTERIA SPEC ANAEROBE CULT: ABNORMAL
BACTERIA SPEC ANAEROBE CULT: ABNORMAL

## 2024-04-18 ENCOUNTER — PATIENT MESSAGE (OUTPATIENT)
Dept: ADMINISTRATIVE | Facility: CLINIC | Age: 64
End: 2024-04-18
Payer: MEDICARE

## 2024-04-19 ENCOUNTER — TELEPHONE (OUTPATIENT)
Dept: ADMINISTRATIVE | Facility: CLINIC | Age: 64
End: 2024-04-19
Payer: MEDICARE

## 2024-04-22 ENCOUNTER — OFFICE VISIT (OUTPATIENT)
Dept: FAMILY MEDICINE | Facility: CLINIC | Age: 64
End: 2024-04-22
Payer: MEDICARE

## 2024-04-22 VITALS — BODY MASS INDEX: 34.02 KG/M2 | WEIGHT: 192 LBS | HEIGHT: 63 IN

## 2024-04-22 DIAGNOSIS — Z79.82 ASPIRIN LONG-TERM USE: ICD-10-CM

## 2024-04-22 DIAGNOSIS — J30.9 ALLERGIC RHINITIS, UNSPECIFIED SEASONALITY, UNSPECIFIED TRIGGER: ICD-10-CM

## 2024-04-22 DIAGNOSIS — L40.9 PSORIASIS: ICD-10-CM

## 2024-04-22 DIAGNOSIS — K21.9 GASTROESOPHAGEAL REFLUX DISEASE, UNSPECIFIED WHETHER ESOPHAGITIS PRESENT: ICD-10-CM

## 2024-04-22 DIAGNOSIS — E66.09 CLASS 1 OBESITY DUE TO EXCESS CALORIES WITH SERIOUS COMORBIDITY AND BODY MASS INDEX (BMI) OF 34.0 TO 34.9 IN ADULT: ICD-10-CM

## 2024-04-22 DIAGNOSIS — Z00.00 ENCOUNTER FOR PREVENTIVE HEALTH EXAMINATION: Primary | ICD-10-CM

## 2024-04-22 DIAGNOSIS — G47.09 OTHER INSOMNIA: ICD-10-CM

## 2024-04-22 DIAGNOSIS — L40.50 PSORIATIC ARTHRITIS: ICD-10-CM

## 2024-04-22 DIAGNOSIS — R26.9 ABNORMALITY OF GAIT AND MOBILITY: ICD-10-CM

## 2024-04-22 DIAGNOSIS — J34.3 HYPERTROPHY OF INFERIOR NASAL TURBINATE: ICD-10-CM

## 2024-04-22 DIAGNOSIS — K59.00 CONSTIPATION, UNSPECIFIED CONSTIPATION TYPE: ICD-10-CM

## 2024-04-22 DIAGNOSIS — I10 HYPERTENSION, ESSENTIAL: ICD-10-CM

## 2024-04-22 DIAGNOSIS — N28.1 RENAL CYST, LEFT: ICD-10-CM

## 2024-04-22 DIAGNOSIS — M51.26 HERNIATED LUMBAR DISC WITHOUT MYELOPATHY: ICD-10-CM

## 2024-04-22 DIAGNOSIS — J40 BRONCHITIS: ICD-10-CM

## 2024-04-22 DIAGNOSIS — F41.9 ANXIETY: ICD-10-CM

## 2024-04-22 PROBLEM — E66.811 CLASS 1 OBESITY DUE TO EXCESS CALORIES WITH SERIOUS COMORBIDITY AND BODY MASS INDEX (BMI) OF 34.0 TO 34.9 IN ADULT: Status: ACTIVE | Noted: 2023-12-29

## 2024-04-22 PROCEDURE — 4010F ACE/ARB THERAPY RXD/TAKEN: CPT | Mod: CPTII,95,,

## 2024-04-22 PROCEDURE — 1159F MED LIST DOCD IN RCRD: CPT | Mod: CPTII,95,,

## 2024-04-22 PROCEDURE — G0439 PPPS, SUBSEQ VISIT: HCPCS | Mod: 95,,,

## 2024-04-22 PROCEDURE — 1101F PT FALLS ASSESS-DOCD LE1/YR: CPT | Mod: CPTII,95,,

## 2024-04-22 PROCEDURE — 1158F ADVNC CARE PLAN TLK DOCD: CPT | Mod: CPTII,95,,

## 2024-04-22 PROCEDURE — 1160F RVW MEDS BY RX/DR IN RCRD: CPT | Mod: CPTII,95,,

## 2024-04-22 PROCEDURE — 3288F FALL RISK ASSESSMENT DOCD: CPT | Mod: CPTII,95,,

## 2024-04-22 NOTE — PATIENT INSTRUCTIONS
Counseling and Referral of Other Preventative  (Italic type indicates deductible and co-insurance are waived)    Patient Name: Paula Hanna  Today's Date: 4/22/2024    Health Maintenance       Date Due Completion Date    Mammogram 06/02/2024 6/2/2023    COVID-19 Vaccine (4 - 2023-24 season) 05/31/2024 (Originally 9/1/2023) 11/19/2021    Pneumococcal Vaccines (Age 65+) (1 of 1 - PCV) 05/31/2024 (Originally 1/10/2024) ---    RSV Vaccine (Age 60+ and Pregnant patients) (1 - 1-dose 60+ series) 06/30/2024 (Originally 1/10/2019) ---    Colorectal Cancer Screening 12/17/2024 12/17/2019    Hemoglobin A1c (Diabetic Prevention Screening) 06/19/2026 6/19/2023    DEXA Scan 02/21/2027 2/21/2024    Lipid Panel 06/19/2028 6/19/2023    TETANUS VACCINE 03/24/2032 3/24/2022        No orders of the defined types were placed in this encounter.    The following information is provided to all patients.  This information is to help you find resources for any of the problems found today that may be affecting your health:                  Living healthy guide: www.[facility].[place].gov      Understanding Diabetes: www.diabetes.org      Eating healthy: www.cdc.gov/healthyweight      CDC home safety checklist: www.cdc.gov/steadi/patient.html      Agency on Aging: www.goea.[place].gov      Alcoholics anonymous (AA): www.aa.org      Physical Activity: www.rubia.nih.gov/ow0zllg      Tobacco use: www.quitwithusla.org

## 2024-04-22 NOTE — PROGRESS NOTES
"    The patient location is: [place]  The chief complaint leading to consultation is: Medicare AWV    Visit type: audiovisual    Face to Face time with patient: 21  60 minutes of total time spent on the encounter, which includes face to face time and non-face to face time preparing to see the patient (eg, review of tests), Obtaining and/or reviewing separately obtained history, Documenting clinical information in the electronic or other health record, Independently interpreting results (not separately reported) and communicating results to the patient/family/caregiver, or Care coordination (not separately reported).         Each patient to whom he or she provides medical services by telemedicine is:  (1) informed of the relationship between the physician and patient and the respective role of any other health care provider with respect to management of the patient; and (2) notified that he or she may decline to receive medical services by telemedicine and may withdraw from such care at any time.    Notes:       Paula Hanna presented for a  Medicare AWV and comprehensive Health Risk Assessment today. The following components were reviewed and updated:    Medical history  Family History  Social history  Allergies and Current Medications  Health Risk Assessment  Health Maintenance  Care Team         ** See Completed Assessments for Annual Wellness Visit within the encounter summary.**         The following assessments were completed:  Living Situation  CAGE  Depression Screening  Fall Risk Assessment (MACH 10)  Hearing Assessment(HHI)  Cognitive Function Screening  Nutrition Screening  ADL Screening  PAQ Screening    Opioid documentation:      Patient does not have a current opioid prescription.        Vitals:    04/22/24 0903   Weight: 87.1 kg (192 lb)   Height: 5' 3" (1.6 m)     Body mass index is 34.01 kg/m².  Physical Exam  Constitutional:       General: She is not in acute distress.     Appearance: Normal " appearance. She is well-developed and well-groomed.   Skin:     Coloration: Skin is not pale.   Neurological:      Mental Status: She is alert and oriented to person, place, and time.   Psychiatric:         Mood and Affect: Mood normal.         Speech: Speech normal.         Behavior: Behavior normal. Behavior is cooperative.         Thought Content: Thought content normal.               Diagnoses and health risks identified today and associated recommendations/orders:    1. Encounter for preventive health examination  Screenings performed, as noted above. Personal preventative testing needs reviewed.     2. Psoriatic arthritis  Chronic; stable on NSAIDS. Followed by PCP.    3. Psoriasis  Chronic. Stable. Followed by PCP.     4. Herniated lumbar disc without myelopathy  Chronic; stable on NSAIDS. Followed by Orthopedics.    5. Anxiety  Chronic; stable on alprazolam and escitalopram. Followed by PCP.      6. Allergic rhinitis, unspecified seasonality, unspecified trigger  7. Hypertrophy of inferior nasal turbinate  Chronic; stable on azelastine, flonase, levocetirizine. Followed by PCP.    8. Bronchitis  Chronic; stable. Followed by PCP.    9. Hypertension, essential  Chronic; stable on losartan and carvedilol. Followed by PCP.    10. Renal cyst, left  Chronic; stable. Followed by PCP.    11. Aspirin long-term use  Chronic; stable. Followed by PCP.    12. Class 1 obesity due to excess calories with serious comorbidity and body mass index (BMI) of 34.0 to 34.9 in adult  Work on diet modification and increase in physical activity as tolerated.   Followed by PCP.     13. Constipation, unspecified constipation type  Chronic; stable on miralax prn. Followed by GI.     14. Gastroesophageal reflux disease, unspecified whether esophagitis present  Chronic; stable on pantoprazole. Followed by GI.     15. Other insomnia  Chronic; stable on alprazolam prn. Followed by PCP.    16. Abnormality of gait and mobility  Continue to  use ambulatory assistive devices prn. Followed by Orthopedics.       Provided Paula with a 5-10 year written screening schedule and personal prevention plan. Recommendations were developed using the USPSTF age appropriate recommendations. Education, counseling, and referrals were provided as needed. After Visit Summary printed and given to patient which includes a list of additional screenings\tests needed.    Follow up in about 1 year (around 4/22/2025) for your next annual wellness visit.    Elisa Britton NP      Advance Care Planning     I offered to discuss advanced care planning, including how to pick a person who would make decisions for you if you were unable to make them for yourself, called a health care power of , and what kind of decisions you might make such as use of life sustaining treatments such as ventilators and tube feeding when faced with a life limiting illness recorded on a living will that they will need to know. (How you want to be cared for as you near the end of your natural life)     X Patient is interested in learning more about how to make advanced directives.  I provided them paperwork and offered to discuss this with them.

## 2024-05-02 ENCOUNTER — OFFICE VISIT (OUTPATIENT)
Dept: FAMILY MEDICINE | Facility: CLINIC | Age: 64
End: 2024-05-02
Payer: MEDICARE

## 2024-05-02 VITALS
TEMPERATURE: 98 F | OXYGEN SATURATION: 97 % | BODY MASS INDEX: 34.2 KG/M2 | RESPIRATION RATE: 18 BRPM | HEART RATE: 71 BPM | WEIGHT: 193 LBS | HEIGHT: 63 IN | DIASTOLIC BLOOD PRESSURE: 92 MMHG | SYSTOLIC BLOOD PRESSURE: 150 MMHG

## 2024-05-02 DIAGNOSIS — I10 HYPERTENSION, ESSENTIAL: Primary | ICD-10-CM

## 2024-05-02 DIAGNOSIS — H92.01 PAIN OF RIGHT MASTOID: ICD-10-CM

## 2024-05-02 DIAGNOSIS — K21.9 GASTROESOPHAGEAL REFLUX DISEASE, UNSPECIFIED WHETHER ESOPHAGITIS PRESENT: ICD-10-CM

## 2024-05-02 DIAGNOSIS — R05.9 COUGH, UNSPECIFIED TYPE: ICD-10-CM

## 2024-05-02 PROCEDURE — 99214 OFFICE O/P EST MOD 30 MIN: CPT | Mod: S$GLB,,, | Performed by: FAMILY MEDICINE

## 2024-05-02 PROCEDURE — G2211 COMPLEX E/M VISIT ADD ON: HCPCS | Mod: S$GLB,,, | Performed by: FAMILY MEDICINE

## 2024-05-02 PROCEDURE — 1160F RVW MEDS BY RX/DR IN RCRD: CPT | Mod: CPTII,S$GLB,, | Performed by: FAMILY MEDICINE

## 2024-05-02 PROCEDURE — 1101F PT FALLS ASSESS-DOCD LE1/YR: CPT | Mod: CPTII,S$GLB,, | Performed by: FAMILY MEDICINE

## 2024-05-02 PROCEDURE — 1159F MED LIST DOCD IN RCRD: CPT | Mod: CPTII,S$GLB,, | Performed by: FAMILY MEDICINE

## 2024-05-02 PROCEDURE — 3077F SYST BP >= 140 MM HG: CPT | Mod: CPTII,S$GLB,, | Performed by: FAMILY MEDICINE

## 2024-05-02 PROCEDURE — 4010F ACE/ARB THERAPY RXD/TAKEN: CPT | Mod: CPTII,S$GLB,, | Performed by: FAMILY MEDICINE

## 2024-05-02 PROCEDURE — 3080F DIAST BP >= 90 MM HG: CPT | Mod: CPTII,S$GLB,, | Performed by: FAMILY MEDICINE

## 2024-05-02 PROCEDURE — 3008F BODY MASS INDEX DOCD: CPT | Mod: CPTII,S$GLB,, | Performed by: FAMILY MEDICINE

## 2024-05-02 PROCEDURE — 3288F FALL RISK ASSESSMENT DOCD: CPT | Mod: CPTII,S$GLB,, | Performed by: FAMILY MEDICINE

## 2024-05-02 PROCEDURE — 99999 PR PBB SHADOW E&M-EST. PATIENT-LVL V: CPT | Mod: PBBFAC,,, | Performed by: FAMILY MEDICINE

## 2024-05-02 RX ORDER — PANTOPRAZOLE SODIUM 40 MG/1
40 TABLET, DELAYED RELEASE ORAL 2 TIMES DAILY
Qty: 60 TABLET | Refills: 2 | Status: SHIPPED | OUTPATIENT
Start: 2024-05-02 | End: 2025-05-02

## 2024-05-02 RX ORDER — LOSARTAN POTASSIUM 25 MG/1
25 TABLET ORAL DAILY
Qty: 30 TABLET | Refills: 2 | Status: SHIPPED | OUTPATIENT
Start: 2024-05-02

## 2024-05-02 RX ORDER — CEFUROXIME AXETIL 250 MG/1
250 TABLET ORAL 2 TIMES DAILY
Qty: 20 TABLET | Refills: 0 | Status: SHIPPED | OUTPATIENT
Start: 2024-05-02

## 2024-05-02 NOTE — PROGRESS NOTES
SUBJECTIVE:    Patient ID: Paula Hanna is a 64 y.o. female.    Chief Complaint: Follow-up (3 week), Cough, and Sinus Problem    Paula Hanna is a 64 y.o. female with Medical History of HTN, GERD who presents for a hypertension follow-up and sinus issues. Hypertension is slightly elevated. Taking Amlodipine. Has been off of Lisinopril for the past month and Losartan for the past 3 weeks. On Coreg for about 1 month. Has had a persistent productive cough (clear mucus) since the past weekend throughout the day and night. States that she has been coughing so much that she chokes and vomits and has wheezing after she coughs. Has been taking Ricola cough drops. States right mastoid pain, but no longer present since yesterday, as she has been taking 800 mg Ibuprofen. No fever. No SOB. No heart burn. Not taking reflux medication consistently. No PMHx or FHx of asthma. BMI is 34.19 (weight stable).          Office Visit on 04/12/2024   Component Date Value Ref Range Status    Final Pathologic Diagnosis 04/12/2024 Vulvar biopsy shows an epidermal inclusion cyst.   Final    Gross 04/12/2024    Final                    Value:Pathology ID:  [id number]  Patient ID:  [id number]  The specimen is received in formalin labeled &quot;medial left labia majora&quot;.  The specimen consists of one punch biopsy of tan skin measuring 0.6 x 0.4 cm and excised to a depth of 0.3 cm .  The specimen is bisected,  inked blue at the resection   margin and submitted entirely in cassette [id number]      Disclaimer 04/12/2024 Unless the case is a 'gross only' or additional testing only, the final diagnosis for each specimen is based on a microscopic examination of appropriate tissue sections.   Final    Anaerobic Culture 04/12/2024  (A)   Final                    Value:PREVOTELLA BERGENSIS  Rare      Anaerobic Culture 04/12/2024  (A)   Final                    Value:PROPIONIBACTERIUM AVIDUM  Rare      Aerobic Bacterial  Culture 04/12/2024 Skin yi,  no predominant organism   Final   Admission on 03/18/2024, Discharged on 03/18/2024   Component Date Value Ref Range Status    QRS Duration 03/18/2024 156  ms Final    [facility] QTC Calculation 03/18/2024 513  ms Final    WBC 03/18/2024 8.04  3.90 - 12.70 K/uL Final    RBC 03/18/2024 4.68  4.00 - 5.40 M/uL Final    Hemoglobin 03/18/2024 13.6  12.0 - 16.0 g/dL Final    Hematocrit 03/18/2024 42.1  37.0 - 48.5 % Final    MCV 03/18/2024 90  82 - 98 fL Final    MCH 03/18/2024 29.1  27.0 - 31.0 pg Final    MCHC 03/18/2024 32.3  32.0 - 36.0 g/dL Final    RDW 03/18/2024 15.0 (H)  11.5 - 14.5 % Final    Platelets 03/18/2024 245  150 - 450 K/uL Final    MPV 03/18/2024 10.5  9.2 - 12.9 fL Final    Immature Granulocytes 03/18/2024 0.4  0.0 - 0.5 % Final    Gran # (ANC) 03/18/2024 4.2  1.8 - 7.7 K/uL Final    Immature Grans (Abs) 03/18/2024 0.03  0.00 - 0.04 K/uL Final    Lymph # 03/18/2024 3.0  1.0 - 4.8 K/uL Final    Mono # 03/18/2024 0.7  0.3 - 1.0 K/uL Final    Eos # 03/18/2024 0.1  0.0 - 0.5 K/uL Final    Baso # 03/18/2024 0.05  0.00 - 0.20 K/uL Final    nRBC 03/18/2024 0  0 /100 WBC Final    Gran % 03/18/2024 51.6  38.0 - 73.0 % Final    Lymph % 03/18/2024 37.7  18.0 - 48.0 % Final    Mono % 03/18/2024 8.7  4.0 - 15.0 % Final    Eosinophil % 03/18/2024 1.0  0.0 - 8.0 % Final    Basophil % 03/18/2024 0.6  0.0 - 1.9 % Final    Differential Method 03/18/2024 Automated   Final    Sodium 03/18/2024 141  136 - 145 mmol/L Final    Potassium 03/18/2024 3.4 (L)  3.5 - 5.1 mmol/L Final    Chloride 03/18/2024 103  95 - 110 mmol/L Final    CO2 03/18/2024 31 (H)  23 - 29 mmol/L Final    Glucose 03/18/2024 112 (H)  70 - 110 mg/dL Final    BUN 03/18/2024 20  8 - 23 mg/dL Final    Creatinine 03/18/2024 1.1  0.5 - 1.4 mg/dL Final    Calcium 03/18/2024 9.6  8.7 - 10.5 mg/dL Final    Anion Gap 03/18/2024 7 (L)  8 - 16 mmol/L Final    eGFR 03/18/2024 55.8 (A)  >60 mL/min/1.73 m^2 Final    POC Glucose 03/18/2024 104   70 - 110 Final   Admission on 03/16/2024, Discharged on 03/16/2024   Component Date Value Ref Range Status    WBC 03/16/2024 12.19  3.90 - 12.70 K/uL Final    RBC 03/16/2024 4.35  4.00 - 5.40 M/uL Final    Hemoglobin 03/16/2024 12.7  12.0 - 16.0 g/dL Final    Hematocrit 03/16/2024 38.9  37.0 - 48.5 % Final    MCV 03/16/2024 89  82 - 98 fL Final    MCH 03/16/2024 29.2  27.0 - 31.0 pg Final    MCHC 03/16/2024 32.6  32.0 - 36.0 g/dL Final    RDW 03/16/2024 15.1 (H)  11.5 - 14.5 % Final    Platelets 03/16/2024 236  150 - 450 K/uL Final    MPV 03/16/2024 10.4  9.2 - 12.9 fL Final    Immature Granulocytes 03/16/2024 0.4  0.0 - 0.5 % Final    Gran # (ANC) 03/16/2024 6.7  1.8 - 7.7 K/uL Final    Immature Grans (Abs) 03/16/2024 0.05 (H)  0.00 - 0.04 K/uL Final    Lymph # 03/16/2024 4.6  1.0 - 4.8 K/uL Final    Mono # 03/16/2024 0.8  0.3 - 1.0 K/uL Final    Eos # 03/16/2024 0.0  0.0 - 0.5 K/uL Final    Baso # 03/16/2024 0.04  0.00 - 0.20 K/uL Final    nRBC 03/16/2024 0  0 /100 WBC Final    Gran % 03/16/2024 54.8  38.0 - 73.0 % Final    Lymph % 03/16/2024 37.7  18.0 - 48.0 % Final    Mono % 03/16/2024 6.6  4.0 - 15.0 % Final    Eosinophil % 03/16/2024 0.2  0.0 - 8.0 % Final    Basophil % 03/16/2024 0.3  0.0 - 1.9 % Final    Platelet Estimate 03/16/2024 Appears normal   Final    Differential Method 03/16/2024 Automated   Final    Sodium 03/16/2024 140  136 - 145 mmol/L Final    Potassium 03/16/2024 3.5  3.5 - 5.1 mmol/L Final    Chloride 03/16/2024 104  95 - 110 mmol/L Final    CO2 03/16/2024 30 (H)  23 - 29 mmol/L Final    Glucose 03/16/2024 92  70 - 110 mg/dL Final    BUN 03/16/2024 22  8 - 23 mg/dL Final    Creatinine 03/16/2024 1.0  0.5 - 1.4 mg/dL Final    Calcium 03/16/2024 9.3  8.7 - 10.5 mg/dL Final    Total Protein 03/16/2024 7.0  6.0 - 8.4 g/dL Final    Albumin 03/16/2024 4.2  3.5 - 5.2 g/dL Final    Total Bilirubin 03/16/2024 0.6  0.1 - 1.0 mg/dL Final    Alkaline Phosphatase 03/16/2024 68  55 - 135 U/L Final    AST  03/16/2024 6 (L)  10 - 40 U/L Final    ALT 03/16/2024 11  10 - 44 U/L Final    eGFR 03/16/2024 >60.0  >60 mL/min/1.73 m^2 Final    Anion Gap 03/16/2024 6 (L)  8 - 16 mmol/L Final    QRS Duration 03/16/2024 144  ms Final    [facility] QTC Calculation 03/16/2024 468  ms Final   Office Visit on 03/12/2024   Component Date Value Ref Range Status    POC Rapid COVID 03/12/2024 Negative  Negative Final     Acceptable 03/12/2024 Yes   Final   Lab Visit on 12/27/2023   Component Date Value Ref Range Status    WBC 12/27/2023 4.45  3.90 - 12.70 K/uL Final    RBC 12/27/2023 4.27  4.00 - 5.40 M/uL Final    Hemoglobin 12/27/2023 12.4  12.0 - 16.0 g/dL Final    Hematocrit 12/27/2023 38.8  37.0 - 48.5 % Final    MCV 12/27/2023 91  82 - 98 fL Final    MCH 12/27/2023 29.0  27.0 - 31.0 pg Final    MCHC 12/27/2023 32.0  32.0 - 36.0 g/dL Final    RDW 12/27/2023 14.0  11.5 - 14.5 % Final    Platelets 12/27/2023 208  150 - 450 K/uL Final    MPV 12/27/2023 10.7  9.2 - 12.9 fL Final    Immature Granulocytes 12/27/2023 0.2  0.0 - 0.5 % Final    Gran # (ANC) 12/27/2023 1.9  1.8 - 7.7 K/uL Final    Immature Grans (Abs) 12/27/2023 0.01  0.00 - 0.04 K/uL Final    Lymph # 12/27/2023 1.9  1.0 - 4.8 K/uL Final    Mono # 12/27/2023 0.4  0.3 - 1.0 K/uL Final    Eos # 12/27/2023 0.2  0.0 - 0.5 K/uL Final    Baso # 12/27/2023 0.05  0.00 - 0.20 K/uL Final    nRBC 12/27/2023 0  0 /100 WBC Final    Gran % 12/27/2023 43.4  38.0 - 73.0 % Final    Lymph % 12/27/2023 42.5  18.0 - 48.0 % Final    Mono % 12/27/2023 9.2  4.0 - 15.0 % Final    Eosinophil % 12/27/2023 3.6  0.0 - 8.0 % Final    Basophil % 12/27/2023 1.1  0.0 - 1.9 % Final    Differential Method 12/27/2023 Automated   Final    Sodium 12/27/2023 141  136 - 145 mmol/L Final    Potassium 12/27/2023 3.9  3.5 - 5.1 mmol/L Final    Chloride 12/27/2023 107  95 - 110 mmol/L Final    CO2 12/27/2023 28  23 - 29 mmol/L Final    Glucose 12/27/2023 97  70 - 110 mg/dL Final    BUN 12/27/2023 17  8 - 23  mg/dL Final    Creatinine 12/27/2023 0.9  0.5 - 1.4 mg/dL Final    Calcium 12/27/2023 8.9  8.7 - 10.5 mg/dL Final    Total Protein 12/27/2023 7.0  6.0 - 8.4 g/dL Final    Albumin 12/27/2023 4.3  3.5 - 5.2 g/dL Final    Total Bilirubin 12/27/2023 0.6  0.1 - 1.0 mg/dL Final    Alkaline Phosphatase 12/27/2023 76  55 - 135 U/L Final    AST 12/27/2023 16  10 - 40 U/L Final    ALT 12/27/2023 14  10 - 44 U/L Final    eGFR 12/27/2023 >60.0  >60 mL/min/1.73 m^2 Final    Anion Gap 12/27/2023 6 (L)  8 - 16 mmol/L Final    Uric Acid 12/27/2023 5.5  2.4 - 5.7 mg/dL Final   Lab Visit on 06/22/2023   Component Date Value Ref Range Status    Magnesium 06/22/2023 2.2  1.6 - 2.6 mg/dL Final   Lab Visit on 06/19/2023   Component Date Value Ref Range Status    Hemoglobin A1C 06/19/2023 5.7  4.5 - 6.2 % Final    Estimated Avg Glucose 06/19/2023 117  68 - 131 mg/dL Final    WBC 06/19/2023 5.92  3.90 - 12.70 K/uL Final    RBC 06/19/2023 4.14  4.00 - 5.40 M/uL Final    Hemoglobin 06/19/2023 12.0  12.0 - 16.0 g/dL Final    Hematocrit 06/19/2023 37.7  37.0 - 48.5 % Final    MCV 06/19/2023 91  82 - 98 fL Final    MCH 06/19/2023 29.0  27.0 - 31.0 pg Final    MCHC 06/19/2023 31.8 (L)  32.0 - 36.0 g/dL Final    RDW 06/19/2023 14.6 (H)  11.5 - 14.5 % Final    Platelets 06/19/2023 223  150 - 450 K/uL Final    MPV 06/19/2023 10.4  9.2 - 12.9 fL Final    Immature Granulocytes 06/19/2023 0.2  0.0 - 0.5 % Final    Gran # (ANC) 06/19/2023 2.3  1.8 - 7.7 K/uL Final    Immature Grans (Abs) 06/19/2023 0.01  0.00 - 0.04 K/uL Final    Lymph # 06/19/2023 2.9  1.0 - 4.8 K/uL Final    Mono # 06/19/2023 0.5  0.3 - 1.0 K/uL Final    Eos # 06/19/2023 0.2  0.0 - 0.5 K/uL Final    Baso # 06/19/2023 0.07  0.00 - 0.20 K/uL Final    nRBC 06/19/2023 0  0 /100 WBC Final    Gran % 06/19/2023 38.6  38.0 - 73.0 % Final    Lymph % 06/19/2023 49.0 (H)  18.0 - 48.0 % Final    Mono % 06/19/2023 7.8  4.0 - 15.0 % Final    Eosinophil % 06/19/2023 3.2  0.0 - 8.0 % Final     Basophil % 2023 1.2  0.0 - 1.9 % Final    Differential Method 2023 Automated   Final    Sodium 2023 139  136 - 145 mmol/L Final    Potassium 2023 4.1  3.5 - 5.1 mmol/L Final    Chloride 2023 110  95 - 110 mmol/L Final    CO2 2023 23  23 - 29 mmol/L Final    Glucose 2023 102  70 - 110 mg/dL Final    BUN 2023 20  8 - 23 mg/dL Final    Creatinine 2023 1.0  0.5 - 1.4 mg/dL Final    Calcium 2023 8.7  8.7 - 10.5 mg/dL Final    Total Protein 2023 6.8  6.0 - 8.4 g/dL Final    Albumin 2023 3.8  3.5 - 5.2 g/dL Final    Total Bilirubin 2023 0.6  0.1 - 1.0 mg/dL Final    Alkaline Phosphatase 2023 66  55 - 135 U/L Final    AST 2023 20  10 - 40 U/L Final    ALT 2023 17  10 - 44 U/L Final    Anion Gap 2023 6 (L)  8 - 16 mmol/L Final    eGFR 2023 >60.0  >60 mL/min/1.73 m^2 Final    Cholesterol 2023 166  120 - 199 mg/dL Final    Triglycerides 2023 114  30 - 150 mg/dL Final    HDL 2023 59  40 - 75 mg/dL Final    LDL Cholesterol 2023 84.2  63.0 - 159.0 mg/dL Final    HDL/Cholesterol Ratio 2023 35.5  20.0 - 50.0 % Final    Total Cholesterol/HDL Ratio 2023 2.8  2.0 - 5.0 Final    Non-HDL Cholesterol 2023 107  mg/dL Final    TSH 2023 1.960  0.340 - 5.600 uIU/mL Final       Past Medical History:   Diagnosis Date    Anxiety     Back pain     Degenerative disc disease     GERD (gastroesophageal reflux disease)     Heel spur     Hypertension     Hypertrophy of both inferior nasal turbinates     Nasal septal deviation     Psoriasis     Sciatica      Social History     Socioeconomic History    Marital status:    Tobacco Use    Smoking status: Former     Current packs/day: 0.00     Average packs/day: 0.3 packs/day for 22.0 years (5.5 ttl pk-yrs)     Types: Cigarettes     Start date: 3/1/1995     Quit date: 3/1/2017     Years since quittin.1    Smokeless tobacco: Never    Tobacco  comments:     less than 1 pack week   Substance and Sexual Activity    Alcohol use: Not Currently     Comment: socially    Drug use: No    Sexual activity: Yes     Partners: Male     Birth control/protection: None     Social Determinants of Health     Financial Resource Strain: Medium Risk (4/21/2024)    Overall Financial Resource Strain (CARDIA)     Difficulty of Paying Living Expenses: Somewhat hard   Food Insecurity: Food Insecurity Present (4/21/2024)    Hunger Vital Sign     Worried About Running Out of Food in the Last Year: Sometimes true     Ran Out of Food in the Last Year: Never true   Transportation Needs: No Transportation Needs (4/21/2024)    PRAPARE - Transportation     Lack of Transportation (Medical): No     Lack of Transportation (Non-Medical): No   Physical Activity: Inactive (4/21/2024)    Exercise Vital Sign     Days of Exercise per Week: 0 days     Minutes of Exercise per Session: 0 min   Stress: Stress Concern Present (4/21/2024)    [employer] - Occupational Stress Questionnaire     Feeling of Stress : Rather much   Housing Stability: Low Risk  (4/21/2024)    Housing Stability Vital Sign     Unable to Pay for Housing in the Last Year: No     Homeless in the Last Year: No     Past Surgical History:   Procedure Laterality Date    CHOLECYSTECTOMY  2021    COLON SURGERY      Cecal tumor/right side    HYSTERECTOMY  1999    Ileocolectomy  2000        KNEE ARTHROSCOPY W/ DEBRIDEMENT      x 2, right    LAPAROSCOPIC CHOLECYSTECTOMY N/A 05/16/2021    Procedure: CHOLECYSTECTOMY, LAPAROSCOPIC;  Surgeon: Pepe Cool MD;  Location: [facility];  Service: General;  Laterality: N/A;    LUMBAR EPIDURAL INJECTION      TONSILLECTOMY      WRIST SURGERY Right     plate     Family History   Problem Relation Name Age of Onset    Hypertension Mother Inge     Heart disease Mother Inge     Hypertension Father Wilcous     Cancer Father Wilcous     Diabetes Sister Hedy     Hypertension  Sister Hedy     Diabetes Brother Annie Miller.     Hypertension Brother Annie Miller.     Prostate cancer Brother Annie Miller.     Migraines Daughter      Diabetes Son      Ovarian cancer Neg Hx      Breast cancer Neg Hx         Review of patient's allergies indicates:   Allergen Reactions    Statins-hmg-coa reductase inhibitors Other (See Comments)     Muscle and joint pain      Lisinopril Other (See Comments)     COUGH, PATIENT ADVICE [facility] 4/2/24    Milk containing products (dairy)     Naproxen Itching    Penicillins Itching     Pt states can take Keflex    Sulfa (sulfonamide antibiotics) Itching       Current Outpatient Medications:     ALPRAZolam (XANAX) 0.5 MG tablet, Take 1 tablet (0.5 mg total) by mouth nightly as needed for Anxiety., Disp: 30 tablet, Rfl: 2    amLODIPine (NORVASC) 2.5 MG tablet, Take 1 tablet (2.5 mg total) by mouth once daily., Disp: 30 tablet, Rfl: 2    aspirin (ECOTRIN) 81 MG EC tablet, Take 81 mg by mouth once daily., Disp: , Rfl:     azelastine (ASTELIN) 137 mcg (0.1 %) nasal spray, 1 spray (137 mcg total) by Nasal route 2 (two) times daily., Disp: 90 mL, Rfl: 1    carvediloL (COREG) 6.25 MG tablet, Take 1 tablet (6.25 mg total) by mouth 2 (two) times daily with meals., Disp: 60 tablet, Rfl: 1    EScitalopram oxalate (LEXAPRO) 10 MG tablet, Take 10 mg by mouth once daily., Disp: , Rfl:     estradioL (VIVELLE-DOT) 0.1 mg/24 hr PTSW, Place 1 patch onto the skin twice a week., Disp: 24 patch, Rfl: 3    fluticasone propionate (FLONASE) 50 mcg/actuation nasal spray, 1 spray (50 mcg total) by Each Nostril route once daily., Disp: 16 g, Rfl: 5    ibuprofen (ADVIL,MOTRIN) 800 MG tablet, TAKE 1 TABLET(800 MG) BY MOUTH THREE TIMES DAILY EVERY 8 HOURS AS NEEDED FOR PAIN, Disp: 30 tablet, Rfl: 2    magnesium oxide (MAG-OX) 400 mg (241.3 mg magnesium) tablet, Take 1 tablet (400 mg total) by mouth once daily., Disp: 90 tablet, Rfl: 1    meloxicam (MOBIC) 15 MG tablet, Take 1 tablet (15 mg total) by mouth  once daily., Disp: 30 tablet, Rfl: 2    pantoprazole (PROTONIX) 40 MG tablet, Take 1 tablet (40 mg total) by mouth once daily., Disp: 90 tablet, Rfl: 1    polyethylene glycol (GLYCOLAX) 17 gram/dose powder, Take 17 g by mouth once daily. , Disp: , Rfl: 0    spironolactone (ALDACTONE) 25 MG tablet, Take 1 tablet (25 mg total) by mouth once daily., Disp: 90 tablet, Rfl: 1    tiZANidine (ZANAFLEX) 4 MG tablet, Take 1 tablet (4 mg total) by mouth 2 (two) times daily., Disp: 60 tablet, Rfl: 0    cefUROXime (CEFTIN) 250 MG tablet, Take 1 tablet (250 mg total) by mouth 2 (two) times daily., Disp: 20 tablet, Rfl: 0    levocetirizine (XYZAL) 5 MG tablet, Take 1 tablet (5 mg total) by mouth every evening. (Patient not taking: Reported on 5/2/2024), Disp: 90 tablet, Rfl: 1    losartan (COZAAR) 25 MG tablet, Take 1 tablet (25 mg total) by mouth once daily., Disp: 30 tablet, Rfl: 2    pantoprazole (PROTONIX) 40 MG tablet, Take 1 tablet (40 mg total) by mouth 2 (two) times daily., Disp: 60 tablet, Rfl: 2    Review of Systems   Constitutional: Negative.  Negative for fever.   HENT: Negative.  Negative for ear pain.    Eyes: Negative.    Respiratory:  Positive for cough, choking (secondary to cough) and wheezing (secondary to cough). Negative for shortness of breath.    Cardiovascular: Negative.    Gastrointestinal:  Positive for vomiting (secondary to cough).   Endocrine: Negative.    Genitourinary: Negative.    Musculoskeletal: Negative.    Skin: Negative.    Allergic/Immunologic: Negative.    Neurological: Negative.    Hematological: Negative.    Psychiatric/Behavioral: Negative.     All other systems reviewed and are negative.         Objective:          3/20/2024    12:11 PM 3/27/2024     8:09 AM 4/11/2024     4:28 PM 4/12/2024     9:57 AM 4/22/2024     9:03 AM 5/2/2024     1:59 PM   Vitals - 1 value per visit   SYSTOLIC 150  136 128  150   DIASTOLIC 86  72 74  92   Pulse 61  78   71   Temp 97.4 °F (36.3 °C)     98.2 °F (36.8  "°C)   Resp 18   17  18   SPO2 97 %  96 %   97 %   Weight (lb) 191.8 191.8 189.6 192.9 192 193.01   Weight (kg) 87 87 86 87.5 87.091 87.55   Height 5' 3" (1.6 m) 5' 3" (1.6 m) 5' 3" (1.6 m) 5' 3" (1.6 m) 5' 3" (1.6 m) 5' 3" (1.6 m)   BMI (Calculated) 34 34 33.6 34.2 34 34.2   Pain Score Zero Seven Zero Zero Three Zero       Physical Exam  Vitals and nursing note reviewed.   Constitutional:       Appearance: Normal appearance. She is well-developed.   HENT:      Head: Normocephalic and atraumatic.      Right Ear: Tympanic membrane normal.      Left Ear: Tympanic membrane normal.      Nose: Nose normal.      Mouth/Throat:      Mouth: Mucous membranes are moist.   Eyes:      Conjunctiva/sclera: Conjunctivae normal.      Pupils: Pupils are equal, round, and reactive to light.   Neck:      Vascular: No carotid bruit.   Cardiovascular:      Rate and Rhythm: Normal rate and regular rhythm.      Pulses: Normal pulses.      Heart sounds: Normal heart sounds. No murmur heard.     No gallop.   Pulmonary:      Effort: Pulmonary effort is normal.      Breath sounds: Normal breath sounds. No wheezing.   Abdominal:      General: Bowel sounds are normal.      Palpations: Abdomen is soft.      Tenderness: There is no abdominal tenderness.   Musculoskeletal:         General: Normal range of motion.      Cervical back: Normal range of motion.      Right lower leg: No edema.      Left lower leg: No edema.   Lymphadenopathy:      Cervical: No cervical adenopathy.   Skin:     General: Skin is warm and dry.   Neurological:      General: No focal deficit present.      Mental Status: She is alert and oriented to person, place, and time.   Psychiatric:         Behavior: Behavior normal.         Thought Content: Thought content normal.         Judgment: Judgment normal.         Assessment:       1. Hypertension, essential    2. Cough, unspecified type    3. Pain of right mastoid    4. Gastroesophageal reflux disease, unspecified whether " esophagitis present    5. BMI 34.0-34.9,adult         Plan:       Hypertension, essential    Cough, unspecified type  -     X-Ray Chest PA And Lateral; Future; Expected date: 05/02/2024    Pain of right mastoid    Gastroesophageal reflux disease, unspecified whether esophagitis present    BMI 34.0-34.9,adult    Other orders  -     pantoprazole (PROTONIX) 40 MG tablet; Take 1 tablet (40 mg total) by mouth 2 (two) times daily.  Dispense: 60 tablet; Refill: 2  -     cefUROXime (CEFTIN) 250 MG tablet; Take 1 tablet (250 mg total) by mouth 2 (two) times daily.  Dispense: 20 tablet; Refill: 0  -     losartan (COZAAR) 25 MG tablet; Take 1 tablet (25 mg total) by mouth once daily.  Dispense: 30 tablet; Refill: 2      No follow-ups on file.      Chest x-ray.  Protonix 40 mg b.i.d. for reflux see if this helps her respiratory issues.  Ceftin 250 b.i.d. for 10 days.  Resume Cozaar 25 mg daily follow-up regarding blood pressure in 3 weeks.

## 2024-05-18 DIAGNOSIS — I10 HYPERTENSION, ESSENTIAL: ICD-10-CM

## 2024-05-20 RX ORDER — CARVEDILOL 6.25 MG/1
6.25 TABLET ORAL 2 TIMES DAILY
Qty: 60 TABLET | Refills: 1 | Status: SHIPPED | OUTPATIENT
Start: 2024-05-20

## 2024-06-03 ENCOUNTER — HOSPITAL ENCOUNTER (OUTPATIENT)
Dept: RADIOLOGY | Facility: HOSPITAL | Age: 64
Discharge: HOME | End: 2024-06-03
Attending: FAMILY MEDICINE
Payer: MEDICARE

## 2024-06-03 DIAGNOSIS — Z12.31 BREAST CANCER SCREENING BY MAMMOGRAM: ICD-10-CM

## 2024-06-03 DIAGNOSIS — R05.9 COUGH, UNSPECIFIED TYPE: ICD-10-CM

## 2024-06-03 PROCEDURE — 77067 SCR MAMMO BI INCL CAD: CPT | Mod: 26,,, | Performed by: RADIOLOGY

## 2024-06-03 PROCEDURE — 71046 X-RAY EXAM CHEST 2 VIEWS: CPT | Mod: TC,PO

## 2024-06-03 PROCEDURE — 77063 BREAST TOMOSYNTHESIS BI: CPT | Mod: TC,PO

## 2024-06-03 PROCEDURE — 71046 X-RAY EXAM CHEST 2 VIEWS: CPT | Mod: 26,,, | Performed by: RADIOLOGY

## 2024-06-03 PROCEDURE — 77067 SCR MAMMO BI INCL CAD: CPT | Mod: TC,PO

## 2024-06-03 PROCEDURE — 77063 BREAST TOMOSYNTHESIS BI: CPT | Mod: 26,,, | Performed by: RADIOLOGY

## 2024-06-11 RX ORDER — ESCITALOPRAM OXALATE 10 MG/1
10 TABLET ORAL DAILY
Qty: 90 TABLET | Refills: 3 | Status: SHIPPED | OUTPATIENT
Start: 2024-06-11

## 2024-06-12 NOTE — TELEPHONE ENCOUNTER
Refill Routing Note   Medication(s) are not appropriate for processing by [facility] for the following reason(s):        No active prescription written by provider    [facility] action(s):  Defer               Appointments  past 12m or future 3m with PCP    Date Provider   Last Visit   5/2/2024 Tanvir Cisneros III, MD   Next Visit   6/26/2024 Tanvir Cisneros III, MD   ED visits in past 90 days: 2        Note composed:11:07 PM 06/11/2024

## 2024-06-12 NOTE — TELEPHONE ENCOUNTER
No care due was identified.  [facility] Embedded Care Due Messages. Reference number: [id number].   6/11/2024 11:04:13 PM CDT

## 2024-06-27 ENCOUNTER — PATIENT MESSAGE (OUTPATIENT)
Dept: ADMINISTRATIVE | Facility: HOSPITAL | Age: 64
End: 2024-06-27
Payer: MEDICARE

## 2024-06-27 ENCOUNTER — PATIENT OUTREACH (OUTPATIENT)
Dept: ADMINISTRATIVE | Facility: HOSPITAL | Age: 64
End: 2024-06-27
Payer: MEDICARE

## 2024-06-27 NOTE — PROGRESS NOTES
Uncontrolled BP REPORT  BP Readings from Last 3 Encounters:   05/02/24 (!) 150/92   04/12/24 128/74   04/11/24 (P) 136/72       Non-compliant report chart audits for HYPERTENSION MANAGEMENT   NEED REMOTE HOME BP READING DOCUMENTED   OR  BP FOLLOW UP WITH NURSE VISIT OR CARE TEAM MEMBER

## 2024-07-16 DIAGNOSIS — I10 HYPERTENSION, ESSENTIAL: ICD-10-CM

## 2024-07-17 RX ORDER — AMLODIPINE BESYLATE 2.5 MG/1
TABLET ORAL
Qty: 30 TABLET | Refills: 2 | Status: SHIPPED | OUTPATIENT
Start: 2024-07-17

## 2024-07-17 RX ORDER — CARVEDILOL 6.25 MG/1
6.25 TABLET ORAL 2 TIMES DAILY
Qty: 60 TABLET | Refills: 1 | Status: SHIPPED | OUTPATIENT
Start: 2024-07-17

## 2024-07-17 NOTE — TELEPHONE ENCOUNTER
No care due was identified.  [facility] Embedded Care Due Messages. Reference number: [id number].   7/16/2024 11:15:27 PM CDT

## 2024-07-17 NOTE — TELEPHONE ENCOUNTER
Refill Routing Note   Medication(s) are not appropriate for processing by [facility] for the following reason(s):        Required vitals abnormal    [facility] action(s):  Defer             Appointments  past 12m or future 3m with PCP    Date Provider   Last Visit   5/2/2024 Tanvir Cisneros III, MD   Next Visit   Visit date not found Tanvir Cisneros III, MD   ED visits in past 90 days: 0        Note composed:11:35 PM 07/16/2024

## 2024-07-18 DIAGNOSIS — I10 HYPERTENSION, ESSENTIAL: ICD-10-CM

## 2024-07-18 RX ORDER — CARVEDILOL 6.25 MG/1
6.25 TABLET ORAL 2 TIMES DAILY
Qty: 180 TABLET | OUTPATIENT
Start: 2024-07-18

## 2024-07-18 NOTE — TELEPHONE ENCOUNTER
Refill Decision Note   Paula Hanna  is requesting a refill authorization.  Brief Assessment and Rationale for Refill:  Quick Discontinue     Medication Therapy Plan:         Comments:     Note composed:4:51 AM 07/18/2024             Appointments     Last Visit   5/2/2024 Tanvir Cisneros III, MD   Next Visit   Visit date not found Tanvir Cisneros III, MD

## 2024-07-18 NOTE — TELEPHONE ENCOUNTER
No care due was identified.  [facility] Embedded Care Due Messages. Reference number: [id number].   7/18/2024 12:27:45 AM CDT

## 2024-07-30 RX ORDER — LANOLIN ALCOHOL/MO/W.PET/CERES
400 CREAM (GRAM) TOPICAL
Qty: 90 TABLET | Refills: 1 | Status: SHIPPED | OUTPATIENT
Start: 2024-07-30

## 2024-07-30 NOTE — TELEPHONE ENCOUNTER
Care Due:                  Date            Visit Type   Department     Provider  --------------------------------------------------------------------------------                                EP -                              PRIMARY      [facility]  Last Visit: 05-      CARE ([facility])   [facility]  Next Visit: None Scheduled  None         None Found                                                            Last  Test          Frequency    Reason                     Performed    Due Date  --------------------------------------------------------------------------------    Mg Level....  12 months..  magnesium................  06- 06-    [facility] Embedded Care Due Messages. Reference number: [id number].   7/30/2024 3:22:38 PM CDT

## 2024-09-07 DIAGNOSIS — I10 HYPERTENSION, ESSENTIAL: ICD-10-CM

## 2024-09-07 RX ORDER — CARVEDILOL 6.25 MG/1
6.25 TABLET ORAL 2 TIMES DAILY
Qty: 180 TABLET | Refills: 1 | Status: SHIPPED | OUTPATIENT
Start: 2024-09-07

## 2024-09-07 NOTE — TELEPHONE ENCOUNTER
No care due was identified.  [facility] Embedded Care Due Messages. Reference number: [id number].   9/07/2024 6:45:36 AM CDT

## 2024-09-08 NOTE — TELEPHONE ENCOUNTER
Refill Routing Note   Medication(s) are not appropriate for processing by [facility] for the following reason(s):        Required vitals abnormal    [facility] action(s):  Defer             Appointments  past 12m or future 3m with PCP    Date Provider   Last Visit   5/2/2024 Tanvir Cisneros III, MD   Next Visit   Visit date not found Tanvir Cisneros III, MD   ED visits in past 90 days: 0        Note composed:9:38 PM 09/07/2024

## 2024-10-08 NOTE — TELEPHONE ENCOUNTER
Care Due:                  Date            Visit Type   Department     Provider  --------------------------------------------------------------------------------                                EP -                              PRIMARY      [facility]  Last Visit: 05-      CARE ([facility])   [facility]  Next Visit: None Scheduled  None         None Found                                                            Last  Test          Frequency    Reason                     Performed    Due Date  --------------------------------------------------------------------------------    Mg Level....  12 months..  magnesium................  06- 06-    [facility] Embedded Care Due Messages. Reference number: [id number].   10/08/2024 5:33:27 PM CDT

## 2024-10-09 RX ORDER — AMLODIPINE BESYLATE 2.5 MG/1
TABLET ORAL
Qty: 90 TABLET | Refills: 2 | Status: SHIPPED | OUTPATIENT
Start: 2024-10-09

## 2024-10-09 NOTE — TELEPHONE ENCOUNTER
Refill Routing Note   Medication(s) are not appropriate for processing by [facility] for the following reason(s):        Required vitals abnormal    [facility] action(s):  Defer               Appointments  past 12m or future 3m with PCP    Date Provider   Last Visit   5/2/2024 Tanvir Cisneros III, MD   Next Visit   Visit date not found Tanvir Cisneros III, MD   ED visits in past 90 days: 0        Note composed:8:06 AM 10/09/2024

## 2024-10-14 ENCOUNTER — OFFICE VISIT (OUTPATIENT)
Dept: OBSTETRICS AND GYNECOLOGY | Facility: CLINIC | Age: 64
End: 2024-10-14
Payer: MEDICARE

## 2024-10-14 VITALS
DIASTOLIC BLOOD PRESSURE: 70 MMHG | HEART RATE: 67 BPM | SYSTOLIC BLOOD PRESSURE: 132 MMHG | WEIGHT: 190.38 LBS | HEIGHT: 63 IN | RESPIRATION RATE: 18 BRPM | BODY MASS INDEX: 33.73 KG/M2

## 2024-10-14 DIAGNOSIS — N76.0 ACUTE VAGINITIS: Primary | ICD-10-CM

## 2024-10-14 PROCEDURE — 4010F ACE/ARB THERAPY RXD/TAKEN: CPT | Mod: CPTII,S$GLB,, | Performed by: STUDENT IN AN ORGANIZED HEALTH CARE EDUCATION/TRAINING PROGRAM

## 2024-10-14 PROCEDURE — 3078F DIAST BP <80 MM HG: CPT | Mod: CPTII,S$GLB,, | Performed by: STUDENT IN AN ORGANIZED HEALTH CARE EDUCATION/TRAINING PROGRAM

## 2024-10-14 PROCEDURE — 99999 PR PBB SHADOW E&M-EST. PATIENT-LVL III: CPT | Mod: PBBFAC,,, | Performed by: STUDENT IN AN ORGANIZED HEALTH CARE EDUCATION/TRAINING PROGRAM

## 2024-10-14 PROCEDURE — 3008F BODY MASS INDEX DOCD: CPT | Mod: CPTII,S$GLB,, | Performed by: STUDENT IN AN ORGANIZED HEALTH CARE EDUCATION/TRAINING PROGRAM

## 2024-10-14 PROCEDURE — 1159F MED LIST DOCD IN RCRD: CPT | Mod: CPTII,S$GLB,, | Performed by: STUDENT IN AN ORGANIZED HEALTH CARE EDUCATION/TRAINING PROGRAM

## 2024-10-14 PROCEDURE — 3075F SYST BP GE 130 - 139MM HG: CPT | Mod: CPTII,S$GLB,, | Performed by: STUDENT IN AN ORGANIZED HEALTH CARE EDUCATION/TRAINING PROGRAM

## 2024-10-14 PROCEDURE — 1160F RVW MEDS BY RX/DR IN RCRD: CPT | Mod: CPTII,S$GLB,, | Performed by: STUDENT IN AN ORGANIZED HEALTH CARE EDUCATION/TRAINING PROGRAM

## 2024-10-14 PROCEDURE — 99213 OFFICE O/P EST LOW 20 MIN: CPT | Mod: S$GLB,,, | Performed by: STUDENT IN AN ORGANIZED HEALTH CARE EDUCATION/TRAINING PROGRAM

## 2024-10-14 RX ORDER — FLUCONAZOLE 150 MG/1
150 TABLET ORAL ONCE
Qty: 2 TABLET | Refills: 0 | Status: SHIPPED | OUTPATIENT
Start: 2024-10-14 | End: 2024-10-14

## 2024-10-14 NOTE — PROGRESS NOTES
Chief Complaint   Patient presents with    Vulvar Itch     Internal vaginal itch       History of Present Illness   Paula Hanna is 64 y.o. BF  patient who presents today c/o vaginal pruritus since .  Patient denies any discharge, odor, or urinary symptoms.  She did unplanned sex with her  and started using Dove with sea salt exfoliation.  Patient takes mainly showers and denies any other changes in hygiene products.    History  PMH: L-spine hernation and radiculopathy, Anxiety, Psoriasis, HTN, GERD  Psx: Debra, Ileocecal tumor resection, RODOLFO, Tonsils, R plate wrist placement  All: Statin, ACE-I, NSAIDs, PCN, Sulfa  OB:   GYN: Denies any STIs or abnl Pap  FH: Denies any female/colon cancer or MI prior to 48yo  SH: Denies MUNA  Meds:  Current Outpatient Medications   Medication Instructions    ALPRAZolam (XANAX) 0.5 mg, Oral, Nightly PRN    amLODIPine (NORVASC) 2.5 MG tablet TAKE 1 TABLET(2.5 MG) BY MOUTH DAILY    aspirin (ECOTRIN) 81 mg, Daily    azelastine (ASTELIN) 137 mcg, Nasal, 2 times daily    carvediloL (COREG) 6.25 mg, Oral, 2 times daily    EScitalopram oxalate (LEXAPRO) 10 mg, Oral, Daily    estradioL (VIVELLE-DOT) 0.1 mg/24 hr PTSW 1 patch, Transdermal, Twice weekly    fluconazole (DIFLUCAN) 150 mg, Oral, Once, Use 2nd dose if symptoms has not resolved in 72 hours    fluticasone propionate (FLONASE) 50 mcg, Each Nostril, Daily    ibuprofen (ADVIL,MOTRIN) 800 MG tablet TAKE 1 TABLET(800 MG) BY MOUTH THREE TIMES DAILY EVERY 8 HOURS AS NEEDED FOR PAIN    levocetirizine (XYZAL) 5 mg, Oral, Nightly    magnesium oxide (MAG-OX) 400 mg, Oral    pantoprazole (PROTONIX) 40 mg, Oral, Daily    polyethylene glycol (GLYCOLAX) 17 g, Daily    spironolactone (ALDACTONE) 25 mg, Oral, Daily       Review of Systems   Constitutional:  Negative for chills and fever.   Eyes:  Negative for visual disturbance.   Respiratory:  Negative for cough and shortness of breath.    Cardiovascular:  Negative  "for chest pain and palpitations.   Gastrointestinal:  Negative for abdominal pain, nausea and vomiting.   Genitourinary:  Negative for dysuria, pelvic pain, urgency, vaginal bleeding, vaginal discharge, vaginal pain, vaginal dryness and vaginal odor.   Neurological:  Negative for headaches.   Psychiatric/Behavioral:  Negative for depression and sleep disturbance. The patient is not nervous/anxious.    All other systems reviewed and are negative.  Breast: Negative for lump and mastodynia        Physical Examination:  Vitals:    10/14/24 1529   BP: 132/70   Pulse: 67   Resp: 18   Weight: 86.4 kg (190 lb 5.9 oz)   Height: 5' 3" (1.6 m)        Physical Exam  Vitals reviewed.   Constitutional:       Appearance: Normal appearance.   HENT:      Head: Normocephalic and atraumatic.   Eyes:      Conjunctiva/sclera: Conjunctivae normal.   Cardiovascular:      Rate and Rhythm: Normal rate and regular rhythm.   Pulmonary:      Effort: Pulmonary effort is normal.      Breath sounds: Normal breath sounds. No wheezing.   Abdominal:      General: Abdomen is flat.      Palpations: Abdomen is soft.      Tenderness: There is no abdominal tenderness.   Musculoskeletal:         General: Normal range of motion.      Cervical back: Normal range of motion.   Skin:     General: Skin is warm and dry.   Neurological:      General: No focal deficit present.      Mental Status: She is alert and oriented to person, place, and time.   Psychiatric:         Mood and Affect: Mood normal.         Behavior: Behavior normal.         Thought Content: Thought content normal.         Judgment: Judgment normal.          Assessment:    1. Acute vaginitis  fluconazole (DIFLUCAN) 150 MG Tab          Plan:  Will treat empirically with Diflucan Rx  Patient counseled to return to Dove sensitive unscented soaps  If symptoms remain, may need vaginosis swab to test for candida types  RV PRN               "

## 2024-10-21 ENCOUNTER — PATIENT MESSAGE (OUTPATIENT)
Dept: ADMINISTRATIVE | Facility: HOSPITAL | Age: 64
End: 2024-10-21
Payer: MEDICARE

## 2024-11-07 ENCOUNTER — TELEPHONE (OUTPATIENT)
Dept: ORTHOPEDICS | Facility: CLINIC | Age: 64
End: 2024-11-07
Payer: MEDICARE

## 2024-11-13 DIAGNOSIS — F41.9 ANXIETY: ICD-10-CM

## 2024-11-13 RX ORDER — IBUPROFEN 800 MG/1
TABLET ORAL
Qty: 30 TABLET | Refills: 0 | Status: SHIPPED | OUTPATIENT
Start: 2024-11-13

## 2024-11-13 RX ORDER — ALPRAZOLAM 0.5 MG/1
0.5 TABLET ORAL NIGHTLY PRN
Qty: 30 TABLET | OUTPATIENT
Start: 2024-11-13

## 2024-11-13 NOTE — TELEPHONE ENCOUNTER
No care due was identified.  [facility] Embedded Care Due Messages. Reference number: [id number].   11/13/2024 2:34:37 PM CST

## 2024-11-14 NOTE — TELEPHONE ENCOUNTER
Ret call to pt rosendo bee on vm she needs to make apt for refill xanax since it has been since may for her last visit.

## 2024-11-15 ENCOUNTER — TELEPHONE (OUTPATIENT)
Dept: FAMILY MEDICINE | Facility: CLINIC | Age: 64
End: 2024-11-15
Payer: MEDICARE

## 2024-11-15 DIAGNOSIS — F41.9 ANXIETY: ICD-10-CM

## 2024-11-15 RX ORDER — ALPRAZOLAM 0.5 MG/1
0.5 TABLET ORAL NIGHTLY PRN
Qty: 30 TABLET | Refills: 0 | Status: SHIPPED | OUTPATIENT
Start: 2024-11-15

## 2024-11-26 ENCOUNTER — PATIENT MESSAGE (OUTPATIENT)
Dept: OBSTETRICS AND GYNECOLOGY | Facility: CLINIC | Age: 64
End: 2024-11-26
Payer: MEDICARE

## 2024-12-16 ENCOUNTER — TELEPHONE (OUTPATIENT)
Dept: OBSTETRICS AND GYNECOLOGY | Facility: CLINIC | Age: 64
End: 2024-12-16
Payer: MEDICARE

## 2024-12-16 NOTE — TELEPHONE ENCOUNTER
Contacted pt and advised her that we do not have anything available sooner than her scheduled appt 12/18/24. Pt voiced understanding.

## 2024-12-16 NOTE — TELEPHONE ENCOUNTER
----- Message from Danisha sent at 12/16/2024  2:49 PM CST -----  Regarding: appointment  Contact: patient  Type:  Same Day Appointment Request    Caller is requesting a same day appointment.  Caller declined first available appointment listed below.      Name of Caller:  patient  When is the first available appointment?  12/18/24  Symptoms:  spotting blood  Best Call Back Number:  [phone] (home)     Additional Information:   Please call patient to advise.  Thanks!

## 2024-12-18 ENCOUNTER — OFFICE VISIT (OUTPATIENT)
Dept: OBSTETRICS AND GYNECOLOGY | Facility: CLINIC | Age: 64
End: 2024-12-18
Payer: MEDICARE

## 2024-12-18 VITALS
BODY MASS INDEX: 32.93 KG/M2 | WEIGHT: 185.88 LBS | HEIGHT: 63 IN | SYSTOLIC BLOOD PRESSURE: 140 MMHG | DIASTOLIC BLOOD PRESSURE: 78 MMHG

## 2024-12-18 DIAGNOSIS — N95.2 ATROPHIC VAGINITIS: ICD-10-CM

## 2024-12-18 DIAGNOSIS — N89.8 VAGINAL DISCHARGE: ICD-10-CM

## 2024-12-18 DIAGNOSIS — N77.1 VAGINITIS, VULVITIS AND VULVOVAGINITIS IN DISEASES CLASSIFIED ELSEWHERE: Primary | ICD-10-CM

## 2024-12-18 DIAGNOSIS — R32 URINARY INCONTINENCE IN FEMALE: ICD-10-CM

## 2024-12-18 PROCEDURE — 0352U VAGINOSIS SCREEN BY DNA PROBE: CPT | Performed by: OBSTETRICS & GYNECOLOGY

## 2024-12-18 PROCEDURE — 87086 URINE CULTURE/COLONY COUNT: CPT | Performed by: OBSTETRICS & GYNECOLOGY

## 2024-12-18 PROCEDURE — 99999 PR PBB SHADOW E&M-EST. PATIENT-LVL III: CPT | Mod: PBBFAC,,, | Performed by: OBSTETRICS & GYNECOLOGY

## 2024-12-18 PROCEDURE — 87088 URINE BACTERIA CULTURE: CPT | Performed by: OBSTETRICS & GYNECOLOGY

## 2024-12-18 PROCEDURE — 87077 CULTURE AEROBIC IDENTIFY: CPT | Performed by: OBSTETRICS & GYNECOLOGY

## 2024-12-18 PROCEDURE — 87186 SC STD MICRODIL/AGAR DIL: CPT | Performed by: OBSTETRICS & GYNECOLOGY

## 2024-12-18 RX ORDER — CLOTRIMAZOLE AND BETAMETHASONE DIPROPIONATE 10; .64 MG/G; MG/G
CREAM TOPICAL 2 TIMES DAILY
Qty: 45 G | Refills: 1 | Status: SHIPPED | OUTPATIENT
Start: 2024-12-18

## 2024-12-18 RX ORDER — ESTRADIOL 10 UG/1
10 INSERT VAGINAL
Qty: 24 TABLET | Refills: 3 | Status: SHIPPED | OUTPATIENT
Start: 2024-12-19 | End: 2024-12-23 | Stop reason: ALTCHOICE

## 2024-12-18 NOTE — PROGRESS NOTES
[facility] Note    Pertinent Med & GYN Problem List    Hx RODOLFO ? BSO  Ileostomy    Subjective:   Chief Complaint:  Vaginal Discharge    Date: 2024     Patient ID: Paula Hanna is a  64 y.o. female.    HRT:  Transdermal estrogen    No LMP recorded. Patient has had a hysterectomy.    The patient presents today due to the following:    The patient reports issues with internal and external vaginal discharge and irritation.  In addition she recently noted some postcoital bleeding.  The itching is primarily external.  In addition she reports some issues with urinary incontinence associated with coughing.      GYN & OB History  LMP: No LMP recorded. Patient has had a hysterectomy.     Age of Menarche:12  Age at first pregnancy:    Age at first live birth:18  Number of months breastfeeding:    Age at Menopause:54   Comments:     OB History          3    Para   3    Term   3            AB   0    Living   1         SAB   0    IAB        Ectopic        Multiple        Live Births               Obstetric Comments   Vaginal delivery x3 with one stillborn and the recent passing of one of her children               Allergies:   Review of patient's allergies indicates:   Allergen Reactions    Statins-hmg-coa reductase inhibitors Other (See Comments)     Muscle and joint pain      Lisinopril Other (See Comments)     COUGH, PATIENT ADVICE [facility] 24    Milk containing products (dairy)     Naproxen Itching    Penicillins Itching     Pt states can take Keflex    Sulfa (sulfonamide antibiotics) Itching       Past Medical History:   Diagnosis Date    Anxiety     Back pain     Degenerative disc disease     GERD (gastroesophageal reflux disease)     Heel spur     Hypertension     Hypertrophy of both inferior nasal turbinates     Nasal septal deviation     Psoriasis     Sciatica        Past Surgical History:   Procedure Laterality Date    COLON SURGERY      Cecal tumor/right side     HYSTERECTOMY  1999    RODOLFO with ? BSO    Ileocolectomy  2000        KNEE ARTHROSCOPY W/ DEBRIDEMENT      x 2, right    LAPAROSCOPIC CHOLECYSTECTOMY N/A 05/16/2021    Procedure: CHOLECYSTECTOMY, LAPAROSCOPIC;  Surgeon: Pepe Cool MD;  Location: [facility];  Service: General;  Laterality: N/A;    LUMBAR EPIDURAL INJECTION      TONSILLECTOMY      WRIST SURGERY Right     plate       Medications    Current Outpatient Medications:     ALPRAZolam (XANAX) 0.5 MG tablet, Take 1 tablet (0.5 mg total) by mouth nightly as needed for Anxiety., Disp: 30 tablet, Rfl: 0    amLODIPine (NORVASC) 2.5 MG tablet, TAKE 1 TABLET(2.5 MG) BY MOUTH DAILY, Disp: 90 tablet, Rfl: 2    aspirin (ECOTRIN) 81 MG EC tablet, Take 81 mg by mouth once daily., Disp: , Rfl:     azelastine (ASTELIN) 137 mcg (0.1 %) nasal spray, 1 spray (137 mcg total) by Nasal route 2 (two) times daily., Disp: 90 mL, Rfl: 1    carvediloL (COREG) 6.25 MG tablet, TAKE 1 TABLET(6.25 MG) BY MOUTH TWICE DAILY WITH MEALS, Disp: 180 tablet, Rfl: 1    estradioL (VIVELLE-DOT) 0.1 mg/24 hr PTSW, Place 1 patch onto the skin twice a week., Disp: 24 patch, Rfl: 3    fluticasone propionate (FLONASE) 50 mcg/actuation nasal spray, 1 spray (50 mcg total) by Each Nostril route once daily., Disp: 16 g, Rfl: 5    ibuprofen (ADVIL,MOTRIN) 800 MG tablet, TAKE 1 TABLET(800 MG) BY MOUTH THREE TIMES DAILY EVERY 8 HOURS AS NEEDED FOR PAIN, Disp: 30 tablet, Rfl: 0    levocetirizine (XYZAL) 5 MG tablet, Take 1 tablet (5 mg total) by mouth every evening., Disp: 90 tablet, Rfl: 1    magnesium oxide (MAG-OX) 400 mg (241.3 mg magnesium) tablet, TAKE 1 TABLET(400 MG) BY MOUTH EVERY DAY, Disp: 90 tablet, Rfl: 1    pantoprazole (PROTONIX) 40 MG tablet, Take 1 tablet (40 mg total) by mouth once daily., Disp: 90 tablet, Rfl: 1    polyethylene glycol (GLYCOLAX) 17 gram/dose powder, Take 17 g by mouth once daily. , Disp: , Rfl: 0    spironolactone (ALDACTONE) 25 MG tablet, Take 1 tablet (25 mg  total) by mouth once daily., Disp: 90 tablet, Rfl: 1    clotrimazole-betamethasone 1-0.05% (LOTRISONE) cream, Apply topically 2 (two) times daily. PRN itch, Disp: 45 g, Rfl: 1    EScitalopram oxalate (LEXAPRO) 10 MG tablet, Take 1 tablet (10 mg total) by mouth once daily. (Patient not taking: Reported on 2024), Disp: 90 tablet, Rfl: 3    estradioL (ESTRACE) 0.01 % (0.1 mg/gram) vaginal cream, Apply small pea-sized amount in vagina every night for 14 days, then every other night afterwards, Disp: 42.5 g, Rfl: 1    fluconazole (DIFLUCAN) 150 MG Tab, Take 1 tablet (150 mg total) by mouth every 72 hours. Take 1 tablets by mouth now and in 72 hours if symptoms still persist, Disp: 2 tablet, Rfl: 0     Social History     Tobacco Use    Smoking status: Former     Current packs/day: 0.00     Average packs/day: 0.3 packs/day for 22.0 years (5.5 ttl pk-yrs)     Types: Cigarettes     Start date: 3/1/1995     Quit date: 3/1/2017     Years since quittin.8    Smokeless tobacco: Never    Tobacco comments:     less than 1 pack week   Substance Use Topics    Alcohol use: Not Currently     Comment: socially    Drug use: No       Family History   Problem Relation Name Age of Onset    Hypertension Mother Inge     Heart disease Mother Inge     Hypertension Father Wilcous     Cancer Father Wilcous     Diabetes Sister Hedy     Hypertension Sister Hedy     Diabetes Brother Annie Jr.     Hypertension Brother Annie Jr.     Prostate cancer Brother Annie Jr.     Migraines Daughter      Diabetes Son      Ovarian cancer Neg Hx      Breast cancer Neg Hx         Review of Systems (at today's evaluation)  Review of Systems   Constitutional:  Negative for fever and unexpected weight change.   Respiratory:  Negative for cough and shortness of breath.    Cardiovascular:  Negative for chest pain and palpitations.   Gastrointestinal:  Negative for constipation, diarrhea, nausea and vomiting.   Genitourinary:  Positive for bladder  incontinence. Negative for dysuria, frequency, hematuria and urgency.        Gyn as per HPI   Neurological:  Negative for headaches.        Objective:      Vitals:    12/18/24 1002   BP: (!) 140/78     Physical Exam:   Constitutional: She appears well-developed and well-nourished.    HENT:   Head: Normocephalic.     Neck: No thyroid mass present.    Cardiovascular:  Normal rate, regular rhythm and normal heart sounds.             Pulmonary/Chest: Effort normal and breath sounds normal.        Abdominal: Soft. There is no abdominal tenderness.     Genitourinary:    Inguinal canal, right adnexa and left adnexa normal.            Pelvic exam was performed with patient supine.   The external female genitalia was abnormal.   Right adnexum displays no tenderness and no fullness. Left adnexum displays no tenderness and no fullness. There is erythema and vaginal discharge in the vagina. No tenderness or bleeding in the vagina. Vaginal atrophy noted. Cervix is absent.Uterus is absent. Normal urethral meatus.Urethra findings: no tendernessBladder findings: no bladder tenderness   Genitourinary Comments: A chaperone (female medical assistant) was present throughout the physical exam.             Musculoskeletal:      Right lower leg: No edema.      Left lower leg: No edema.      Lymphadenopathy:     She has no cervical adenopathy. No inguinal adenopathy noted on the right or left side.    Neurological: She is alert.    Skin: Skin is warm and dry.    Psychiatric: Mood normal.       Office urinalysis on 12/18/2024    Negative:  Glucose / Bilirubin / Ketones / Blood / Protein / Nitrites / Leukocyte esterase     Assessment:        1. Vaginitis, vulvitis and vulvovaginitis in diseases classified elsewhere    2. Vaginal discharge    3. Urinary incontinence in female    4. Atrophic vaginitis        Plan:      Vaginitis, vulvitis and vulvovaginitis in diseases classified elsewhere  -     Discontinue: estradioL (VAGIFEM) 10 mcg Tab;  Place 1 tablet (10 mcg total) vaginally twice a week.  Dispense: 24 tablet; Refill: 3  -     clotrimazole-betamethasone 1-0.05% (LOTRISONE) cream; Apply topically 2 (two) times daily. PRN itch  Dispense: 45 g; Refill: 1  -     Vaginosis Screen by DNA Probe    Vaginal discharge  -     Vaginosis Screen by DNA Probe    Urinary incontinence in female  -     POCT Urinalysis(Instrument)  -     Urine culture    Atrophic vaginitis  -     Discontinue: estradioL (VAGIFEM) 10 mcg Tab; Place 1 tablet (10 mcg total) vaginally twice a week.  Dispense: 24 tablet; Refill: 3       Follow up in about 4 weeks (around 1/15/2025) for F/U on today's evaluation, as needed / for any GYN related issues.     The above was reviewed discussed with the patient.  We discussed the patient's issues and findings on physical exam.    In regards to her vaginal issues despite transdermal treatment atrophic changes are still present.  Options reviewed and the patient be started on a topical vaginal estrogen.    Vaginitis panel was obtained and the patient is being treated with a topical steroid antifungal for external use pending results of the vaginitis panel.    Negative findings on the patient's office urinalysis were discussed.  The option of referral to Urogynecology for potential medical or surgical intervention was discussed.  This was declined at this time by the patient as she is comfortable with conservative management for her incontinence at this time.  We will base further evaluation treatment results of the above testing and the patient's response to medical intervention    The patient's questions were answered, and she is in agreement with the current plan.     Anshu Guaman MD  Department OBGYN  [facility]

## 2024-12-20 ENCOUNTER — TELEPHONE (OUTPATIENT)
Dept: FAMILY MEDICINE | Facility: CLINIC | Age: 64
End: 2024-12-20
Payer: MEDICARE

## 2024-12-21 LAB
BACTERIA UR CULT: ABNORMAL
BACTERIAL VAGINOSIS DNA: NOT DETECTED
CANDIDA GLABRATA/KRUSEI: NOT DETECTED
CANDIDA RRNA VAG QL PROBE: DETECTED
TRICHOMONAS VAGINALIS: NOT DETECTED

## 2024-12-23 ENCOUNTER — PATIENT MESSAGE (OUTPATIENT)
Dept: OBSTETRICS AND GYNECOLOGY | Facility: CLINIC | Age: 64
End: 2024-12-23
Payer: MEDICARE

## 2024-12-23 DIAGNOSIS — N95.2 VAGINAL ATROPHY: ICD-10-CM

## 2024-12-23 DIAGNOSIS — B37.9 YEAST INFECTION: Primary | ICD-10-CM

## 2024-12-23 RX ORDER — FLUCONAZOLE 150 MG/1
150 TABLET ORAL
Qty: 2 TABLET | Refills: 0 | Status: SHIPPED | OUTPATIENT
Start: 2024-12-23

## 2024-12-23 RX ORDER — ESTRADIOL 0.1 MG/G
CREAM VAGINAL
Qty: 42.5 G | Refills: 1 | Status: SHIPPED | OUTPATIENT
Start: 2024-12-23

## 2024-12-23 NOTE — TELEPHONE ENCOUNTER
Pt states she went to  Vagifem, but was told she had to pay $200. Pt wanted to know if there is another medication she would be able to take.     Pt culture for yeast came back positive. Please advise for Diflucan prescription.

## 2025-01-03 ENCOUNTER — PATIENT MESSAGE (OUTPATIENT)
Dept: OBSTETRICS AND GYNECOLOGY | Facility: CLINIC | Age: 65
End: 2025-01-03
Payer: MEDICARE

## 2025-01-12 ENCOUNTER — E-VISIT (OUTPATIENT)
Facility: CLINIC | Age: 65
End: 2025-01-12
Payer: MEDICARE

## 2025-01-12 ENCOUNTER — NURSE TRIAGE (OUTPATIENT)
Dept: ADMINISTRATIVE | Facility: CLINIC | Age: 65
End: 2025-01-12
Payer: MEDICARE

## 2025-01-12 DIAGNOSIS — R35.0 URINARY FREQUENCY: Primary | ICD-10-CM

## 2025-01-12 PROCEDURE — 99422 OL DIG E/M SVC 11-20 MIN: CPT | Mod: ,,, | Performed by: EMERGENCY MEDICINE

## 2025-01-12 RX ORDER — NITROFURANTOIN 25; 75 MG/1; MG/1
100 CAPSULE ORAL 2 TIMES DAILY
Qty: 10 CAPSULE | Refills: 0 | Status: SHIPPED | OUTPATIENT
Start: 2025-01-12 | End: 2025-01-17

## 2025-01-12 NOTE — TELEPHONE ENCOUNTER
Pt c/o urinary frequency on last night and urinary pressure. Denies fever at this time. Advise to be seen within 24 hours. Unable to schedule appt with PCP office. No flank or back pain at this time. Secure chat sent to Sathya Dr. Benedicto Redman MD per protocol. Pt clarifies that she also has white vaginal discharge without an odor. Provider advised E-visit and to f/u with PCP within a week. Pt made aware. VU. Pt has appt with PCP on 1/16/25.Pt completed E-Visit with Sathya for UTI symptoms. Encounter routed to provider.     Reason for Disposition   Urinating more frequently than usual (i.e., frequency) OR new-onset of the feeling of an urgent need to urinate (i.e., urgency)    Additional Information   Negative: Shock suspected (e.g., cold/pale/clammy skin, too weak to stand, low BP, rapid pulse)   Negative: Sounds like a life-threatening emergency to the triager   Negative: [1] Unable to urinate (or only a few drops) > 4 hours AND [2] bladder feels very full (e.g., palpable bladder or strong urge to urinate)   Negative: [1] Decreased urination and [2] drinking very little AND [3] dehydration suspected (e.g., dark urine, no urine > 12 hours, very dry mouth, very lightheaded)   Negative: Patient sounds very sick or weak to the triager   Negative: Fever > 100.4 F (38.0 C)   Negative: Side (flank) or lower back pain present    Protocols used: Urinary Symptoms-A-AH

## 2025-01-12 NOTE — PROGRESS NOTES
[facility] Emergency Department Plan of Care Note    Referral source: Nurse On-Call      Reason for consult:  urinary frequency and urinary pressure      Additional History: No fevers, no flank or back pain. No abdo pain.   She reports it feels like a UTI, although she has also been followed for vaginitis, does has some similar sensations.     Chart review, she was recently treated with anti-fungals and is on estrogen for vaginitis.    She has a UA from early December which did not show signs of infection.       Disposition recommended: Treat in place      Additional Recommendations: Difficult to tell UTI vs. Vaginitis symptoms. Is already being treated for vaginitis and is s/p antifungals.   She did have a + U culture from early 12/2024 which grew enterococcus fecalis sensitive to macrobid.     Will Rx course of macrobid in case this is UTI and will advise follow up with PCP this coming week for continued eval.     Time spent: 15 minutes.

## 2025-01-14 ENCOUNTER — OCHSNER VIRTUAL EMERGENCY DEPARTMENT (OUTPATIENT)
Facility: CLINIC | Age: 65
End: 2025-01-14
Payer: MEDICARE

## 2025-01-16 ENCOUNTER — OFFICE VISIT (OUTPATIENT)
Dept: FAMILY MEDICINE | Facility: CLINIC | Age: 65
End: 2025-01-16
Payer: MEDICARE

## 2025-01-16 ENCOUNTER — HOSPITAL ENCOUNTER (OUTPATIENT)
Dept: RADIOLOGY | Facility: HOSPITAL | Age: 65
Discharge: HOME | End: 2025-01-16
Attending: FAMILY MEDICINE
Payer: MEDICARE

## 2025-01-16 ENCOUNTER — LAB VISIT (OUTPATIENT)
Dept: LAB | Facility: HOSPITAL | Age: 65
End: 2025-01-16
Attending: FAMILY MEDICINE
Payer: MEDICARE

## 2025-01-16 VITALS
DIASTOLIC BLOOD PRESSURE: 84 MMHG | WEIGHT: 186.5 LBS | HEIGHT: 63 IN | SYSTOLIC BLOOD PRESSURE: 132 MMHG | TEMPERATURE: 97 F | OXYGEN SATURATION: 98 % | HEART RATE: 75 BPM | BODY MASS INDEX: 33.04 KG/M2

## 2025-01-16 DIAGNOSIS — Z79.82 ASPIRIN LONG-TERM USE: ICD-10-CM

## 2025-01-16 DIAGNOSIS — R79.9 ABNORMAL FINDING OF BLOOD CHEMISTRY, UNSPECIFIED: ICD-10-CM

## 2025-01-16 DIAGNOSIS — Z78.0 MENOPAUSE: ICD-10-CM

## 2025-01-16 DIAGNOSIS — L40.50 PSORIATIC ARTHRITIS: ICD-10-CM

## 2025-01-16 DIAGNOSIS — R20.2 PARESTHESIA OF LEFT FOOT: ICD-10-CM

## 2025-01-16 DIAGNOSIS — F32.A DEPRESSION, UNSPECIFIED DEPRESSION TYPE: ICD-10-CM

## 2025-01-16 DIAGNOSIS — E53.8 VITAMIN B12 DEFICIENCY: ICD-10-CM

## 2025-01-16 DIAGNOSIS — F41.9 ANXIETY: ICD-10-CM

## 2025-01-16 DIAGNOSIS — I10 HYPERTENSION, ESSENTIAL: Primary | ICD-10-CM

## 2025-01-16 DIAGNOSIS — Z23 NEED FOR INFLUENZA VACCINATION: ICD-10-CM

## 2025-01-16 DIAGNOSIS — I10 HYPERTENSION, ESSENTIAL: ICD-10-CM

## 2025-01-16 DIAGNOSIS — N39.0 URINARY TRACT INFECTION WITHOUT HEMATURIA, SITE UNSPECIFIED: ICD-10-CM

## 2025-01-16 DIAGNOSIS — K63.5 POLYP OF COLON, UNSPECIFIED PART OF COLON, UNSPECIFIED TYPE: ICD-10-CM

## 2025-01-16 DIAGNOSIS — M51.9 LUMBAR DISC DISEASE: ICD-10-CM

## 2025-01-16 LAB
ALBUMIN SERPL BCP-MCNC: 4.1 G/DL (ref 3.5–5.2)
ALP SERPL-CCNC: 87 U/L (ref 40–150)
ALT SERPL W/O P-5'-P-CCNC: 18 U/L (ref 10–44)
ANION GAP SERPL CALC-SCNC: 11 MMOL/L (ref 8–16)
AST SERPL-CCNC: 15 U/L (ref 10–40)
BASOPHILS # BLD AUTO: 0.03 K/UL (ref 0–0.2)
BASOPHILS NFR BLD: 0.6 % (ref 0–1.9)
BILIRUB SERPL-MCNC: 0.5 MG/DL (ref 0.1–1)
BUN SERPL-MCNC: 14 MG/DL (ref 8–23)
CALCIUM SERPL-MCNC: 9.3 MG/DL (ref 8.7–10.5)
CHLORIDE SERPL-SCNC: 105 MMOL/L (ref 95–110)
CHOLEST SERPL-MCNC: 185 MG/DL (ref 120–199)
CHOLEST/HDLC SERPL: 3.3 {RATIO} (ref 2–5)
CO2 SERPL-SCNC: 24 MMOL/L (ref 23–29)
CREAT SERPL-MCNC: 0.8 MG/DL (ref 0.5–1.4)
CRP SERPL-MCNC: 3.7 MG/L (ref 0–8.2)
DIFFERENTIAL METHOD BLD: ABNORMAL
EOSINOPHIL # BLD AUTO: 0.1 K/UL (ref 0–0.5)
EOSINOPHIL NFR BLD: 1.9 % (ref 0–8)
ERYTHROCYTE [DISTWIDTH] IN BLOOD BY AUTOMATED COUNT: 14.6 % (ref 11.5–14.5)
ERYTHROCYTE [SEDIMENTATION RATE] IN BLOOD BY WESTERGREN METHOD: 7 MM/HR (ref 0–20)
EST. GFR  (NO RACE VARIABLE): >60 ML/MIN/1.73 M^2
ESTIMATED AVG GLUCOSE: 120 MG/DL (ref 68–131)
GLUCOSE SERPL-MCNC: 96 MG/DL (ref 70–110)
HBA1C MFR BLD: 5.8 % (ref 4–5.6)
HCT VFR BLD AUTO: 40.4 % (ref 37–48.5)
HDLC SERPL-MCNC: 56 MG/DL (ref 40–75)
HDLC SERPL: 30.3 % (ref 20–50)
HGB BLD-MCNC: 13.2 G/DL (ref 12–16)
IMM GRANULOCYTES # BLD AUTO: 0.01 K/UL (ref 0–0.04)
IMM GRANULOCYTES NFR BLD AUTO: 0.2 % (ref 0–0.5)
LDLC SERPL CALC-MCNC: 111 MG/DL (ref 63–159)
LYMPHOCYTES # BLD AUTO: 2.3 K/UL (ref 1–4.8)
LYMPHOCYTES NFR BLD: 43.4 % (ref 18–48)
MCH RBC QN AUTO: 29.1 PG (ref 27–31)
MCHC RBC AUTO-ENTMCNC: 32.7 G/DL (ref 32–36)
MCV RBC AUTO: 89 FL (ref 82–98)
MONOCYTES # BLD AUTO: 0.4 K/UL (ref 0.3–1)
MONOCYTES NFR BLD: 8.3 % (ref 4–15)
NEUTROPHILS # BLD AUTO: 2.4 K/UL (ref 1.8–7.7)
NEUTROPHILS NFR BLD: 45.6 % (ref 38–73)
NONHDLC SERPL-MCNC: 129 MG/DL
NRBC BLD-RTO: 0 /100 WBC
PLATELET # BLD AUTO: 240 K/UL (ref 150–450)
PMV BLD AUTO: 11.5 FL (ref 9.2–12.9)
POTASSIUM SERPL-SCNC: 4.1 MMOL/L (ref 3.5–5.1)
PROT SERPL-MCNC: 7.7 G/DL (ref 6–8.4)
RBC # BLD AUTO: 4.53 M/UL (ref 4–5.4)
SODIUM SERPL-SCNC: 140 MMOL/L (ref 136–145)
TRIGL SERPL-MCNC: 90 MG/DL (ref 30–150)
TSH SERPL DL<=0.005 MIU/L-ACNC: 0.72 UIU/ML (ref 0.4–4)
VIT B12 SERPL-MCNC: 723 PG/ML (ref 210–950)
WBC # BLD AUTO: 5.28 K/UL (ref 3.9–12.7)

## 2025-01-16 PROCEDURE — 86038 ANTINUCLEAR ANTIBODIES: CPT | Performed by: FAMILY MEDICINE

## 2025-01-16 PROCEDURE — 3008F BODY MASS INDEX DOCD: CPT | Mod: CPTII,S$GLB,, | Performed by: FAMILY MEDICINE

## 2025-01-16 PROCEDURE — 1159F MED LIST DOCD IN RCRD: CPT | Mod: CPTII,S$GLB,, | Performed by: FAMILY MEDICINE

## 2025-01-16 PROCEDURE — 3044F HG A1C LEVEL LT 7.0%: CPT | Mod: CPTII,S$GLB,, | Performed by: FAMILY MEDICINE

## 2025-01-16 PROCEDURE — 83036 HEMOGLOBIN GLYCOSYLATED A1C: CPT | Performed by: FAMILY MEDICINE

## 2025-01-16 PROCEDURE — 1160F RVW MEDS BY RX/DR IN RCRD: CPT | Mod: CPTII,S$GLB,, | Performed by: FAMILY MEDICINE

## 2025-01-16 PROCEDURE — 73130 X-RAY EXAM OF HAND: CPT | Mod: TC,50,PO

## 2025-01-16 PROCEDURE — 84443 ASSAY THYROID STIM HORMONE: CPT | Performed by: FAMILY MEDICINE

## 2025-01-16 PROCEDURE — 82607 VITAMIN B-12: CPT | Performed by: FAMILY MEDICINE

## 2025-01-16 PROCEDURE — 99214 OFFICE O/P EST MOD 30 MIN: CPT | Mod: S$GLB,,, | Performed by: FAMILY MEDICINE

## 2025-01-16 PROCEDURE — 85025 COMPLETE CBC W/AUTO DIFF WBC: CPT | Performed by: FAMILY MEDICINE

## 2025-01-16 PROCEDURE — G2211 COMPLEX E/M VISIT ADD ON: HCPCS | Mod: S$GLB,,, | Performed by: FAMILY MEDICINE

## 2025-01-16 PROCEDURE — 3079F DIAST BP 80-89 MM HG: CPT | Mod: CPTII,S$GLB,, | Performed by: FAMILY MEDICINE

## 2025-01-16 PROCEDURE — 99999 PR PBB SHADOW E&M-EST. PATIENT-LVL IV: CPT | Mod: PBBFAC,,, | Performed by: FAMILY MEDICINE

## 2025-01-16 PROCEDURE — 80053 COMPREHEN METABOLIC PANEL: CPT | Performed by: FAMILY MEDICINE

## 2025-01-16 PROCEDURE — 80061 LIPID PANEL: CPT | Performed by: FAMILY MEDICINE

## 2025-01-16 PROCEDURE — 86431 RHEUMATOID FACTOR QUANT: CPT | Performed by: FAMILY MEDICINE

## 2025-01-16 PROCEDURE — 85651 RBC SED RATE NONAUTOMATED: CPT | Mod: PO | Performed by: FAMILY MEDICINE

## 2025-01-16 PROCEDURE — 86140 C-REACTIVE PROTEIN: CPT | Performed by: FAMILY MEDICINE

## 2025-01-16 PROCEDURE — 3075F SYST BP GE 130 - 139MM HG: CPT | Mod: CPTII,S$GLB,, | Performed by: FAMILY MEDICINE

## 2025-01-16 PROCEDURE — 3288F FALL RISK ASSESSMENT DOCD: CPT | Mod: CPTII,S$GLB,, | Performed by: FAMILY MEDICINE

## 2025-01-16 PROCEDURE — 73130 X-RAY EXAM OF HAND: CPT | Mod: 26,50,, | Performed by: RADIOLOGY

## 2025-01-16 PROCEDURE — 1101F PT FALLS ASSESS-DOCD LE1/YR: CPT | Mod: CPTII,S$GLB,, | Performed by: FAMILY MEDICINE

## 2025-01-16 PROCEDURE — 1125F AMNT PAIN NOTED PAIN PRSNT: CPT | Mod: CPTII,S$GLB,, | Performed by: FAMILY MEDICINE

## 2025-01-16 RX ORDER — MELOXICAM 15 MG/1
15 TABLET ORAL DAILY
COMMUNITY
End: 2025-01-16 | Stop reason: SDUPTHER

## 2025-01-16 RX ORDER — FLUTICASONE PROPIONATE 50 MCG
1 SPRAY, SUSPENSION (ML) NASAL DAILY
Qty: 16 G | Refills: 5 | Status: SHIPPED | OUTPATIENT
Start: 2025-01-16

## 2025-01-16 RX ORDER — LANOLIN ALCOHOL/MO/W.PET/CERES
400 CREAM (GRAM) TOPICAL DAILY
Qty: 90 TABLET | Refills: 1 | Status: SHIPPED | OUTPATIENT
Start: 2025-01-16

## 2025-01-16 RX ORDER — SPIRONOLACTONE 25 MG/1
25 TABLET ORAL DAILY
Qty: 90 TABLET | Refills: 1 | Status: SHIPPED | OUTPATIENT
Start: 2025-01-16

## 2025-01-16 RX ORDER — ALPRAZOLAM 0.5 MG/1
0.5 TABLET ORAL NIGHTLY PRN
Qty: 30 TABLET | Refills: 0 | Status: SHIPPED | OUTPATIENT
Start: 2025-01-16

## 2025-01-16 RX ORDER — PANTOPRAZOLE SODIUM 40 MG/1
40 TABLET, DELAYED RELEASE ORAL DAILY
Qty: 90 TABLET | Refills: 1 | Status: SHIPPED | OUTPATIENT
Start: 2025-01-16

## 2025-01-16 RX ORDER — MELOXICAM 15 MG/1
15 TABLET ORAL DAILY
Qty: 30 TABLET | Refills: 2 | Status: SHIPPED | OUTPATIENT
Start: 2025-01-16

## 2025-01-16 RX ORDER — LEVOCETIRIZINE DIHYDROCHLORIDE 5 MG/1
5 TABLET, FILM COATED ORAL NIGHTLY
Qty: 90 TABLET | Refills: 1 | Status: SHIPPED | OUTPATIENT
Start: 2025-01-16

## 2025-01-16 NOTE — PROGRESS NOTES
SCRIBE #1 NOTE: I, Riccardo Senior, am scribing for, and in the presence of,  Tanvir Cisneros III, MD. I have scribed the entire note.     Subjective:       Patient ID: Paula Hanna is a 65 y.o. female.    Chief Complaint: Follow-up (Rx refills)    Ms. Hanna is here for a follow up after her last appointment on May 2, 2024. Paresthesia of left foot. States her left foot has a numb sensation only bothers her when she is walking. She states it feels as though it is expanding in her shoe. Reports certain shoes bother her foot. 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 States her left knee is also starting to hurt now too. BMI of 33.04. Cardiovascular good. No chest pain. No palpitations. Blood pressure is 132/84. Hypertension controlled. Using aspirin 81mg. Psoriatic arthritis. She is not taking anything for it. States it does flare up every now and then. States her fingers will lock up and bother her. They do pop. Anxiety. She was taking Xanax. Depression. States Lexapro made her feel bad. She does have her moments. States it is not daily. She did lose her son in an accident recently. UTI. On Macrobid, and she has her last dose for today. Menopause. She is on hormone patches. Lumbar disc disease. Colon polyp. Colonoscopy is being done on February 4, 2025 with Dr. Ruiz. Mammogram is current. Pneumonia vaccine was discussed. Flu vaccine was discussed, and she would like it today.     Review of Systems   Constitutional:  Negative for chills and fever.   HENT:  Negative for congestion and sore throat.    Eyes:  Negative for visual disturbance.   Respiratory:  Negative for chest tightness and shortness of breath.    Cardiovascular:  Negative for chest pain.   Gastrointestinal:  Negative for nausea.   Endocrine: Negative for polydipsia and polyuria.   Genitourinary:  Negative for dysuria and flank pain.   Musculoskeletal:  Positive for arthralgias. Negative for back pain, neck pain and neck stiffness.   Skin:  Negative for  rash.   Neurological:  Positive for numbness. Negative for weakness.   Hematological:  Does not bruise/bleed easily.   Psychiatric/Behavioral:  Negative for behavioral problems.    All other systems reviewed and are negative.      Objective:      Physical examination: Vital signs noted. No acute distress. No carotid bruit. Regular heart rate and rhythm. Lungs clear to auscultation bilaterally. Abdomen bowel sounds positive soft and nontender. Extremities without edema. 2+ pedal pulses. Hands have no deformities. SLR is negative.       Assessment:       1. Hypertension, essential    2. Aspirin long-term use    3. Anxiety    4. Depression, unspecified depression type    5. Lumbar disc disease    6. Paresthesia of left foot    7. Polyp of colon, unspecified part of colon, unspecified type    8. Urinary tract infection without hematuria, site unspecified    9. Menopause    10. Psoriatic arthritis    11. Need for influenza vaccination    12. Vitamin B12 deficiency    13. Abnormal finding of blood chemistry, unspecified        Plan:       Hypertension, essential  -     Hemoglobin A1C; Future; Expected date: 01/16/2025  -     Comprehensive Metabolic Panel; Future; Expected date: 01/16/2025  -     Lipid Panel; Future; Expected date: 01/16/2025    Aspirin long-term use  -     CBC Auto Differential; Future; Expected date: 01/16/2025    Anxiety  -     TSH; Future; Expected date: 01/16/2025  -     ALPRAZolam (XANAX) 0.5 MG tablet; Take 1 tablet (0.5 mg total) by mouth nightly as needed for Anxiety.  Dispense: 30 tablet; Refill: 0    Depression, unspecified depression type  -     TSH; Future; Expected date: 01/16/2025    Lumbar disc disease    Paresthesia of left foot  -     Hemoglobin A1C; Future; Expected date: 01/16/2025    Polyp of colon, unspecified part of colon, unspecified type    Urinary tract infection without hematuria, site unspecified    Menopause  -     Hemoglobin A1C; Future; Expected date: 01/16/2025    Psoriatic  arthritis  -     Sedimentation rate; Future; Expected date: 01/16/2025  -     C-Reactive Protein; Future; Expected date: 01/16/2025  -     RAKAN Screen w/Reflex; Future; Expected date: 01/16/2025  -     Rheumatoid Factor; Future; Expected date: 01/16/2025  -     Vitamin B12; Future; Expected date: 01/16/2025  -     X-Ray Hand 3 View Bilateral; Future; Expected date: 01/16/2025    Need for influenza vaccination  -     influenza (adjuvanted) (Fluad) 45 mcg/0.5 mL IM vaccine (> or = 64 yo) 0.5 mL    Vitamin B12 deficiency  -     Vitamin B12; Future; Expected date: 01/16/2025    Abnormal finding of blood chemistry, unspecified  -     Hemoglobin A1C; Future; Expected date: 01/16/2025    Other orders  -     meloxicam (MOBIC) 15 MG tablet; Take 1 tablet (15 mg total) by mouth once daily.  Dispense: 30 tablet; Refill: 2  -     fluticasone propionate (FLONASE) 50 mcg/actuation nasal spray; 1 spray (50 mcg total) by Each Nostril route once daily.  Dispense: 16 g; Refill: 5  -     magnesium oxide (MAG-OX) 400 mg (241.3 mg magnesium) tablet; Take 1 tablet (400 mg total) by mouth once daily.  Dispense: 90 tablet; Refill: 1  -     pantoprazole (PROTONIX) 40 MG tablet; Take 1 tablet (40 mg total) by mouth once daily.  Dispense: 90 tablet; Refill: 1  -     spironolactone (ALDACTONE) 25 MG tablet; Take 1 tablet (25 mg total) by mouth once daily.  Dispense: 90 tablet; Refill: 1  -     levocetirizine (XYZAL) 5 MG tablet; Take 1 tablet (5 mg total) by mouth every evening.  Dispense: 90 tablet; Refill: 1    Xanax 0.5 p.r.n. only.  Thirty pills.  Check sed rate CRP RAKAN rheumatoid factor TSH A1c B12 CBC CMP and lipids.  Flu shot high-dose.  Prevnar 20 recommended we are out today.  X-ray of the hands look for sign of arthritis there.  Mobic 15 mg daily p.r.n. 30 with 2 refills.  Follow-up 3 months.  All care gaps addressed or discussed.     ITanvir III, MD, personally performed the services described in this documentation. All  medical record entries made by the scribe were at my direction and in my presence. I have reviewed the chart and agree that the record reflects my personal performance and is accurate and complete.

## 2025-01-17 LAB
ANA SER QL IF: NORMAL
RHEUMATOID FACT SERPL-ACNC: <13 IU/ML (ref 0–15)

## 2025-01-18 PROBLEM — R20.2 PARESTHESIA OF LEFT FOOT: Status: ACTIVE | Noted: 2025-01-18

## 2025-01-18 PROBLEM — M51.9 LUMBAR DISC DISEASE: Status: ACTIVE | Noted: 2025-01-18

## 2025-01-18 PROBLEM — F32.A DEPRESSION: Status: ACTIVE | Noted: 2025-01-18

## 2025-01-18 PROBLEM — N39.0 URINARY TRACT INFECTION WITHOUT HEMATURIA: Status: ACTIVE | Noted: 2025-01-18

## 2025-01-27 ENCOUNTER — OCHSNER VIRTUAL EMERGENCY DEPARTMENT (OUTPATIENT)
Facility: CLINIC | Age: 65
End: 2025-01-27
Payer: MEDICARE

## 2025-01-30 ENCOUNTER — OFFICE VISIT (OUTPATIENT)
Dept: FAMILY MEDICINE | Facility: CLINIC | Age: 65
End: 2025-01-30
Payer: MEDICARE

## 2025-01-30 VITALS
RESPIRATION RATE: 18 BRPM | WEIGHT: 186.5 LBS | BODY MASS INDEX: 33.04 KG/M2 | SYSTOLIC BLOOD PRESSURE: 136 MMHG | OXYGEN SATURATION: 98 % | HEIGHT: 63 IN | DIASTOLIC BLOOD PRESSURE: 76 MMHG | TEMPERATURE: 98 F | HEART RATE: 74 BPM

## 2025-01-30 DIAGNOSIS — M51.9 LUMBAR DISC DISEASE: Primary | ICD-10-CM

## 2025-01-30 DIAGNOSIS — N95.2 VAGINAL ATROPHY: ICD-10-CM

## 2025-01-30 DIAGNOSIS — R30.0 DYSURIA: ICD-10-CM

## 2025-01-30 DIAGNOSIS — M54.16 LUMBAR RADICULOPATHY, ACUTE: ICD-10-CM

## 2025-01-30 LAB
BILIRUBIN, UA POC OHS: NEGATIVE
BLOOD, UA POC OHS: NEGATIVE
CLARITY, UA POC OHS: CLEAR
COLOR, UA POC OHS: YELLOW
GLUCOSE, UA POC OHS: NEGATIVE
KETONES, UA POC OHS: 15
LEUKOCYTES, UA POC OHS: NEGATIVE
NITRITE, UA POC OHS: NEGATIVE
PH, UA POC OHS: 5.5
PROTEIN, UA POC OHS: 30
SPECIFIC GRAVITY, UA POC OHS: >=1.03
UROBILINOGEN, UA POC OHS: 1

## 2025-01-30 PROCEDURE — 1126F AMNT PAIN NOTED NONE PRSNT: CPT | Mod: CPTII,S$GLB,,

## 2025-01-30 PROCEDURE — 99214 OFFICE O/P EST MOD 30 MIN: CPT | Mod: S$GLB,,,

## 2025-01-30 PROCEDURE — 1159F MED LIST DOCD IN RCRD: CPT | Mod: CPTII,S$GLB,,

## 2025-01-30 PROCEDURE — 81003 URINALYSIS AUTO W/O SCOPE: CPT | Mod: QW,S$GLB,,

## 2025-01-30 PROCEDURE — 1101F PT FALLS ASSESS-DOCD LE1/YR: CPT | Mod: CPTII,S$GLB,,

## 2025-01-30 PROCEDURE — 3288F FALL RISK ASSESSMENT DOCD: CPT | Mod: CPTII,S$GLB,,

## 2025-01-30 PROCEDURE — 3008F BODY MASS INDEX DOCD: CPT | Mod: CPTII,S$GLB,,

## 2025-01-30 PROCEDURE — 3044F HG A1C LEVEL LT 7.0%: CPT | Mod: CPTII,S$GLB,,

## 2025-01-30 PROCEDURE — 3078F DIAST BP <80 MM HG: CPT | Mod: CPTII,S$GLB,,

## 2025-01-30 PROCEDURE — 99999 PR PBB SHADOW E&M-EST. PATIENT-LVL V: CPT | Mod: PBBFAC,,,

## 2025-01-30 PROCEDURE — 3075F SYST BP GE 130 - 139MM HG: CPT | Mod: CPTII,S$GLB,,

## 2025-01-30 RX ORDER — POLYETHYLENE GLYCOL 3350, SODIUM SULFATE, POTASSIUM CHLORIDE, MAGNESIUM SULFATE, AND SODIUM CHLORIDE FOR ORAL SOLUTION 178.7-7.3G
1 KIT ORAL
COMMUNITY
Start: 2024-12-31

## 2025-01-30 NOTE — PROGRESS NOTES
Subjective:       Patient ID: Paula Hanna is a 65 y.o. female.    Chief Complaint: Low-back Pain and Flank Pain    Paula Hanna is a 65 y.o. female patient that presents to clinic with complaints of back pain.  Pain is on her left side and radiates down into her left buttocks.   Recently treated for uti with nitrofurantion.  Her symptoms improved.  She now feels an irritation or pressure when she urinates.  She was prescribed estrace vaginal cream by Dr. Guaman for vaginal atrophy but never started it.  She does have a history of lumbar disc disease with radiculopathy.  She took a meloxicam which did help.  She is concerned her UTI is not resolved.       Review of patient's allergies indicates:   Allergen Reactions    Statins-hmg-coa reductase inhibitors Other (See Comments)     Muscle and joint pain      Lisinopril Other (See Comments)     COUGH, PATIENT ADVICE MSS 4/2/24    Milk containing products (dairy)     Naproxen Itching    Penicillins Itching     Pt states can take Keflex    Sulfa (sulfonamide antibiotics) Itching     Social Drivers of Health     Tobacco Use: Medium Risk (1/30/2025)    Patient History     Smoking Tobacco Use: Former     Smokeless Tobacco Use: Never     Passive Exposure: Not on file   Alcohol Use: Not At Risk (4/21/2024)    AUDIT-C     Frequency of Alcohol Consumption: Never     Average Number of Drinks: Patient does not drink     Frequency of Binge Drinking: Never   Financial Resource Strain: Medium Risk (4/21/2024)    Overall Financial Resource Strain (CARDIA)     Difficulty of Paying Living Expenses: Somewhat hard   Food Insecurity: Food Insecurity Present (4/21/2024)    Hunger Vital Sign     Worried About Running Out of Food in the Last Year: Sometimes true     Ran Out of Food in the Last Year: Never true   Transportation Needs: No Transportation Needs (4/21/2024)    PRAPARE - Transportation     Lack of Transportation (Medical): No     Lack of Transportation (Non-Medical):  No   Physical Activity: Inactive (4/21/2024)    Exercise Vital Sign     Days of Exercise per Week: 0 days     Minutes of Exercise per Session: 0 min   Stress: Stress Concern Present (4/21/2024)    [employer] [place] [employer] - Occupational Stress Questionnaire     Feeling of Stress : Rather much   Housing Stability: Low Risk  (4/21/2024)    Housing Stability Vital Sign     Unable to Pay for Housing in the Last Year: No     Homeless in the Last Year: No   Depression: Medium Risk (1/16/2025)    Depression     Last PHQ-4: Flowsheet Data: 4   Utilities: Not on file   Health Literacy: Adequate Health Literacy (4/21/2024)     Health Literacy     Frequency of need for help with medical instructions: Never   Social Isolation: Not on file      Past Medical History:   Diagnosis Date    Anxiety     Back pain     Degenerative disc disease     GERD (gastroesophageal reflux disease)     Heel spur     Hypertension     Hypertrophy of both inferior nasal turbinates     Nasal septal deviation     Psoriasis     Sciatica       Past Surgical History:   Procedure Laterality Date    COLON SURGERY      Cecal tumor/right side    HYSTERECTOMY  1999    RODOLFO with ? BSO    Ileocolectomy  2000        KNEE ARTHROSCOPY W/ DEBRIDEMENT      x 2, right    LAPAROSCOPIC CHOLECYSTECTOMY N/A 05/16/2021    Procedure: CHOLECYSTECTOMY, LAPAROSCOPIC;  Surgeon: Pepe Cool MD;  Location: [facility];  Service: General;  Laterality: N/A;    LUMBAR EPIDURAL INJECTION      TONSILLECTOMY      WRIST SURGERY Right     plate      Social History     Socioeconomic History    Marital status:          Current Outpatient Medications:     ALPRAZolam (XANAX) 0.5 MG tablet, Take 1 tablet (0.5 mg total) by mouth nightly as needed for Anxiety., Disp: 30 tablet, Rfl: 0    amLODIPine (NORVASC) 2.5 MG tablet, TAKE 1 TABLET(2.5 MG) BY MOUTH DAILY, Disp: 90 tablet, Rfl: 2    aspirin (ECOTRIN) 81 MG EC tablet, Take 81 mg by mouth once daily., Disp: ,  Rfl:     azelastine (ASTELIN) 137 mcg (0.1 %) nasal spray, 1 spray (137 mcg total) by Nasal route 2 (two) times daily., Disp: 90 mL, Rfl: 1    carvediloL (COREG) 6.25 MG tablet, TAKE 1 TABLET(6.25 MG) BY MOUTH TWICE DAILY WITH MEALS, Disp: 180 tablet, Rfl: 1    clotrimazole-betamethasone 1-0.05% (LOTRISONE) cream, Apply topically 2 (two) times daily. PRN itch, Disp: 45 g, Rfl: 1    EScitalopram oxalate (LEXAPRO) 10 MG tablet, Take 1 tablet (10 mg total) by mouth once daily., Disp: 90 tablet, Rfl: 3    estradioL (VIVELLE-DOT) 0.1 mg/24 hr PTSW, Place 1 patch onto the skin twice a week., Disp: 24 patch, Rfl: 3    fluconazole (DIFLUCAN) 150 MG Tab, Take 1 tablet (150 mg total) by mouth every 72 hours. Take 1 tablets by mouth now and in 72 hours if symptoms still persist, Disp: 2 tablet, Rfl: 0    fluticasone propionate (FLONASE) 50 mcg/actuation nasal spray, 1 spray (50 mcg total) by Each Nostril route once daily., Disp: 16 g, Rfl: 5    ibuprofen (ADVIL,MOTRIN) 800 MG tablet, TAKE 1 TABLET(800 MG) BY MOUTH THREE TIMES DAILY EVERY 8 HOURS AS NEEDED FOR PAIN, Disp: 30 tablet, Rfl: 0    levocetirizine (XYZAL) 5 MG tablet, Take 1 tablet (5 mg total) by mouth every evening., Disp: 90 tablet, Rfl: 1    magnesium oxide (MAG-OX) 400 mg (241.3 mg magnesium) tablet, Take 1 tablet (400 mg total) by mouth once daily., Disp: 90 tablet, Rfl: 1    meloxicam (MOBIC) 15 MG tablet, Take 1 tablet (15 mg total) by mouth once daily., Disp: 30 tablet, Rfl: 2    pantoprazole (PROTONIX) 40 MG tablet, Take 1 tablet (40 mg total) by mouth once daily., Disp: 90 tablet, Rfl: 1    polyethylene glycol (GLYCOLAX) 17 gram/dose powder, Take 17 g by mouth once daily. , Disp: , Rfl: 0    spironolactone (ALDACTONE) 25 MG tablet, Take 1 tablet (25 mg total) by mouth once daily., Disp: 90 tablet, Rfl: 1    SUFLAVE 178.7-7.3-0.5 gram SolR, 1 Bottle., Disp: , Rfl:     estradioL (ESTRACE) 0.01 % (0.1 mg/gram) vaginal cream, Apply small pea-sized amount in  vagina every night for 14 days, then every other night afterwards, Disp: 42.5 g, Rfl: 1    Current Facility-Administered Medications:     influenza (adjuvanted) (Fluad) 45 mcg/0.5 mL IM vaccine (> or = 66 yo) 0.5 mL, 0.5 mL, Intramuscular, 1 time in Clinic/HOD, Tanvir Cisneros III, MD    Lab Results   Component Value Date    WBC 5.28 01/16/2025    HGB 13.2 01/16/2025    HCT 40.4 01/16/2025     01/16/2025    CHOL 185 01/16/2025    TRIG 90 01/16/2025    HDL 56 01/16/2025    ALT 18 01/16/2025    AST 15 01/16/2025     01/16/2025    K 4.1 01/16/2025     01/16/2025    CREATININE 0.8 01/16/2025    BUN 14 01/16/2025    CO2 24 01/16/2025    TSH 0.716 01/16/2025    INR 1.1 05/16/2021    HGBA1C 5.8 (H) 01/16/2025    MICROALBUR 1.6 01/17/2020       Review of Systems   Constitutional: Negative.    Respiratory: Negative.     Cardiovascular: Negative.    Genitourinary:         Vaginal irritation   Musculoskeletal:  Positive for back pain.       Objective:      Physical Exam  Vitals reviewed.   Constitutional:       Appearance: Normal appearance.   Cardiovascular:      Rate and Rhythm: Normal rate.      Pulses: Normal pulses.      Heart sounds: Normal heart sounds.   Pulmonary:      Effort: Pulmonary effort is normal.      Breath sounds: Normal breath sounds.   Abdominal:      Tenderness: There is no right CVA tenderness or left CVA tenderness.   Musculoskeletal:      Lumbar back: Positive left straight leg raise test.   Skin:     General: Skin is warm and dry.   Neurological:      General: No focal deficit present.      Mental Status: She is alert.   Psychiatric:         Mood and Affect: Mood normal.         Thought Content: Thought content normal.         Judgment: Judgment normal.         Assessment:       1. Lumbar disc disease    2. Lumbar radiculopathy, acute    3. Dysuria    4. Vaginal atrophy        Plan:       Paula was seen today for low-back pain and flank pain.    Diagnoses and all orders for this  visit:    Lumbar disc disease    Lumbar radiculopathy, acute    Dysuria  -     POCT Urinalysis(Instrument)    Vaginal atrophy    Urinalysis negative.      Positive SLR left side. Pain likely from lumbar spine.  Continue meloxicam.  If symptoms worse or not improving will repeat xray of her lumbar spine.  Also consider PT    Recommend she start using estrace cream for her vaginal irritation.  Follow up with Dr. Guaman as scheduled.

## 2025-02-21 RX ORDER — IBUPROFEN 800 MG/1
TABLET ORAL
Qty: 30 TABLET | Refills: 0 | Status: SHIPPED | OUTPATIENT
Start: 2025-02-21

## 2025-02-21 NOTE — TELEPHONE ENCOUNTER
Care Due:                  Date            Visit Type   Department     Provider  --------------------------------------------------------------------------------                                [facility]  [facility]      [facility]   [facility]   [place]                              EP -                              PRIMARY      [facility]  Next Visit: 04-      [facility] ([facility])   [facility]  [place]                                                            Last  Test          Frequency    Reason                     Performed    Due Date  --------------------------------------------------------------------------------    Mg Level....  12 months..  magnesium................  06- 06-    [facility] Embedded Care Due Messages. Reference number: [id number].   2/21/2025 9:08:21 AM CST

## 2025-03-12 ENCOUNTER — OFFICE VISIT (OUTPATIENT)
Dept: OBSTETRICS AND GYNECOLOGY | Facility: CLINIC | Age: 65
End: 2025-03-12
Payer: MEDICARE

## 2025-03-12 VITALS
SYSTOLIC BLOOD PRESSURE: 152 MMHG | BODY MASS INDEX: 33.48 KG/M2 | WEIGHT: 188.94 LBS | DIASTOLIC BLOOD PRESSURE: 70 MMHG | HEIGHT: 63 IN

## 2025-03-12 DIAGNOSIS — N95.2 VAGINAL ATROPHY: Primary | ICD-10-CM

## 2025-03-12 DIAGNOSIS — R32 URINARY INCONTINENCE IN FEMALE: ICD-10-CM

## 2025-03-12 PROCEDURE — 3078F DIAST BP <80 MM HG: CPT | Mod: CPTII,S$GLB,, | Performed by: OBSTETRICS & GYNECOLOGY

## 2025-03-12 PROCEDURE — 3008F BODY MASS INDEX DOCD: CPT | Mod: CPTII,S$GLB,, | Performed by: OBSTETRICS & GYNECOLOGY

## 2025-03-12 PROCEDURE — 3044F HG A1C LEVEL LT 7.0%: CPT | Mod: CPTII,S$GLB,, | Performed by: OBSTETRICS & GYNECOLOGY

## 2025-03-12 PROCEDURE — 3077F SYST BP >= 140 MM HG: CPT | Mod: CPTII,S$GLB,, | Performed by: OBSTETRICS & GYNECOLOGY

## 2025-03-12 PROCEDURE — 99213 OFFICE O/P EST LOW 20 MIN: CPT | Mod: S$GLB,,, | Performed by: OBSTETRICS & GYNECOLOGY

## 2025-03-12 PROCEDURE — 1159F MED LIST DOCD IN RCRD: CPT | Mod: CPTII,S$GLB,, | Performed by: OBSTETRICS & GYNECOLOGY

## 2025-03-12 PROCEDURE — 99999 PR PBB SHADOW E&M-EST. PATIENT-LVL III: CPT | Mod: PBBFAC,,, | Performed by: OBSTETRICS & GYNECOLOGY

## 2025-03-12 RX ORDER — ESTRADIOL 10 UG/1
10 TABLET, FILM COATED VAGINAL
Qty: 24 TABLET | Refills: 3 | Status: SHIPPED | OUTPATIENT
Start: 2025-03-13 | End: 2026-03-13

## 2025-03-12 NOTE — PROGRESS NOTES
[facility] Note    Pertinent Med & GYN Problem List    Hx RODOLFO ? BSO  Ileostomy    Subjective:   Chief Complaint:  Follow-up (3 month follow up)    Date: 2024     Patient ID: Paula Hanna is a  65 y.o. female.    HRT:  Transdermal estrogen    No LMP recorded. Patient has had a hysterectomy.    The patient presents today due to the following:    The patient reports issues with internal and external vaginal discharge and irritation.  In addition she recently noted some postcoital bleeding.  The itching is primarily external.  In addition she reports some issues with urinary incontinence associated with coughing.    Date: 3/12/2025    The patient presents today for follow-up.  She was last seen as above.  She reports an improvement with vaginal estrogen but would prefer an alternative treatment to the topical cream.  No pelvic pain and no vaginal bleeding.    GYN & OB History  LMP: No LMP recorded. Patient has had a hysterectomy.     Age of Menarche:12  Age at first pregnancy:    Age at first live birth:18  Number of months breastfeeding:    Age at Menopause:54   Comments:     OB History          3    Para   3    Term   3            AB   0    Living   1         SAB   0    IAB        Ectopic        Multiple        Live Births               Obstetric Comments   Vaginal delivery x3 with one stillborn and the recent passing of one of her children               Allergies:   Review of patient's allergies indicates:   Allergen Reactions    Statins-hmg-coa reductase inhibitors Other (See Comments)     Muscle and joint pain      Lisinopril Other (See Comments)     COUGH, PATIENT ADVICE MSSG 24    Milk containing products (dairy)     Naproxen Itching    Penicillins Itching     Pt states can take Keflex    Sulfa (sulfonamide antibiotics) Itching       Past Medical History:   Diagnosis Date    Anxiety     Back pain     Degenerative disc disease     GERD  (gastroesophageal reflux disease)     Heel spur     Hypertension     Hypertrophy of both inferior nasal turbinates     Nasal septal deviation     Psoriasis     Sciatica        Past Surgical History:   Procedure Laterality Date    COLON SURGERY      Cecal tumor/right side    HYSTERECTOMY  1999    RODOLFO with ? BSO    Ileocolectomy  2000        KNEE ARTHROSCOPY W/ DEBRIDEMENT      x 2, right    LAPAROSCOPIC CHOLECYSTECTOMY N/A 05/16/2021    Procedure: CHOLECYSTECTOMY, LAPAROSCOPIC;  Surgeon: Pepe Cool MD;  Location: [facility];  Service: General;  Laterality: N/A;    LUMBAR EPIDURAL INJECTION      TONSILLECTOMY      WRIST SURGERY Right     plate       Medications    Current Outpatient Medications:     ALPRAZolam (XANAX) 0.5 MG tablet, Take 1 tablet (0.5 mg total) by mouth nightly as needed for Anxiety., Disp: 30 tablet, Rfl: 0    amLODIPine (NORVASC) 2.5 MG tablet, TAKE 1 TABLET(2.5 MG) BY MOUTH DAILY, Disp: 90 tablet, Rfl: 2    aspirin (ECOTRIN) 81 MG EC tablet, Take 81 mg by mouth once daily., Disp: , Rfl:     carvediloL (COREG) 6.25 MG tablet, TAKE 1 TABLET(6.25 MG) BY MOUTH TWICE DAILY WITH MEALS, Disp: 180 tablet, Rfl: 1    clotrimazole-betamethasone 1-0.05% (LOTRISONE) cream, Apply topically 2 (two) times daily. PRN itch, Disp: 45 g, Rfl: 1    EScitalopram oxalate (LEXAPRO) 10 MG tablet, Take 1 tablet (10 mg total) by mouth once daily., Disp: 90 tablet, Rfl: 3    estradioL (VIVELLE-DOT) 0.1 mg/24 hr PTSW, Place 1 patch onto the skin twice a week., Disp: 24 patch, Rfl: 3    fluconazole (DIFLUCAN) 150 MG Tab, Take 1 tablet (150 mg total) by mouth every 72 hours. Take 1 tablets by mouth now and in 72 hours if symptoms still persist, Disp: 2 tablet, Rfl: 0    fluticasone propionate (FLONASE) 50 mcg/actuation nasal spray, 1 spray (50 mcg total) by Each Nostril route once daily., Disp: 16 g, Rfl: 5    ibuprofen (ADVIL,MOTRIN) 800 MG tablet, TAKE 1 TABLET(800 MG) BY MOUTH THREE TIMES DAILY EVERY 8 HOURS AS  NEEDED FOR PAIN, Disp: 30 tablet, Rfl: 0    levocetirizine (XYZAL) 5 MG tablet, Take 1 tablet (5 mg total) by mouth every evening., Disp: 90 tablet, Rfl: 1    magnesium oxide (MAG-OX) 400 mg (241.3 mg magnesium) tablet, Take 1 tablet (400 mg total) by mouth once daily., Disp: 90 tablet, Rfl: 1    meloxicam (MOBIC) 15 MG tablet, Take 1 tablet (15 mg total) by mouth once daily., Disp: 30 tablet, Rfl: 2    pantoprazole (PROTONIX) 40 MG tablet, Take 1 tablet (40 mg total) by mouth once daily., Disp: 90 tablet, Rfl: 1    polyethylene glycol (GLYCOLAX) 17 gram/dose powder, Take 17 g by mouth once daily. , Disp: , Rfl: 0    spironolactone (ALDACTONE) 25 MG tablet, Take 1 tablet (25 mg total) by mouth once daily., Disp: 90 tablet, Rfl: 1    SUFLAVE 178.7-7.3-0.5 gram SolR, 1 Bottle., Disp: , Rfl:     azelastine (ASTELIN) 137 mcg (0.1 %) nasal spray, 1 spray (137 mcg total) by Nasal route 2 (two) times daily., Disp: 90 mL, Rfl: 1    estradioL (VAGIFEM) 10 mcg Tab, Place 1 tablet (10 mcg total) vaginally twice a week., Disp: 24 tablet, Rfl: 3    Current Facility-Administered Medications:     influenza (adjuvanted) (Fluad) 45 mcg/0.5 mL IM vaccine (> or = 64 yo) 0.5 mL, 0.5 mL, Intramuscular, 1 time in Clinic/HOD, Tanvir Cisneros III, MD     Social History     Tobacco Use    Smoking status: Former     Current packs/day: 0.00     Average packs/day: 0.3 packs/day for 22.0 years (5.5 ttl pk-yrs)     Types: Cigarettes     Start date: 3/1/1995     Quit date: 3/1/2017     Years since quittin.0    Smokeless tobacco: Never    Tobacco comments:     less than 1 pack week   Substance Use Topics    Alcohol use: Not Currently     Comment: socially    Drug use: No       Family History   Problem Relation Name Age of Onset    Hypertension Mother Inge     Heart disease Mother Inge     Hypertension Father Wilcous     Cancer Father Wilcous     Diabetes Sister Hedy     Hypertension Sister Hedy     Diabetes Brother Annie Fink      Hypertension Brother Annie Miller.     Prostate cancer Brother Annie Miller.     Migraines Daughter      Diabetes Son      Ovarian cancer Neg Hx      Breast cancer Neg Hx         Review of Systems (at today's evaluation)  Review of Systems   Constitutional:  Negative for fever and unexpected weight change.   Respiratory:  Negative for cough and shortness of breath.    Cardiovascular:  Negative for chest pain and palpitations.   Gastrointestinal:  Negative for constipation, diarrhea, nausea and vomiting.   Genitourinary:  Positive for bladder incontinence. Negative for dysuria, frequency, hematuria and urgency.        Gyn as per HPI   Neurological:  Negative for headaches.        Objective:      Vitals:    03/12/25 1126   BP: (!) 152/70     Physical Exam:   Constitutional: She appears well-developed and well-nourished. No distress.    HENT:   Head: Normocephalic.     Neck: No thyroid mass present.    Cardiovascular:  Normal rate.             Pulmonary/Chest: Effort normal. No respiratory distress.        Abdominal: Soft. There is no abdominal tenderness.             Musculoskeletal: Normal range of motion.      Right lower leg: No edema.      Left lower leg: No edema.       Neurological: She is alert.   No gross lesions noted.    Skin: Skin is warm and dry.    Psychiatric: She has a normal mood and affect. Her speech is normal and behavior is normal. Mood normal.         Assessment:        1. Vaginal atrophy    2. Urinary incontinence in female      Plan:      Vaginal atrophy  -     estradioL (VAGIFEM) 10 mcg Tab; Place 1 tablet (10 mcg total) vaginally twice a week.  Dispense: 24 tablet; Refill: 3    Urinary incontinence in female      Follow up for as needed / for any GYN related issues.     The above was reviewed discussed with the patient.  Options reviewed and at this time the patient will be started on a 10 mcg vaginal estrogen tablet.  The pros, cons, risks, benefits, alternatives and indications of the  medication(s) prescribed, as well as appropriate use and potential side effects were discussed.  We discussed issues and relevant risks associated with the medicine prescribed.  Oncologic and cardiovascular issues associated hormone replacement therapy were discussed.  We will base further evaluation treatment on the patient's response to her medical intervention.      The patient's questions were answered, and she is in agreement with the current plan.     Anshu Guaman MD  Department OBGYN [facility]       13.2   11.83 )-----------( 191      ( 13 Aug 2019 04:52 )             38.8     13 Aug 2019 04:52    130    |  90     |  21     ----------------------------<  189    3.1     |  30     |  1.46     Ca    9.4        13 Aug 2019 04:52    TPro  7.9    /  Alb  3.5    /  TBili  1.1    /  DBili  x      /  AST  69     /  ALT  58     /  AlkPhos  73     13 Aug 2019 04:52    LIVER FUNCTIONS - ( 13 Aug 2019 04:52 )  Alb: 3.5 g/dL / Pro: 7.9 gm/dL / ALK PHOS: 73 U/L / ALT: 58 U/L / AST: 69 U/L / GGT: x           PT/INR - ( 13 Aug 2019 04:52 )   PT: 14.2 sec;   INR: 1.27 ratio         PTT - ( 13 Aug 2019 04:52 )  PTT:30.4 sec  CAPILLARY BLOOD GLUCOSE            Urinalysis Basic - ( 13 Aug 2019 06:10 )    Color: Yellow / Appearance: Clear / S.010 / pH: x  Gluc: x / Ketone: Negative  / Bili: Negative / Urobili: Negative mg/dL   Blood: x / Protein: 30 mg/dL / Nitrite: Negative   Leuk Esterase: Moderate / RBC: 3-5 /HPF / WBC 11-25   Sq Epi: x / Non Sq Epi: Occasional / Bacteria: Few

## 2025-03-23 PROBLEM — R22.31 MASS OF RIGHT AXILLA: Status: RESOLVED | Noted: 2017-11-28 | Resolved: 2025-03-23

## 2025-03-24 DIAGNOSIS — Z00.00 ENCOUNTER FOR MEDICARE ANNUAL WELLNESS EXAM: ICD-10-CM

## 2025-03-25 ENCOUNTER — PATIENT MESSAGE (OUTPATIENT)
Dept: OBSTETRICS AND GYNECOLOGY | Facility: CLINIC | Age: 65
End: 2025-03-25
Payer: MEDICARE

## 2025-03-25 ENCOUNTER — TELEPHONE (OUTPATIENT)
Dept: GASTROENTEROLOGY | Facility: CLINIC | Age: 65
End: 2025-03-25
Payer: MEDICARE

## 2025-03-25 DIAGNOSIS — Z12.11 SCREENING FOR COLON CANCER: ICD-10-CM

## 2025-03-25 DIAGNOSIS — Z86.0100 HISTORY OF COLON POLYPS: Primary | ICD-10-CM

## 2025-03-25 NOTE — TELEPHONE ENCOUNTER
----- Message from Zane More MD sent at 3/25/2025 11:36 AM CDT -----  Patient due for colonoscopy for history of polyps.  Please schedule.

## 2025-04-09 DIAGNOSIS — I10 HYPERTENSION, ESSENTIAL: ICD-10-CM

## 2025-04-10 ENCOUNTER — PATIENT MESSAGE (OUTPATIENT)
Dept: FAMILY MEDICINE | Facility: CLINIC | Age: 65
End: 2025-04-10
Payer: MEDICARE

## 2025-04-10 RX ORDER — CARVEDILOL 6.25 MG/1
6.25 TABLET ORAL 2 TIMES DAILY
Qty: 180 TABLET | Refills: 3 | Status: SHIPPED | OUTPATIENT
Start: 2025-04-10

## 2025-04-10 NOTE — TELEPHONE ENCOUNTER
Refill Routing Note   Medication(s) are not appropriate for processing by [facility] for the following reason(s):        Required vitals abnormal    [facility] action(s):  Defer             Appointments  past 12m or future 3m with PCP    Date Provider   Last Visit   1/16/2025 Tanvir Cisneros III, MD   Next Visit   4/17/2025 Tanvir Cisneros III, MD   ED visits in past 90 days: 0        Note composed:10:12 PM 04/09/2025

## 2025-04-10 NOTE — TELEPHONE ENCOUNTER
No care due was identified.  Health [facility] Embedded Care Due Messages. Reference number: [id number].   4/09/2025 10:06:44 PM CDT

## 2025-04-16 ENCOUNTER — OFFICE VISIT (OUTPATIENT)
Dept: FAMILY MEDICINE | Facility: CLINIC | Age: 65
End: 2025-04-16
Payer: MEDICARE

## 2025-04-16 VITALS
BODY MASS INDEX: 32.84 KG/M2 | TEMPERATURE: 98 F | HEIGHT: 63 IN | HEART RATE: 72 BPM | SYSTOLIC BLOOD PRESSURE: 122 MMHG | DIASTOLIC BLOOD PRESSURE: 70 MMHG | WEIGHT: 185.31 LBS | OXYGEN SATURATION: 96 %

## 2025-04-16 DIAGNOSIS — Z90.49 S/P CHOLECYSTECTOMY: ICD-10-CM

## 2025-04-16 DIAGNOSIS — I10 HYPERTENSION, ESSENTIAL: Primary | ICD-10-CM

## 2025-04-16 DIAGNOSIS — R79.9 ABNORMAL FINDING OF BLOOD CHEMISTRY, UNSPECIFIED: ICD-10-CM

## 2025-04-16 DIAGNOSIS — M79.662 PAIN IN LEFT LOWER LEG: ICD-10-CM

## 2025-04-16 DIAGNOSIS — Z23 NEED FOR VACCINATION WITH 20-POLYVALENT PNEUMOCOCCAL CONJUGATE VACCINE: ICD-10-CM

## 2025-04-16 DIAGNOSIS — M25.562 LEFT KNEE PAIN, UNSPECIFIED CHRONICITY: ICD-10-CM

## 2025-04-16 DIAGNOSIS — F32.A DEPRESSION, UNSPECIFIED DEPRESSION TYPE: ICD-10-CM

## 2025-04-16 DIAGNOSIS — Z79.82 ASPIRIN LONG-TERM USE: ICD-10-CM

## 2025-04-16 DIAGNOSIS — F41.9 ANXIETY: ICD-10-CM

## 2025-04-16 DIAGNOSIS — K21.9 GASTROESOPHAGEAL REFLUX DISEASE, UNSPECIFIED WHETHER ESOPHAGITIS PRESENT: ICD-10-CM

## 2025-04-16 DIAGNOSIS — K63.5 POLYP OF COLON, UNSPECIFIED PART OF COLON, UNSPECIFIED TYPE: ICD-10-CM

## 2025-04-16 PROCEDURE — 1160F RVW MEDS BY RX/DR IN RCRD: CPT | Mod: CPTII,S$GLB,, | Performed by: FAMILY MEDICINE

## 2025-04-16 PROCEDURE — 3288F FALL RISK ASSESSMENT DOCD: CPT | Mod: CPTII,S$GLB,, | Performed by: FAMILY MEDICINE

## 2025-04-16 PROCEDURE — 1125F AMNT PAIN NOTED PAIN PRSNT: CPT | Mod: CPTII,S$GLB,, | Performed by: FAMILY MEDICINE

## 2025-04-16 PROCEDURE — 3078F DIAST BP <80 MM HG: CPT | Mod: CPTII,S$GLB,, | Performed by: FAMILY MEDICINE

## 2025-04-16 PROCEDURE — 1159F MED LIST DOCD IN RCRD: CPT | Mod: CPTII,S$GLB,, | Performed by: FAMILY MEDICINE

## 2025-04-16 PROCEDURE — 99214 OFFICE O/P EST MOD 30 MIN: CPT | Mod: 25,S$GLB,, | Performed by: FAMILY MEDICINE

## 2025-04-16 PROCEDURE — 3044F HG A1C LEVEL LT 7.0%: CPT | Mod: CPTII,S$GLB,, | Performed by: FAMILY MEDICINE

## 2025-04-16 PROCEDURE — G0009 ADMIN PNEUMOCOCCAL VACCINE: HCPCS | Mod: S$GLB,,, | Performed by: FAMILY MEDICINE

## 2025-04-16 PROCEDURE — 1101F PT FALLS ASSESS-DOCD LE1/YR: CPT | Mod: CPTII,S$GLB,, | Performed by: FAMILY MEDICINE

## 2025-04-16 PROCEDURE — 90677 PCV20 VACCINE IM: CPT | Mod: S$GLB,,, | Performed by: FAMILY MEDICINE

## 2025-04-16 PROCEDURE — 3008F BODY MASS INDEX DOCD: CPT | Mod: CPTII,S$GLB,, | Performed by: FAMILY MEDICINE

## 2025-04-16 PROCEDURE — 99999 PR PBB SHADOW E&M-EST. PATIENT-LVL V: CPT | Mod: PBBFAC,,, | Performed by: FAMILY MEDICINE

## 2025-04-16 PROCEDURE — 3074F SYST BP LT 130 MM HG: CPT | Mod: CPTII,S$GLB,, | Performed by: FAMILY MEDICINE

## 2025-04-16 NOTE — PROGRESS NOTES
SCRIBE #1 NOTE: I, Riccardo Senior, am scribing for, and in the presence of,  Tanvir Cisneros III, MD. I have scribed the entire note.     Subjective:       Patient ID: Paula Hanna is a 65 y.o. female.    Chief Complaint: Follow-up (3 month )    Ms. Hanna is here for a follow up with her last appointment being here on January 30, 2025 with NP Leti. States her left leg has pain across her shin, and she has to sit when this happens. Reports the pain is not the knee but right below it. It is only the left leg. The pain is only there when she is on her leg. She did have a steroid shot in her knee. S/P cholecystectomy. BMI of 32.82. Cardiovascular good. No chest pain. No palpitations. Blood pressure is 122/70. Hypertension controlled. Prediabetes. A1C was 5.8 in January 2025. Blood glucose was 96. Using aspirin. Depression. Anxiety. She is in the process of getting custody for her 14 year old grandson. Her son was killed in a motor vehicle accident in November 2024. The mother of her grandson left 5 years ago, but she wants him back now. Gastroesophageal reflux disease. States this is okay as long as she avoids fatty food. Colon polyp. Colonoscopy is being done in May 2025 with Dr. Callahan. She used Dr. Ruiz before. Mammogram is due in June 2025. Pneumococcal vaccine was discussed, and she would like it.     Review of Systems   Constitutional:  Negative for chills and fever.   HENT:  Negative for congestion and sore throat.    Eyes:  Negative for visual disturbance.   Respiratory:  Negative for chest tightness and shortness of breath.    Cardiovascular:  Negative for chest pain.   Gastrointestinal:  Negative for nausea.   Endocrine: Negative for polydipsia and polyuria.   Genitourinary:  Negative for dysuria and flank pain.   Musculoskeletal:  Positive for arthralgias. Negative for back pain, neck pain and neck stiffness.   Skin:  Negative for rash.   Neurological:  Negative for weakness.   Hematological:  Does not  bruise/bleed easily.   Psychiatric/Behavioral:  Negative for behavioral problems.    All other systems reviewed and are negative.      Objective:      Physical examination: Vital signs noted. No acute distress. No carotid bruit. Regular heart rate and rhythm. Lungs clear to auscultation bilaterally. Abdomen bowel sounds positive soft and nontender. Extremities without edema. 2+ pedal pulses. Tender along left anterior medial and medial joint line. Francesca test negative.       Assessment:       1. Hypertension, essential    2. Aspirin long-term use    3. BMI 32.0-32.9,adult    4. Polyp of colon, unspecified part of colon, unspecified type    5. Depression, unspecified depression type    6. Anxiety    7. Gastroesophageal reflux disease, unspecified whether esophagitis present    8. S/P cholecystectomy    9. Pain in left lower leg    10. Need for vaccination with 20-polyvalent pneumococcal conjugate vaccine    11. Left knee pain, unspecified chronicity    12. Abnormal finding of blood chemistry, unspecified        Plan:       Hypertension, essential  -     Comprehensive Metabolic Panel; Future; Expected date: 07/16/2025  -     Lipid Panel; Future; Expected date: 07/16/2025  -     Hemoglobin A1C; Future; Expected date: 07/16/2025    Aspirin long-term use    BMI 32.0-32.9,adult    Polyp of colon, unspecified part of colon, unspecified type    Depression, unspecified depression type    Anxiety    Gastroesophageal reflux disease, unspecified whether esophagitis present    S/P cholecystectomy    Pain in left lower leg  -     X-Ray Knee Complete 4 or More Views Left; Future; Expected date: 04/16/2025    Need for vaccination with 20-polyvalent pneumococcal conjugate vaccine  -     pneumoc 20-daljit conj-dip cr(PF) (PREVNAR-20 (PF)) injection Syrg 0.5 mL    Left knee pain, unspecified chronicity  -     X-Ray Knee Complete 4 or More Views Left; Future; Expected date: 04/16/2025    Abnormal finding of blood chemistry,  unspecified  -     Hemoglobin A1C; Future; Expected date: 07/16/2025    Prevnar 20 today.  Colonoscopy in May.  Mammogram soon.  X-ray left knee.  To orthopedics.  Follow-up in 3 months with lipids A1c and CMP.  Blood pressure is under good control.  All care gaps addressed or discussed.     I, Tanvir Cisneros III, MD, personally performed the services described in this documentation. All medical record entries made by the scribe were at my direction and in my presence. I have reviewed the chart and agree that the record reflects my personal performance and is accurate and complete.

## 2025-04-17 ENCOUNTER — RESULTS FOLLOW-UP (OUTPATIENT)
Dept: FAMILY MEDICINE | Facility: CLINIC | Age: 65
End: 2025-04-17

## 2025-04-17 ENCOUNTER — HOSPITAL ENCOUNTER (OUTPATIENT)
Dept: RADIOLOGY | Facility: HOSPITAL | Age: 65
Discharge: HOME | End: 2025-04-17
Attending: FAMILY MEDICINE
Payer: MEDICARE

## 2025-04-17 DIAGNOSIS — M79.662 PAIN IN LEFT LOWER LEG: ICD-10-CM

## 2025-04-17 DIAGNOSIS — M25.562 LEFT KNEE PAIN, UNSPECIFIED CHRONICITY: ICD-10-CM

## 2025-04-17 PROCEDURE — 73564 X-RAY EXAM KNEE 4 OR MORE: CPT | Mod: 26,LT,, | Performed by: RADIOLOGY

## 2025-04-17 PROCEDURE — 73564 X-RAY EXAM KNEE 4 OR MORE: CPT | Mod: TC,PO,LT

## 2025-04-20 PROBLEM — Z90.49 S/P CHOLECYSTECTOMY: Status: ACTIVE | Noted: 2025-04-20

## 2025-04-28 DIAGNOSIS — F41.9 ANXIETY: ICD-10-CM

## 2025-04-28 RX ORDER — ALPRAZOLAM 0.5 MG/1
0.5 TABLET ORAL NIGHTLY PRN
Qty: 30 TABLET | Refills: 0 | Status: SHIPPED | OUTPATIENT
Start: 2025-04-28

## 2025-04-28 RX ORDER — IBUPROFEN 800 MG/1
TABLET ORAL
Qty: 30 TABLET | Refills: 0 | Status: SHIPPED | OUTPATIENT
Start: 2025-04-28

## 2025-04-28 NOTE — TELEPHONE ENCOUNTER
Care Due:                  Date            Visit Type   Department     Provider  --------------------------------------------------------------------------------                                [facility]                              [facility]  Last Visit: 04-      [facility] ([facility])   [facility]                              EP -                              PRIMARY      [facility]  Next Visit: 07-      CARE ([facility])   [facility]                                                            Last  Test          Frequency    Reason                     Performed    Due Date  --------------------------------------------------------------------------------    Mg Level....  12 months..  magnesium................  06- 06-    Health [facility] Embedded Care Due Messages. Reference number: [id number].   4/28/2025 12:01:39 PM CDT

## 2025-04-30 DIAGNOSIS — N95.1 VASOMOTOR SYMPTOMS DUE TO MENOPAUSE: ICD-10-CM

## 2025-04-30 RX ORDER — ESTRADIOL 0.1 MG/D
1 FILM, EXTENDED RELEASE TRANSDERMAL
Qty: 24 PATCH | Refills: 1 | Status: SHIPPED | OUTPATIENT
Start: 2025-05-01 | End: 2026-05-01

## 2025-05-05 ENCOUNTER — TELEPHONE (OUTPATIENT)
Dept: GASTROENTEROLOGY | Facility: CLINIC | Age: 65
End: 2025-05-05
Payer: MEDICARE

## 2025-05-05 NOTE — TELEPHONE ENCOUNTER
----- Message from Magda sent at 5/5/2025 12:15 PM CDT -----  Contact: self  Pt has a procedure appt ten on 5/7/25 that she needs to [facility], she has been having pains behind her ear and started having to take 800 mg ibuprofen and now has taken 4 tabs and might need to take more, so please call back to [facility] at [phone]

## 2025-05-05 NOTE — TELEPHONE ENCOUNTER
Spoke with patient via phone call.   Colonoscopy scheduled on 5/7 rescheduled to 5/29 per patient request.

## 2025-05-06 ENCOUNTER — TELEPHONE (OUTPATIENT)
Dept: FAMILY MEDICINE | Facility: CLINIC | Age: 65
End: 2025-05-06
Payer: MEDICARE

## 2025-05-06 NOTE — TELEPHONE ENCOUNTER
Called pt stated she is at [facility]       ----- Message from Magda sent at 5/5/2025 12:28 PM CDT -----  Contact: self  Type:  Sooner Appointment RequestCaller is requesting a sooner appointment.  Caller declined first available appointment listed below.  Caller will not accept being placed on the waitlist and is requesting a message be sent to doctor.Name of Caller:  pt When is the first available appointment?  Not anytime soon like today or tomorrowSymptoms:  pt is  having pain behind her right ear toward the neck area, even has her scape sore. States she did take one of her xanax but it was at bed time so not sure if this is caused by her nerves but would like to have maybe more tests run to see what could cause it.  This started on Friday 5/2/25 Would the patient rather a call back or a response via [facility]? Call back Best Call Back Number:  [phone] Information:  thanks

## 2025-05-20 DIAGNOSIS — Z12.31 ENCOUNTER FOR SCREENING MAMMOGRAM FOR MALIGNANT NEOPLASM OF BREAST: Primary | ICD-10-CM

## 2025-06-05 ENCOUNTER — HOSPITAL ENCOUNTER (OUTPATIENT)
Dept: RADIOLOGY | Facility: HOSPITAL | Age: 65
Discharge: HOME | End: 2025-06-05
Attending: FAMILY MEDICINE
Payer: MEDICARE

## 2025-06-05 DIAGNOSIS — Z12.31 ENCOUNTER FOR SCREENING MAMMOGRAM FOR MALIGNANT NEOPLASM OF BREAST: ICD-10-CM

## 2025-06-05 PROCEDURE — 77067 SCR MAMMO BI INCL CAD: CPT | Mod: TC,PO

## 2025-06-05 PROCEDURE — 77067 SCR MAMMO BI INCL CAD: CPT | Mod: 26,,, | Performed by: RADIOLOGY

## 2025-06-05 PROCEDURE — 77063 BREAST TOMOSYNTHESIS BI: CPT | Mod: 26,,, | Performed by: RADIOLOGY

## 2025-06-06 ENCOUNTER — TELEPHONE (OUTPATIENT)
Dept: FAMILY MEDICINE | Facility: CLINIC | Age: 65
End: 2025-06-06
Payer: MEDICARE

## 2025-06-06 ENCOUNTER — RESULTS FOLLOW-UP (OUTPATIENT)
Dept: FAMILY MEDICINE | Facility: CLINIC | Age: 65
End: 2025-06-06

## 2025-06-29 ENCOUNTER — NURSE TRIAGE (OUTPATIENT)
Dept: ADMINISTRATIVE | Facility: CLINIC | Age: 65
End: 2025-06-29
Payer: MEDICARE

## 2025-07-09 RX ORDER — IBUPROFEN 800 MG/1
TABLET, FILM COATED ORAL
Qty: 30 TABLET | Refills: 0 | Status: SHIPPED | OUTPATIENT
Start: 2025-07-09

## 2025-07-10 NOTE — TELEPHONE ENCOUNTER
Care Due:                  Date            Visit Type   Department     Provider  --------------------------------------------------------------------------------                                [facility]                              [facility]  Last Visit: 04-      CARE ([facility])   [facility]                              EP -                              [facility]  Next Visit: 07-      [facility] ([facility])   [facility]                                                            Last  Test          Frequency    Reason                     Performed    Due Date  --------------------------------------------------------------------------------    Mg Level....  12 months..  magnesium................  Not Found    Overdue    [employer] Embedded Care Due Messages. Reference number: [id number].   7/09/2025 8:08:43 PM CDT

## 2025-07-15 ENCOUNTER — LAB VISIT (OUTPATIENT)
Dept: LAB | Facility: HOSPITAL | Age: 65
End: 2025-07-15
Attending: FAMILY MEDICINE
Payer: MEDICARE

## 2025-07-15 DIAGNOSIS — I10 HYPERTENSION, ESSENTIAL: ICD-10-CM

## 2025-07-15 DIAGNOSIS — R79.9 ABNORMAL FINDING OF BLOOD CHEMISTRY, UNSPECIFIED: ICD-10-CM

## 2025-07-15 LAB
ALBUMIN SERPL-MCNC: 4.3 G/DL (ref 3.5–5.2)
ALP SERPL-CCNC: 80 UNIT/L (ref 55–135)
ALT SERPL-CCNC: 12 UNIT/L (ref 10–44)
ANION GAP (SMH): 7 MMOL/L (ref 8–16)
AST SERPL-CCNC: 13 UNIT/L (ref 10–40)
BILIRUB SERPL-MCNC: 0.7 MG/DL (ref 0.1–1)
BUN SERPL-MCNC: 13 MG/DL (ref 8–23)
CALCIUM SERPL-MCNC: 9.3 MG/DL (ref 8.7–10.5)
CHLORIDE SERPL-SCNC: 106 MMOL/L (ref 95–110)
CHOLEST SERPL-MCNC: 176 MG/DL (ref 120–199)
CHOLEST/HDLC SERPL: 2.7 {RATIO} (ref 2–5)
CO2 SERPL-SCNC: 27 MMOL/L (ref 23–29)
CREAT SERPL-MCNC: 0.9 MG/DL (ref 0.5–1.4)
EAG (SMH): 131 MG/DL (ref 68–131)
GFR SERPLBLD CREATININE-BSD FMLA CKD-EPI: >60 ML/MIN/1.73/M2
GLUCOSE SERPL-MCNC: 107 MG/DL (ref 70–110)
HBA1C MFR BLD: 6.2 % (ref 4.5–6.2)
HDLC SERPL-MCNC: 65 MG/DL (ref 40–75)
HDLC SERPL: 36.9 % (ref 20–50)
LDLC SERPL CALC-MCNC: 97.8 MG/DL (ref 63–159)
NONHDLC SERPL-MCNC: 111 MG/DL
POTASSIUM SERPL-SCNC: 4.2 MMOL/L (ref 3.5–5.1)
PROT SERPL-MCNC: 7 GM/DL (ref 6–8.4)
SODIUM SERPL-SCNC: 140 MMOL/L (ref 136–145)
TRIGL SERPL-MCNC: 66 MG/DL (ref 30–150)

## 2025-07-15 PROCEDURE — 36415 COLL VENOUS BLD VENIPUNCTURE: CPT

## 2025-07-15 PROCEDURE — 84460 ALANINE AMINO (ALT) (SGPT): CPT

## 2025-07-15 PROCEDURE — 82465 ASSAY BLD/SERUM CHOLESTEROL: CPT

## 2025-07-15 PROCEDURE — 83036 HEMOGLOBIN GLYCOSYLATED A1C: CPT

## 2025-07-17 ENCOUNTER — OFFICE VISIT (OUTPATIENT)
Dept: FAMILY MEDICINE | Facility: CLINIC | Age: 65
End: 2025-07-17
Payer: MEDICARE

## 2025-07-17 VITALS
DIASTOLIC BLOOD PRESSURE: 76 MMHG | HEIGHT: 63 IN | HEART RATE: 66 BPM | BODY MASS INDEX: 32.89 KG/M2 | SYSTOLIC BLOOD PRESSURE: 138 MMHG | OXYGEN SATURATION: 99 % | WEIGHT: 185.63 LBS | TEMPERATURE: 99 F

## 2025-07-17 DIAGNOSIS — K63.5 POLYP OF COLON, UNSPECIFIED PART OF COLON, UNSPECIFIED TYPE: ICD-10-CM

## 2025-07-17 DIAGNOSIS — F41.9 ANXIETY: ICD-10-CM

## 2025-07-17 DIAGNOSIS — K21.9 GASTROESOPHAGEAL REFLUX DISEASE, UNSPECIFIED WHETHER ESOPHAGITIS PRESENT: ICD-10-CM

## 2025-07-17 DIAGNOSIS — Z90.49 S/P COLON RESECTION: ICD-10-CM

## 2025-07-17 DIAGNOSIS — F32.A DEPRESSION, UNSPECIFIED DEPRESSION TYPE: ICD-10-CM

## 2025-07-17 DIAGNOSIS — L40.50 PSORIATIC ARTHRITIS: ICD-10-CM

## 2025-07-17 DIAGNOSIS — Z12.11 SCREENING FOR COLON CANCER: ICD-10-CM

## 2025-07-17 DIAGNOSIS — R73.03 PREDIABETES: ICD-10-CM

## 2025-07-17 DIAGNOSIS — Z79.82 ASPIRIN LONG-TERM USE: ICD-10-CM

## 2025-07-17 DIAGNOSIS — I10 HYPERTENSION, ESSENTIAL: Primary | ICD-10-CM

## 2025-07-17 DIAGNOSIS — M79.672 LEFT FOOT PAIN: ICD-10-CM

## 2025-07-17 PROCEDURE — 3008F BODY MASS INDEX DOCD: CPT | Mod: CPTII,S$GLB,, | Performed by: FAMILY MEDICINE

## 2025-07-17 PROCEDURE — 1101F PT FALLS ASSESS-DOCD LE1/YR: CPT | Mod: CPTII,S$GLB,, | Performed by: FAMILY MEDICINE

## 2025-07-17 PROCEDURE — 1160F RVW MEDS BY RX/DR IN RCRD: CPT | Mod: CPTII,S$GLB,, | Performed by: FAMILY MEDICINE

## 2025-07-17 PROCEDURE — 3078F DIAST BP <80 MM HG: CPT | Mod: CPTII,S$GLB,, | Performed by: FAMILY MEDICINE

## 2025-07-17 PROCEDURE — 99214 OFFICE O/P EST MOD 30 MIN: CPT | Mod: S$GLB,,, | Performed by: FAMILY MEDICINE

## 2025-07-17 PROCEDURE — 3288F FALL RISK ASSESSMENT DOCD: CPT | Mod: CPTII,S$GLB,, | Performed by: FAMILY MEDICINE

## 2025-07-17 PROCEDURE — 3044F HG A1C LEVEL LT 7.0%: CPT | Mod: CPTII,S$GLB,, | Performed by: FAMILY MEDICINE

## 2025-07-17 PROCEDURE — G2211 COMPLEX E/M VISIT ADD ON: HCPCS | Mod: S$GLB,,, | Performed by: FAMILY MEDICINE

## 2025-07-17 PROCEDURE — 3075F SYST BP GE 130 - 139MM HG: CPT | Mod: CPTII,S$GLB,, | Performed by: FAMILY MEDICINE

## 2025-07-17 PROCEDURE — 99999 PR PBB SHADOW E&M-EST. PATIENT-LVL V: CPT | Mod: PBBFAC,,, | Performed by: FAMILY MEDICINE

## 2025-07-17 PROCEDURE — 1126F AMNT PAIN NOTED NONE PRSNT: CPT | Mod: CPTII,S$GLB,, | Performed by: FAMILY MEDICINE

## 2025-07-17 PROCEDURE — 1159F MED LIST DOCD IN RCRD: CPT | Mod: CPTII,S$GLB,, | Performed by: FAMILY MEDICINE

## 2025-07-17 NOTE — PROGRESS NOTES
SCRIBE #1 NOTE: I, Riccardo Senior, am scribing for, and in the presence of,  Tanvir Cisneros III, MD. I have scribed the entire note.     Subjective:       Patient ID: Paula Hanna is a 65 y.o. female.    Chief Complaint: Follow-up (3 month)    Ms. Hanna is here for a follow up with her last appointment being here on April 16, 2025. Left medial foot pain. She has some pain medially near the arch of her left foot. She will get some pain when walking on it at times. States the right foot is okay. S/P cholecystectomy. S/P colon resection. No cancer. She is caring for her 14 year old grandson and is getting him ready for school. BMI of 32.88. Cardiovascular good. No chest pain. No palpitations. Blood pressure is 138/76. Hypertension controlled. Prediabetes. A1C is 6.2, was 5.8. Blood glucose is 107. Using aspirin. Gastroesophageal reflux disease. Using Protonix and Pepcid (from ENT), and this has helped. She does have some dysphagia. Anxiety. Depression. On Lexapro 10mg. Psoriatic arthritis. Colon polyp. Colonoscopy is past due as of December 2024. Mammogram is current. Cholesterol is 176. HDL is 65. LDL is 98.  She is status post colon resection for colon polyp.    Review of Systems   Constitutional:  Negative for chills and fever.   HENT:  Negative for congestion and sore throat.    Eyes:  Negative for visual disturbance.   Respiratory:  Negative for chest tightness and shortness of breath.    Cardiovascular:  Negative for chest pain.   Gastrointestinal:  Negative for nausea.   Endocrine: Negative for polydipsia and polyuria.   Genitourinary:  Negative for dysuria and flank pain.   Musculoskeletal:  Positive for arthralgias (left foot). Negative for back pain, neck pain and neck stiffness.   Skin:  Negative for rash.   Neurological:  Negative for weakness.   Hematological:  Does not bruise/bleed easily.   Psychiatric/Behavioral:  Negative for behavioral problems.    All other systems reviewed and are negative.       Objective:      Physical examination: Vital signs noted. No acute distress. No carotid bruit. Regular heart rate and rhythm. Lungs clear to auscultation bilaterally. Abdomen bowel sounds positive soft and nontender. Extremities without edema. 2+ pedal pulses. Left tarsal/metatarsal joint is tender medially with prominent arch.       Assessment:       1. Hypertension, essential    2. Aspirin long-term use    3. Psoriatic arthritis    4. BMI 32.0-32.9,adult    5. Anxiety    6. Depression, unspecified depression type    7. Gastroesophageal reflux disease, unspecified whether esophagitis present    8. Polyp of colon, unspecified part of colon, unspecified type    9. S/P colon resection    10. Prediabetes    11. Left foot pain    12. Screening for colon cancer        Plan:       Hypertension, essential    Aspirin long-term use    Psoriatic arthritis    BMI 32.0-32.9,adult    Anxiety    Depression, unspecified depression type    Gastroesophageal reflux disease, unspecified whether esophagitis present    Polyp of colon, unspecified part of colon, unspecified type    S/P colon resection    Prediabetes    Left foot pain  -     X-Ray Foot Complete Left; Future; Expected date: 07/17/2025  -     Ambulatory referral/consult to Podiatry; Future; Expected date: 07/24/2025    Screening for colon cancer  -     Ambulatory referral/consult to Gastroenterology; Future; Expected date: 07/24/2025    Colonoscopy ordered.  Follow-up in 3 months.  Low sugar low-fat diet discussed.  X-ray the left foot in see Podiatry.  Arch maybe collapsing some on that left foot.  Continue her aspirin.  All care gaps addressed or discussed.     I, Tanvir Cisneros III, MD, personally performed the services described in this documentation. All medical record entries made by the scribe were at my direction and in my presence. I have reviewed the chart and agree that the record reflects my personal performance and is accurate and complete.

## 2025-07-17 NOTE — PATIENT INSTRUCTIONS
Low fat and sugar diet    Zari Ruiz MD  [address]   [place] [address]  Phone: [phone]  Fax: [phone]      Deniz Knight DPM  [address]  [place] [address]  Phone: [phone]  Fax: [phone]

## 2025-07-18 ENCOUNTER — TELEPHONE (OUTPATIENT)
Dept: GASTROENTEROLOGY | Facility: CLINIC | Age: 65
End: 2025-07-18
Payer: MEDICARE

## 2025-07-18 DIAGNOSIS — Z86.0100 HISTORY OF COLON POLYPS: Primary | ICD-10-CM

## 2025-07-18 NOTE — TELEPHONE ENCOUNTER
Copied from CRM #[id number]. Topic: General Inquiry - Patient Advice  >> Jul 18, 2025  2:08 PM Dina wrote:  Type: Needs Medical Advice  Who Called:  Pt     Best Call Back Number: [phone]    Additional Information: Pt requesting to schedule COL. Pls send message on [facility]

## 2025-07-21 ENCOUNTER — OFFICE VISIT (OUTPATIENT)
Dept: FAMILY MEDICINE | Facility: CLINIC | Age: 65
End: 2025-07-21
Payer: MEDICARE

## 2025-07-21 VITALS
DIASTOLIC BLOOD PRESSURE: 70 MMHG | TEMPERATURE: 98 F | BODY MASS INDEX: 32.81 KG/M2 | SYSTOLIC BLOOD PRESSURE: 122 MMHG | HEART RATE: 69 BPM | WEIGHT: 185.19 LBS | HEIGHT: 63 IN | OXYGEN SATURATION: 99 %

## 2025-07-21 DIAGNOSIS — R73.03 PREDIABETES: ICD-10-CM

## 2025-07-21 DIAGNOSIS — R26.9 ABNORMALITY OF GAIT AND MOBILITY: ICD-10-CM

## 2025-07-21 DIAGNOSIS — Z00.00 ENCOUNTER FOR MEDICARE ANNUAL WELLNESS EXAM: Primary | ICD-10-CM

## 2025-07-21 DIAGNOSIS — I10 HYPERTENSION, ESSENTIAL: ICD-10-CM

## 2025-07-21 PROCEDURE — 3074F SYST BP LT 130 MM HG: CPT | Mod: CPTII,S$GLB,, | Performed by: NURSE PRACTITIONER

## 2025-07-21 PROCEDURE — 3044F HG A1C LEVEL LT 7.0%: CPT | Mod: CPTII,S$GLB,, | Performed by: NURSE PRACTITIONER

## 2025-07-21 PROCEDURE — 99999 PR PBB SHADOW E&M-EST. PATIENT-LVL V: CPT | Mod: PBBFAC,,, | Performed by: NURSE PRACTITIONER

## 2025-07-21 PROCEDURE — 3078F DIAST BP <80 MM HG: CPT | Mod: CPTII,S$GLB,, | Performed by: NURSE PRACTITIONER

## 2025-07-21 PROCEDURE — G0439 PPPS, SUBSEQ VISIT: HCPCS | Mod: S$GLB,,, | Performed by: NURSE PRACTITIONER

## 2025-07-21 PROCEDURE — 1126F AMNT PAIN NOTED NONE PRSNT: CPT | Mod: CPTII,S$GLB,, | Performed by: NURSE PRACTITIONER

## 2025-07-21 PROCEDURE — 3288F FALL RISK ASSESSMENT DOCD: CPT | Mod: CPTII,S$GLB,, | Performed by: NURSE PRACTITIONER

## 2025-07-21 PROCEDURE — 1101F PT FALLS ASSESS-DOCD LE1/YR: CPT | Mod: CPTII,S$GLB,, | Performed by: NURSE PRACTITIONER

## 2025-07-21 PROCEDURE — 1160F RVW MEDS BY RX/DR IN RCRD: CPT | Mod: CPTII,S$GLB,, | Performed by: NURSE PRACTITIONER

## 2025-07-21 PROCEDURE — 1159F MED LIST DOCD IN RCRD: CPT | Mod: CPTII,S$GLB,, | Performed by: NURSE PRACTITIONER

## 2025-07-21 PROCEDURE — 1158F ADVNC CARE PLAN TLK DOCD: CPT | Mod: CPTII,S$GLB,, | Performed by: NURSE PRACTITIONER

## 2025-07-21 PROCEDURE — 1170F FXNL STATUS ASSESSED: CPT | Mod: CPTII,S$GLB,, | Performed by: NURSE PRACTITIONER

## 2025-07-21 RX ORDER — FAMOTIDINE 20 MG/1
20 TABLET, FILM COATED ORAL NIGHTLY
COMMUNITY
Start: 2025-05-21

## 2025-07-21 NOTE — PATIENT INSTRUCTIONS
Counseling and Referral of Other Preventative  (Italic type indicates deductible and co-insurance are waived)    Patient Name: Paula Hanna  Today's Date: 7/21/2025    Health Maintenance       Date Due Completion Date    RSV Vaccine (Age 60+ and Pregnant patients) (1 - Risk 60-74 years 1-dose series) Never done ---    COVID-19 Vaccine (4 - 2024-25 season) 09/01/2024 11/19/2021    Colorectal Cancer Screening 12/17/2024 12/17/2019    Influenza Vaccine (1) 09/01/2025 1/16/2025    Mammogram 06/05/2026 6/5/2025    Hemoglobin A1c (Prediabetes) 07/15/2026 7/15/2025    DEXA Scan 02/21/2029 2/21/2024    Lipid Panel 07/15/2030 7/15/2025    TETANUS VACCINE 03/24/2032 3/24/2022        No orders of the defined types were placed in this encounter.    The following information is provided to all patients.  This information is to help you find resources for any of the problems found today that may be affecting your health:                  Living healthy guide: www.[facility].[place].gov      Understanding Diabetes: www.diabetes.org      Eating healthy: www.cdc.gov/healthyweight      CDC home safety checklist: www.cdc.gov/steadi/patient.html      Agency on Aging: www.goea.[place].[facility]      Alcoholics anonymous (AA): www.aa.org      Physical Activity: www.rubia.nih.gov/gt7tfap      Tobacco use: www.quitwithusla.org

## 2025-07-21 NOTE — PROGRESS NOTES
"Paula Hanna presented for a  Medicare AWV and comprehensive Health Risk Assessment today. The following components were reviewed and updated:    Medical history  Family History  Social history  Allergies and Current Medications  Health Risk Assessment  Health Maintenance  Care Team         ** See Completed Assessments for Annual Wellness Visit within the encounter summary.**         The following assessments were completed:  Living Situation  CAGE  Depression Screening  Timed Get Up and Go  Whisper Test  Cognitive Function Screening  Nutrition Screening  ADL Screening  PAQ Screening    Clock in media   Opioid documentation:      Patient does not have a current opioid prescription.        Vitals:    07/21/25 1322   BP: 122/70   Pulse: 69   Temp: 98.3 °F (36.8 °C)   TempSrc: Oral   SpO2: 99%   Weight: 84 kg (185 lb 3 oz)   Height: 5' 3" (1.6 m)     Body mass index is 32.8 kg/m².  Physical Exam  Constitutional:       Appearance: She is well-developed.   HENT:      Head: Normocephalic and atraumatic.      Nose: Nose normal.   Eyes:      General: Lids are normal.      Conjunctiva/sclera: Conjunctivae normal.   Cardiovascular:      Rate and Rhythm: Normal rate.   Pulmonary:      Effort: Pulmonary effort is normal.   Neurological:      Mental Status: She is alert and oriented to person, place, and time.   Psychiatric:         Speech: Speech normal.         Behavior: Behavior normal.               Diagnoses and health risks identified today and associated recommendations/orders:    1. Encounter for Medicare annual wellness exam  Discussed health maintenance guidelines appropriate for age.      - Referral to Enhanced Annual Wellness Visit (eAWV) M+1    2. Abnormality of gait and mobility  Stable, continue to monitor       3. Prediabetes  A1c increased to 6.2 on most recent labs  Discussed need for improvement in diet and exercise to prevent further progression to DM2      4. Hypertension, essential  Controlled, continue " current medication regimen  Low salt diet  Increase physical activity  Followed by pcp        Provided Paula with a 5-10 year written screening schedule and personal prevention plan. Recommendations were developed using the USPSTF age appropriate recommendations. Education, counseling, and referrals were provided as needed. After Visit Summary printed and given to patient which includes a list of additional screenings\tests needed.    Follow up for One year for Annual Wellness Visit.    Samanta Farah, NP      I offered to discuss advanced care planning, including how to pick a person who would make decisions for you if you were unable to make them for yourself, called a health care power of , and what kind of decisions you might make such as use of life sustaining treatments such as ventilators and tube feeding when faced with a life limiting illness recorded on a living will that they will need to know. (How you want to be cared for as you near the end of your natural life)     X Patient is interested in learning more about how to make advanced directives.  I provided them paperwork and offered to discuss this with them.

## 2025-07-23 ENCOUNTER — OFFICE VISIT (OUTPATIENT)
Dept: URGENT CARE | Facility: CLINIC | Age: 65
End: 2025-07-23
Payer: MEDICARE

## 2025-07-23 VITALS
OXYGEN SATURATION: 98 % | TEMPERATURE: 100 F | BODY MASS INDEX: 32.78 KG/M2 | HEART RATE: 86 BPM | RESPIRATION RATE: 20 BRPM | WEIGHT: 185 LBS | SYSTOLIC BLOOD PRESSURE: 145 MMHG | HEIGHT: 63 IN | DIASTOLIC BLOOD PRESSURE: 83 MMHG

## 2025-07-23 DIAGNOSIS — R05.9 COUGH, UNSPECIFIED TYPE: ICD-10-CM

## 2025-07-23 DIAGNOSIS — R09.81 SINUS CONGESTION: ICD-10-CM

## 2025-07-23 DIAGNOSIS — J06.9 VIRAL URI WITH COUGH: ICD-10-CM

## 2025-07-23 DIAGNOSIS — J02.9 SORE THROAT: Primary | ICD-10-CM

## 2025-07-23 DIAGNOSIS — I10 HYPERTENSION, ESSENTIAL: ICD-10-CM

## 2025-07-23 LAB
CTP QC/QA: YES
FLUAV AG NPH QL: NEGATIVE
FLUBV AG NPH QL: NEGATIVE
S PYO RRNA THROAT QL PROBE: NEGATIVE
SARS-COV+SARS-COV-2 AG RESP QL IA.RAPID: NEGATIVE

## 2025-07-23 PROCEDURE — 87880 STREP A ASSAY W/OPTIC: CPT | Mod: QW,,, | Performed by: NURSE PRACTITIONER

## 2025-07-23 PROCEDURE — 87804 INFLUENZA ASSAY W/OPTIC: CPT | Mod: QW,,, | Performed by: NURSE PRACTITIONER

## 2025-07-23 PROCEDURE — 87811 SARS-COV-2 COVID19 W/OPTIC: CPT | Mod: QW,S$GLB,, | Performed by: NURSE PRACTITIONER

## 2025-07-23 PROCEDURE — 99204 OFFICE O/P NEW MOD 45 MIN: CPT | Mod: S$GLB,,, | Performed by: NURSE PRACTITIONER

## 2025-07-23 NOTE — PATIENT INSTRUCTIONS
Symptomatic treatment to include:     Rest, increase fluid intake to include electrolyte replacement  Ibuprofen/Tylenol as directed for fever, sore throat, body aches  Zrytec and flonase for sinus symptoms  Cough medication as prescribed  Warm, salt water gargles, over the counter throat lozenges or sprays as desires.   Return to clinic if symptoms worsen or do not improve  ER for difficulty breathing not relieved by rest, excessive lethargy and/or change in mental status

## 2025-07-23 NOTE — PROGRESS NOTES
"Subjective:      Patient ID: Paula Hanna is a 65 y.o. female.    Vitals:  height is 5' 3" (1.6 m) and weight is 83.9 kg (185 lb). Her oral temperature is 100 °F (37.8 °C). Her blood pressure is 145/83 (abnormal) and her pulse is 86. Her respiration is 20 and oxygen saturation is 98%.     Chief Complaint: Cough    Patient is a 65-year-old female who presents with cough, sore throat, and sinus congestion since yesterday.  She denies known sick contacts.  She has not had any fevers and has been taking Chlorcetin for symptoms.      Cough  The current episode started yesterday. The cough is Productive of sputum. Associated symptoms include headaches, postnasal drip and a sore throat. Pertinent negatives include no chest pain, chills, ear pain, fever, myalgias, rash or shortness of breath.       Constitution: Negative. Negative for chills, sweating, fatigue and fever.   HENT:  Positive for postnasal drip and sore throat. Negative for ear pain and congestion.    Neck: neck negative.   Cardiovascular: Negative.  Negative for chest pain and palpitations.   Eyes: Negative.    Respiratory:  Positive for cough. Negative for chest tightness and shortness of breath.    Gastrointestinal: Negative.  Negative for abdominal pain, nausea, vomiting and diarrhea.   Endocrine: negative.   Genitourinary: Negative.    Musculoskeletal: Negative.  Negative for muscle ache.   Skin: Negative.  Negative for color change, pale and rash.   Allergic/Immunologic: Negative.    Neurological:  Positive for headaches. Negative for dizziness, light-headedness, passing out, disorientation and altered mental status.   Hematologic/Lymphatic: Negative.    Psychiatric/Behavioral: Negative.  Negative for altered mental status, disorientation and confusion.       Objective:     Physical Exam   Constitutional: She is oriented to person, place, and time. She appears well-developed. She is cooperative.  Non-toxic appearance. She does not appear ill. No " distress.   HENT:   Head: Normocephalic and atraumatic.   Ears:   Right Ear: Hearing, tympanic membrane, external ear and ear canal normal.   Left Ear: Hearing, tympanic membrane, external ear and ear canal normal.   Nose: Congestion present. No mucosal edema, rhinorrhea or nasal deformity. No epistaxis. Right sinus exhibits no maxillary sinus tenderness and no frontal sinus tenderness. Left sinus exhibits no maxillary sinus tenderness and no frontal sinus tenderness.   Mouth/Throat: Uvula is midline, oropharynx is clear and moist and mucous membranes are normal. No trismus in the jaw. Normal dentition. No uvula swelling. No oropharyngeal exudate, posterior oropharyngeal edema or posterior oropharyngeal erythema.   Eyes: Conjunctivae and lids are normal. No scleral icterus.   Neck: Trachea normal and phonation normal. Neck supple. No edema present. No erythema present. No neck rigidity present.   Cardiovascular: Normal rate, regular rhythm, normal heart sounds and normal pulses.   Pulmonary/Chest: Effort normal and breath sounds normal. No respiratory distress. She has no decreased breath sounds. She has no rhonchi.   Abdominal: Normal appearance. She exhibits no distension. Soft. flat abdomen   Musculoskeletal: Normal range of motion.         General: No deformity. Normal range of motion.   Neurological: no focal deficit. She is alert, oriented to person, place, and time and at baseline. She exhibits normal muscle tone. Coordination normal.   Skin: Skin is warm, dry, intact, not diaphoretic and not pale. Capillary refill takes less than 2 seconds.   Psychiatric: Her speech is normal and behavior is normal. Mood, judgment and thought content normal.   Nursing note and vitals reviewed.      Assessment:     1. Sore throat    2. Cough, unspecified type    3. Viral URI with cough    4. Sinus congestion    5. Hypertension, essential        Plan:       Sore throat  -     POCT rapid strep A    Cough, unspecified type  -      POCT Influenza A/B Rapid Antigen  -     SARS Coronavirus 2 Antigen, POCT Manual Read    Viral URI with cough    Sinus congestion    Hypertension, essential    Other orders  -     pyrilamine-chlophedianoL 12.5-12.5 mg/5 mL Liqd; Take 10 mLs by mouth 3 (three) times daily as needed (cough).  Dispense: 118 mL; Refill: 0    Strep: Negative  Flu: Negative  Covid: negative    History reviewed.  Discussed symptomatic treatment with patient with return if symptoms worsen or do not improve.  Medications as prescribed.  Return precautions and red flags for ER discussed with patient.  Follow up with primary care.  Patient in agreement with plan of care.

## 2025-07-29 ENCOUNTER — OFFICE VISIT (OUTPATIENT)
Dept: FAMILY MEDICINE | Facility: CLINIC | Age: 65
End: 2025-07-29
Payer: MEDICARE

## 2025-07-29 ENCOUNTER — HOSPITAL ENCOUNTER (OUTPATIENT)
Dept: RADIOLOGY | Facility: HOSPITAL | Age: 65
Discharge: HOME | End: 2025-07-29
Payer: MEDICARE

## 2025-07-29 ENCOUNTER — HOSPITAL ENCOUNTER (OUTPATIENT)
Dept: RADIOLOGY | Facility: HOSPITAL | Age: 65
Discharge: HOME | End: 2025-07-29
Attending: FAMILY MEDICINE
Payer: MEDICARE

## 2025-07-29 VITALS
SYSTOLIC BLOOD PRESSURE: 142 MMHG | TEMPERATURE: 98 F | BODY MASS INDEX: 32.52 KG/M2 | RESPIRATION RATE: 18 BRPM | DIASTOLIC BLOOD PRESSURE: 98 MMHG | WEIGHT: 183.56 LBS | HEIGHT: 63 IN | OXYGEN SATURATION: 96 % | HEART RATE: 81 BPM

## 2025-07-29 DIAGNOSIS — M79.672 LEFT FOOT PAIN: ICD-10-CM

## 2025-07-29 DIAGNOSIS — R06.2 WHEEZING: ICD-10-CM

## 2025-07-29 DIAGNOSIS — R05.1 ACUTE COUGH: ICD-10-CM

## 2025-07-29 DIAGNOSIS — I10 HYPERTENSION, ESSENTIAL: ICD-10-CM

## 2025-07-29 DIAGNOSIS — R19.7 DIARRHEA, UNSPECIFIED TYPE: ICD-10-CM

## 2025-07-29 DIAGNOSIS — B96.89 BACTERIAL UPPER RESPIRATORY INFECTION: Primary | ICD-10-CM

## 2025-07-29 DIAGNOSIS — F41.9 ANXIETY: ICD-10-CM

## 2025-07-29 DIAGNOSIS — J06.9 BACTERIAL UPPER RESPIRATORY INFECTION: Primary | ICD-10-CM

## 2025-07-29 PROCEDURE — 71046 X-RAY EXAM CHEST 2 VIEWS: CPT | Mod: TC

## 2025-07-29 PROCEDURE — 1160F RVW MEDS BY RX/DR IN RCRD: CPT | Mod: CPTII,S$GLB,,

## 2025-07-29 PROCEDURE — 73630 X-RAY EXAM OF FOOT: CPT | Mod: TC,LT

## 2025-07-29 PROCEDURE — 71046 X-RAY EXAM CHEST 2 VIEWS: CPT | Mod: 26,,, | Performed by: RADIOLOGY

## 2025-07-29 PROCEDURE — 1159F MED LIST DOCD IN RCRD: CPT | Mod: CPTII,S$GLB,,

## 2025-07-29 PROCEDURE — 3077F SYST BP >= 140 MM HG: CPT | Mod: CPTII,S$GLB,,

## 2025-07-29 PROCEDURE — 99999 PR PBB SHADOW E&M-EST. PATIENT-LVL V: CPT | Mod: PBBFAC,,,

## 2025-07-29 PROCEDURE — 73630 X-RAY EXAM OF FOOT: CPT | Mod: 26,LT,, | Performed by: RADIOLOGY

## 2025-07-29 PROCEDURE — 3044F HG A1C LEVEL LT 7.0%: CPT | Mod: CPTII,S$GLB,,

## 2025-07-29 PROCEDURE — 3008F BODY MASS INDEX DOCD: CPT | Mod: CPTII,S$GLB,,

## 2025-07-29 PROCEDURE — 99214 OFFICE O/P EST MOD 30 MIN: CPT | Mod: S$GLB,,,

## 2025-07-29 PROCEDURE — 3080F DIAST BP >= 90 MM HG: CPT | Mod: CPTII,S$GLB,,

## 2025-07-29 PROCEDURE — 1101F PT FALLS ASSESS-DOCD LE1/YR: CPT | Mod: CPTII,S$GLB,,

## 2025-07-29 PROCEDURE — 3288F FALL RISK ASSESSMENT DOCD: CPT | Mod: CPTII,S$GLB,,

## 2025-07-29 PROCEDURE — 1126F AMNT PAIN NOTED NONE PRSNT: CPT | Mod: CPTII,S$GLB,,

## 2025-07-29 RX ORDER — DOXYCYCLINE 100 MG/1
100 CAPSULE ORAL EVERY 12 HOURS
Qty: 20 CAPSULE | Refills: 0 | Status: SHIPPED | OUTPATIENT
Start: 2025-07-29 | End: 2025-08-08

## 2025-07-29 RX ORDER — BENZONATATE 200 MG/1
200 CAPSULE ORAL 3 TIMES DAILY PRN
Qty: 30 CAPSULE | Refills: 1 | Status: SHIPPED | OUTPATIENT
Start: 2025-07-29

## 2025-07-29 RX ORDER — ALBUTEROL SULFATE 90 UG/1
2 INHALANT RESPIRATORY (INHALATION) EVERY 6 HOURS PRN
Qty: 18 G | Refills: 0 | Status: SHIPPED | OUTPATIENT
Start: 2025-07-29

## 2025-07-29 NOTE — TELEPHONE ENCOUNTER
No care due was identified.  [facility] Embedded Care Due Messages. Reference number: [id number].   7/29/2025 3:24:52 PM CDT

## 2025-07-29 NOTE — PROGRESS NOTES
Subjective:       Patient ID: Paula Hanna is a 65 y.o. female.    Chief Complaint: Cough, Wheezing (At night ), and Diarrhea    History of Present Illness    CHIEF COMPLAINT:  Patient presents today for upper respiratory problems with coughing and wheezing.    HISTORY OF PRESENT ILLNESS:She had an urgent care visit on the 23rd for upper respiratory symptoms. Testing was negative for influenza, COVID-19, and strep throat. She was diagnosed with viral respiratory infection and prescribed cough syrup, which was ineffective. No antibiotics were prescribed at that time. She currently experiences frequent nasal congestion and nose blowing causing soreness. She has wheezing in her chest at night and chest pain associated with frequent coughing. She developed new onset diarrhea this morning, occurring twice. She reports loss of appetite this morning, which she attributes to coughing and feeling agitated.  Her blood pressure is elevated today which is possibly due to Mucinex all in one.     CURRENT MEDICATIONS:She is taking Mucinex all-in-one liquid, which has helped loosen respiratory secretions and improve coughing. She initially used Coricidin HBP but found it less effective. She is also taking Zyrtec as an antihistamine.              Review of patient's allergies indicates:   Allergen Reactions    Statins-hmg-coa reductase inhibitors Other (See Comments)     Muscle and joint pain      Lisinopril Other (See Comments)     COUGH, PATIENT ADVICE [facility] [place]    Milk containing products (dairy)     Naproxen Itching    Penicillins Itching     Pt states can take Keflex    Sulfa (sulfonamide antibiotics) Itching     Social Drivers of Health     Tobacco Use: Medium Risk (7/29/2025)    Patient History     Smoking Tobacco Use: Former     Smokeless Tobacco Use: Never     Passive Exposure: Not on file   Alcohol Use: Not At Risk (7/21/2025)    AUDIT-C     Frequency of Alcohol Consumption: Monthly or less     Average  Number of Drinks: Patient does not drink     Frequency of Binge Drinking: Never   Financial Resource Strain: Low Risk  (7/21/2025)    Overall Financial Resource Strain (CARDIA)     Difficulty of Paying Living Expenses: Not hard at all   Food Insecurity: No Food Insecurity (7/21/2025)    Hunger Vital Sign     Worried About Running Out of Food in the Last Year: Never true     Ran Out of Food in the Last Year: Never true   Transportation Needs: No Transportation Needs (7/21/2025)    PRAPARE - Transportation     Lack of Transportation (Medical): No     Lack of Transportation (Non-Medical): No   Physical Activity: Inactive (7/21/2025)    Exercise Vital Sign     Days of Exercise per Week: 0 days     Minutes of Exercise per Session: 0 min   Stress: Stress Concern Present (7/21/2025)    [employer] [place] [employer] - Occupational Stress Questionnaire     Feeling of Stress : Rather much   Housing Stability: Low Risk  (7/21/2025)    Housing Stability Vital Sign     Unable to Pay for Housing in the Last Year: No     Number of Times Moved in the Last Year: 1     Homeless in the Last Year: No   Depression: Low Risk  (7/21/2025)    Depression     Last PHQ-4: Flowsheet Data: 1   Utilities: Not At Risk (7/21/2025)    [facility] Utilities     Threatened with loss of utilities: No   Health Literacy: Adequate Health Literacy (7/21/2025)     Health Literacy     Frequency of need for help with medical instructions: Never   Social Isolation: Not on file      Past Medical History:   Diagnosis Date    Anxiety     Back pain     Degenerative disc disease     GERD (gastroesophageal reflux disease)     Heel spur     Hypertension     Hypertrophy of both inferior nasal turbinates     Nasal septal deviation     Psoriasis     Sciatica       Past Surgical History:   Procedure Laterality Date    COLON SURGERY      Cecal tumor/right side    HYSTERECTOMY  1999    [facility] with ? BSO    Ileocolectomy  2000         KNEE ARTHROSCOPY W/ DEBRIDEMENT      x 2, right    LAPAROSCOPIC CHOLECYSTECTOMY N/A 05/16/2021    Procedure: CHOLECYSTECTOMY, LAPAROSCOPIC;  Surgeon: Pepe Cool MD;  Location: [facility];  Service: General;  Laterality: N/A;    LUMBAR EPIDURAL INJECTION      TONSILLECTOMY      WRIST SURGERY Right     plate      Social History[1]    Current Medications[2]    Lab Results   Component Value Date    WBC 5.28 01/16/2025    HGB 13.2 01/16/2025    HCT 40.4 01/16/2025     01/16/2025    CHOL 176 07/15/2025    TRIG 66 07/15/2025    HDL 65 07/15/2025    ALT 12 07/15/2025    AST 13 07/15/2025     07/15/2025    K 4.2 07/15/2025     07/15/2025    CREATININE 0.9 07/15/2025    BUN 13 07/15/2025    CO2 27 07/15/2025    TSH 0.716 01/16/2025    INR 1.1 05/16/2021    HGBA1C 6.2 07/15/2025    MICROALBUR 1.6 01/17/2020       Review of Systems   Constitutional:  Negative for chills and fever.   HENT:  Positive for nasal congestion and sinus pressure/congestion. Negative for sore throat.    Respiratory:  Positive for shortness of breath. Negative for chest tightness.    Cardiovascular: Negative.    Gastrointestinal:  Positive for diarrhea. Negative for abdominal pain, nausea and vomiting.       Objective:      Physical Exam  Vitals reviewed.   Constitutional:       Appearance: Normal appearance.   HENT:      Right Ear: Tympanic membrane normal.      Left Ear: Tympanic membrane normal.      Nose: Congestion present.      Mouth/Throat:      Pharynx: Oropharynx is clear. Posterior oropharyngeal erythema present.   Eyes:      Conjunctiva/sclera: Conjunctivae normal.   Cardiovascular:      Rate and Rhythm: Normal rate and regular rhythm.      Pulses: Normal pulses.      Heart sounds: Normal heart sounds.   Pulmonary:      Effort: Pulmonary effort is normal.      Breath sounds: Examination of the right-lower field reveals rhonchi. Examination of the left-lower field reveals rhonchi. Rhonchi present.   Neurological:       Mental Status: She is alert.   Psychiatric:         Mood and Affect: Mood normal.         Thought Content: Thought content normal.         Judgment: Judgment normal.       Assessment:       1. Bacterial upper respiratory infection    2. Acute cough    3. Wheezing    4. Diarrhea, unspecified type    5. Hypertension, essential        Plan:       Paula was seen today for cough, wheezing and diarrhea.    Diagnoses and all orders for this visit:    Bacterial upper respiratory infection  -     doxycycline (VIBRAMYCIN) 100 MG Cap; Take 1 capsule (100 mg total) by mouth every 12 (twelve) hours. for 10 days    Acute cough  -     benzonatate (TESSALON) 200 MG capsule; Take 1 capsule (200 mg total) by mouth 3 (three) times daily as needed for Cough.  -     X-Ray Chest PA And Lateral; Future    Wheezing  -     albuterol (VENTOLIN HFA) 90 mcg/actuation inhaler; Inhale 2 puffs into the lungs every 6 (six) hours as needed for Wheezing or Shortness of Breath. Rescue    Diarrhea, unspecified type    Hypertension, essential    Chest xray today.  Take doxycycline as prescribed.  Tessalon or corcidin HBP for cough.  Stop mucinex all in one as it could be elevating her blood pressure.  Can take plain mucinex.  Imondium AD for diarrhea.  Light diet, advance as tolerated.  Follow up in 10 days.  Will recheck blood pressure at follow up visit.           This note was generated with the assistance of ambient listening technology. Verbal consent was obtained by the patient and accompanying visitor(s) for the recording of patient appointment to facilitate this note. I attest to having reviewed and edited the generated note for accuracy, though some syntax or spelling errors may persist. Please contact the author of this note for any clarification.           [1]  Social History  Socioeconomic History    Marital status:    [2]    Current Outpatient Medications:     ALPRAZolam (XANAX) 0.5 MG tablet, TAKE 1 TABLET BY MOUTH EVERY NIGHT  AT BEDTIME AS NEEDED, Disp: 30 tablet, Rfl: 0    amLODIPine (NORVASC) 2.5 MG tablet, TAKE 1 TABLET BY MOUTH DAILY, Disp: 90 tablet, Rfl: 2    aspirin (ECOTRIN) 81 MG EC tablet, Take 81 mg by mouth once daily., Disp: , Rfl:     azelastine (ASTELIN) 137 mcg (0.1 %) nasal spray, 1 spray (137 mcg total) by Nasal route 2 (two) times daily., Disp: 90 mL, Rfl: 1    carvediloL (COREG) 6.25 MG tablet, TAKE 1 TABLET(6.25 MG) BY MOUTH TWICE DAILY WITH MEALS, Disp: 180 tablet, Rfl: 3    clotrimazole-betamethasone 1-0.05% (LOTRISONE) cream, Apply topically 2 (two) times daily. PRN itch, Disp: 45 g, Rfl: 1    EScitalopram oxalate (LEXAPRO) 10 MG tablet, Take 1 tablet (10 mg total) by mouth once daily., Disp: 90 tablet, Rfl: 3    estradioL (VIVELLE-DOT) 0.1 mg/24 hr PTSW, Place 1 patch onto the skin twice a week., Disp: 24 patch, Rfl: 1    famotidine (PEPCID) 20 MG tablet, Take 20 mg by mouth every evening., Disp: , Rfl:     fluticasone propionate (FLONASE) 50 mcg/actuation nasal spray, 1 spray (50 mcg total) by Each Nostril route once daily., Disp: 16 g, Rfl: 5    ibuprofen (ADVIL,MOTRIN) 800 MG tablet, TAKE 1 TABLET(800 MG) BY MOUTH THREE TIMES DAILY EVERY 8 HOURS AS NEEDED FOR PAIN, Disp: 30 tablet, Rfl: 0    levocetirizine (XYZAL) 5 MG tablet, Take 1 tablet (5 mg total) by mouth every evening., Disp: 90 tablet, Rfl: 1    magnesium oxide (MAG-OX) 400 mg (241.3 mg magnesium) tablet, Take 1 tablet (400 mg total) by mouth once daily., Disp: 90 tablet, Rfl: 1    meloxicam (MOBIC) 15 MG tablet, Take 1 tablet (15 mg total) by mouth once daily., Disp: 30 tablet, Rfl: 2    pantoprazole (PROTONIX) 40 MG tablet, Take 1 tablet (40 mg total) by mouth once daily., Disp: 90 tablet, Rfl: 1    polyethylene glycol (GLYCOLAX) 17 gram/dose powder, Take 17 g by mouth once daily. , Disp: , Rfl: 0    spironolactone (ALDACTONE) 25 MG tablet, Take 1 tablet (25 mg total) by mouth once daily., Disp: 90 tablet, Rfl: 1    SUFLAVE  178.7-7.3-0.5 gram SolR, 1 Bottle., Disp: , Rfl:     albuterol (VENTOLIN HFA) 90 mcg/actuation inhaler, Inhale 2 puffs into the lungs every 6 (six) hours as needed for Wheezing or Shortness of Breath. Rescue, Disp: 18 g, Rfl: 0    benzonatate (TESSALON) 200 MG capsule, Take 1 capsule (200 mg total) by mouth 3 (three) times daily as needed for Cough., Disp: 30 capsule, Rfl: 1    doxycycline (VIBRAMYCIN) 100 MG Cap, Take 1 capsule (100 mg total) by mouth every 12 (twelve) hours. for 10 days, Disp: 20 capsule, Rfl: 0    Current Facility-Administered Medications:     influenza (adjuvanted) (Fluad) 45 mcg/0.5 mL IM vaccine (> or = 66 yo) 0.5 mL, 0.5 mL, Intramuscular, 1 time in Clinic/HOD, Tanvir Cisneros III, MD

## 2025-07-30 RX ORDER — ALPRAZOLAM 0.5 MG/1
0.5 TABLET ORAL NIGHTLY PRN
Qty: 30 TABLET | Refills: 0 | OUTPATIENT
Start: 2025-07-30

## 2025-07-30 RX ORDER — SPIRONOLACTONE 25 MG/1
TABLET ORAL
Qty: 90 TABLET | Refills: 3 | OUTPATIENT
Start: 2025-07-30

## 2025-07-30 NOTE — TELEPHONE ENCOUNTER
Refill Routing Note   Medication(s) are not appropriate for processing by [facility] for the following reason(s):        Outside of protocol  Required vitals abnormal    [facility] action(s):  Defer  Route               Appointments  past 12m or future 3m with PCP    Date Provider   Last Visit   7/17/2025 Tanvir Cisneros III, MD   Next Visit   10/23/2025 Tanvir Cisneros III, MD   ED visits in past 90 days: 0        Note composed:10:24 PM 07/29/2025

## 2025-08-18 ENCOUNTER — ANESTHESIA EVENT (OUTPATIENT)
Dept: ENDOSCOPY | Facility: HOSPITAL | Age: 65
End: 2025-08-18
Payer: MEDICARE

## 2025-08-18 ENCOUNTER — ANESTHESIA (OUTPATIENT)
Dept: ENDOSCOPY | Facility: HOSPITAL | Age: 65
End: 2025-08-18
Payer: MEDICARE

## 2025-08-18 ENCOUNTER — HOSPITAL ENCOUNTER (OUTPATIENT)
Facility: HOSPITAL | Age: 65
Discharge: HOME | End: 2025-08-18
Attending: INTERNAL MEDICINE | Admitting: FAMILY MEDICINE
Payer: MEDICARE

## 2025-08-18 DIAGNOSIS — Z86.0100 HISTORY OF COLON POLYPS: ICD-10-CM

## 2025-08-18 PROCEDURE — 37000009 HC ANESTHESIA EA ADD 15 MINS: Performed by: INTERNAL MEDICINE

## 2025-08-18 PROCEDURE — 63600175 PHARM REV CODE 636 W HCPCS: Performed by: NURSE ANESTHETIST, CERTIFIED REGISTERED

## 2025-08-18 PROCEDURE — 37000008 HC ANESTHESIA 1ST 15 MINUTES: Performed by: INTERNAL MEDICINE

## 2025-08-18 PROCEDURE — G0105 COLORECTAL SCRN; HI RISK IND: HCPCS | Mod: ,,, | Performed by: INTERNAL MEDICINE

## 2025-08-18 PROCEDURE — 25000003 PHARM REV CODE 250: Performed by: INTERNAL MEDICINE

## 2025-08-18 PROCEDURE — G0105 COLORECTAL SCRN; HI RISK IND: HCPCS | Performed by: INTERNAL MEDICINE

## 2025-08-18 RX ORDER — LIDOCAINE HYDROCHLORIDE 20 MG/ML
INJECTION INTRAVENOUS
Status: DISCONTINUED | OUTPATIENT
Start: 2025-08-18 | End: 2025-08-18

## 2025-08-18 RX ORDER — PROPOFOL 10 MG/ML
VIAL (ML) INTRAVENOUS
Status: DISCONTINUED | OUTPATIENT
Start: 2025-08-18 | End: 2025-08-18

## 2025-08-18 RX ORDER — SODIUM CHLORIDE 9 MG/ML
INJECTION, SOLUTION INTRAVENOUS CONTINUOUS
Status: DISCONTINUED | OUTPATIENT
Start: 2025-08-18 | End: 2025-08-18 | Stop reason: HOSPADM

## 2025-08-18 RX ORDER — GLYCOPYRROLATE 0.2 MG/ML
INJECTION INTRAMUSCULAR; INTRAVENOUS
Status: DISCONTINUED | OUTPATIENT
Start: 2025-08-18 | End: 2025-08-18

## 2025-08-18 RX ADMIN — LIDOCAINE HYDROCHLORIDE 75 MG: 20 INJECTION, SOLUTION INTRAVENOUS at 08:08

## 2025-08-18 RX ADMIN — SODIUM CHLORIDE: 9 INJECTION, SOLUTION INTRAVENOUS at 07:08

## 2025-08-18 RX ADMIN — PROPOFOL 40 MG: 10 INJECTION, EMULSION INTRAVENOUS at 08:08

## 2025-08-18 RX ADMIN — PROPOFOL 80 MG: 10 INJECTION, EMULSION INTRAVENOUS at 08:08

## 2025-08-18 RX ADMIN — GLYCOPYRROLATE 0.2 MG: 0.2 INJECTION INTRAMUSCULAR; INTRAVENOUS at 08:08

## 2025-08-19 VITALS
RESPIRATION RATE: 15 BRPM | TEMPERATURE: 98 F | HEART RATE: 76 BPM | OXYGEN SATURATION: 97 % | DIASTOLIC BLOOD PRESSURE: 58 MMHG | SYSTOLIC BLOOD PRESSURE: 117 MMHG

## 2025-08-27 ENCOUNTER — OFFICE VISIT (OUTPATIENT)
Dept: PODIATRY | Facility: CLINIC | Age: 65
End: 2025-08-27
Payer: MEDICARE

## 2025-08-27 VITALS — BODY MASS INDEX: 32.73 KG/M2 | OXYGEN SATURATION: 99 % | WEIGHT: 184.75 LBS | HEIGHT: 63 IN

## 2025-08-27 DIAGNOSIS — M76.829 POSTERIOR TIBIAL TENDON DYSFUNCTION: Primary | ICD-10-CM

## 2025-08-27 DIAGNOSIS — M79.672 CHRONIC FOOT PAIN, LEFT: ICD-10-CM

## 2025-08-27 DIAGNOSIS — G89.29 CHRONIC FOOT PAIN, LEFT: ICD-10-CM

## 2025-08-27 DIAGNOSIS — M21.42 PES PLANUS OF LEFT FOOT: ICD-10-CM

## 2025-08-27 PROCEDURE — 1101F PT FALLS ASSESS-DOCD LE1/YR: CPT | Mod: CPTII,S$GLB,, | Performed by: PODIATRIST

## 2025-08-27 PROCEDURE — 3008F BODY MASS INDEX DOCD: CPT | Mod: CPTII,S$GLB,, | Performed by: PODIATRIST

## 2025-08-27 PROCEDURE — 99204 OFFICE O/P NEW MOD 45 MIN: CPT | Mod: S$GLB,,, | Performed by: PODIATRIST

## 2025-08-27 PROCEDURE — 1125F AMNT PAIN NOTED PAIN PRSNT: CPT | Mod: CPTII,S$GLB,, | Performed by: PODIATRIST

## 2025-08-27 PROCEDURE — 1159F MED LIST DOCD IN RCRD: CPT | Mod: CPTII,S$GLB,, | Performed by: PODIATRIST

## 2025-08-27 PROCEDURE — 3044F HG A1C LEVEL LT 7.0%: CPT | Mod: CPTII,S$GLB,, | Performed by: PODIATRIST

## 2025-08-27 PROCEDURE — 1160F RVW MEDS BY RX/DR IN RCRD: CPT | Mod: CPTII,S$GLB,, | Performed by: PODIATRIST

## 2025-08-27 PROCEDURE — 99999 PR PBB SHADOW E&M-EST. PATIENT-LVL IV: CPT | Mod: PBBFAC,,, | Performed by: PODIATRIST

## 2025-08-27 PROCEDURE — 3288F FALL RISK ASSESSMENT DOCD: CPT | Mod: CPTII,S$GLB,, | Performed by: PODIATRIST

## 2025-08-27 RX ORDER — METHYLPREDNISOLONE 4 MG/1
TABLET ORAL
Qty: 21 EACH | Refills: 1 | Status: SHIPPED | OUTPATIENT
Start: 2025-08-27 | End: 2025-09-17

## 2025-08-27 RX ORDER — MELOXICAM 15 MG/1
15 TABLET ORAL DAILY
Qty: 30 TABLET | Refills: 3 | Status: SHIPPED | OUTPATIENT
Start: 2025-08-27

## (undated) DEVICE — SCISSORS 5MM APPLIED MEDICAL   CB030

## (undated) DEVICE — TRAY GENERAL LAPAROSCOPY

## (undated) DEVICE — DERMABOND HVD MINI  DHVM12

## (undated) DEVICE — SOLUTION IRRI H2O BOTTLE 1000ML

## (undated) DEVICE — SLEEVE TROCAR ENDOPATH XCEL

## (undated) DEVICE — TROCAR ENDOPATH XCEL BLADELESS B5LT

## (undated) DEVICE — GLOVE BIOGEL PI ULTRA TOUCH GRAY SZ7.5

## (undated) DEVICE — TROCAR BALL. BLNT HASSON C0R47

## (undated) DEVICE — SUTURE VICRYL #0 27 UR-6 VCP603H

## (undated) DEVICE — SOLUTION IRRI NS BOTTLE 1000ML R5200-01

## (undated) DEVICE — RETRIEVER ENDOPOUCH SPEC BAG

## (undated) DEVICE — TIP BOVIE ENT 2.75      E1455

## (undated) DEVICE — CABLE MONOPOLAR 10FT DISPOSABLE

## (undated) DEVICE — SUCTION/IRRIGATOR W/TIP

## (undated) DEVICE — NEEDLE SAFETY ECLIPSE 21G 1IN 305764

## (undated) DEVICE — GOWN X-LARGE 044674

## (undated) DEVICE — PAD BOVIE ADULT

## (undated) DEVICE — APPLIER CLIP  5MM

## (undated) DEVICE — SUTURE MONOCRYL 4-0 PS-2 27 MCP426H